# Patient Record
Sex: FEMALE | Race: WHITE | NOT HISPANIC OR LATINO | Employment: OTHER | URBAN - NONMETROPOLITAN AREA
[De-identification: names, ages, dates, MRNs, and addresses within clinical notes are randomized per-mention and may not be internally consistent; named-entity substitution may affect disease eponyms.]

---

## 2018-01-04 LAB
CREAT ?TM UR-SCNC: 222 UMOL/L
EXT MICROALBUMIN URINE RANDOM: 19.5
HBA1C MFR BLD HPLC: 5 %
MICROALBUMIN/CREAT UR: 88 MG/G{CREAT}

## 2019-01-31 ENCOUNTER — TRANSITIONAL CARE MANAGEMENT (OUTPATIENT)
Dept: FAMILY MEDICINE CLINIC | Facility: CLINIC | Age: 59
End: 2019-01-31

## 2019-02-01 RX ORDER — SENNA AND DOCUSATE SODIUM 50; 8.6 MG/1; MG/1
1 TABLET, FILM COATED ORAL
COMMUNITY
Start: 2019-01-30 | End: 2019-04-05 | Stop reason: SDUPTHER

## 2019-02-01 RX ORDER — OXYCODONE HYDROCHLORIDE 5 MG/1
1 TABLET ORAL EVERY 6 HOURS PRN
Refills: 0 | COMMUNITY
Start: 2019-01-30 | End: 2019-02-06 | Stop reason: SDUPTHER

## 2019-02-01 RX ORDER — POLYETHYLENE GLYCOL 3350 17 G/17G
17 POWDER, FOR SOLUTION ORAL DAILY
Refills: 0 | COMMUNITY
Start: 2019-01-30 | End: 2021-04-27

## 2019-02-06 ENCOUNTER — OFFICE VISIT (OUTPATIENT)
Dept: FAMILY MEDICINE CLINIC | Facility: CLINIC | Age: 59
End: 2019-02-06
Payer: COMMERCIAL

## 2019-02-06 VITALS
OXYGEN SATURATION: 95 % | TEMPERATURE: 99.1 F | BODY MASS INDEX: 17.26 KG/M2 | WEIGHT: 107.4 LBS | HEART RATE: 94 BPM | DIASTOLIC BLOOD PRESSURE: 62 MMHG | SYSTOLIC BLOOD PRESSURE: 98 MMHG | HEIGHT: 66 IN

## 2019-02-06 DIAGNOSIS — C34.91 NON-SMALL CELL CANCER OF RIGHT LUNG (HCC): ICD-10-CM

## 2019-02-06 DIAGNOSIS — H60.501 ACUTE OTITIS EXTERNA OF RIGHT EAR, UNSPECIFIED TYPE: ICD-10-CM

## 2019-02-06 DIAGNOSIS — Z00.00 ENCOUNTER FOR MEDICAL EXAMINATION TO ESTABLISH CARE: Primary | ICD-10-CM

## 2019-02-06 DIAGNOSIS — R05.9 COUGH: ICD-10-CM

## 2019-02-06 DIAGNOSIS — Z09 HOSPITAL DISCHARGE FOLLOW-UP: ICD-10-CM

## 2019-02-06 DIAGNOSIS — E78.2 MIXED HYPERLIPIDEMIA: ICD-10-CM

## 2019-02-06 DIAGNOSIS — R06.02 SHORTNESS OF BREATH: ICD-10-CM

## 2019-02-06 DIAGNOSIS — C34.91 ADENOSQUAMOUS CARCINOMA OF RIGHT LUNG (HCC): ICD-10-CM

## 2019-02-06 PROBLEM — R91.8 ENDOBRONCHIAL MASS: Status: ACTIVE | Noted: 2019-01-15

## 2019-02-06 PROBLEM — E78.5 DYSLIPIDEMIA: Status: ACTIVE | Noted: 2019-01-15

## 2019-02-06 PROBLEM — E11.9 DM (DIABETES MELLITUS) (HCC): Status: ACTIVE | Noted: 2019-01-15

## 2019-02-06 PROCEDURE — 99205 OFFICE O/P NEW HI 60 MIN: CPT | Performed by: NURSE PRACTITIONER

## 2019-02-06 RX ORDER — GUAIFENESIN AND CODEINE PHOSPHATE 100; 10 MG/5ML; MG/5ML
5 SOLUTION ORAL 3 TIMES DAILY PRN
Qty: 180 ML | Refills: 0 | Status: SHIPPED | OUTPATIENT
Start: 2019-02-06 | End: 2019-11-26 | Stop reason: SDUPTHER

## 2019-02-06 RX ORDER — ATORVASTATIN CALCIUM 40 MG/1
40 TABLET, FILM COATED ORAL DAILY
Qty: 30 TABLET | Refills: 5 | Status: SHIPPED | OUTPATIENT
Start: 2019-02-06 | End: 2019-03-06 | Stop reason: SDUPTHER

## 2019-02-06 RX ORDER — OXYCODONE HYDROCHLORIDE 5 MG/1
5 TABLET ORAL EVERY 6 HOURS PRN
Qty: 120 TABLET | Refills: 0 | Status: SHIPPED | OUTPATIENT
Start: 2019-02-06 | End: 2019-03-06 | Stop reason: SDUPTHER

## 2019-02-06 NOTE — PATIENT INSTRUCTIONS
Wellness Visit for Adults   WHAT YOU NEED TO KNOW:   What is a wellness visit? A wellness visit is when you see your healthcare provider to get screened for health problems  You can also get advice on how to stay healthy  Write down your questions so you remember to ask them  Ask your healthcare provider how often you should have a wellness visit  What happens at a wellness visit? Your healthcare provider will ask about your health, and your family history of health problems  This includes high blood pressure, heart disease, and cancer  He or she will ask if you have symptoms that concern you, if you smoke, and about your mood  You may also be asked about your intake of medicines, supplements, food, and alcohol  Any of the following may be done:  · Your weight  will be checked  Your height may also be checked so your body mass index (BMI) can be calculated  Your BMI shows if you are at a healthy weight  · Your blood pressure  and heart rate will be checked  Your temperature may also be checked  · Blood and urine tests  may be done  Blood tests may be done to check your cholesterol levels  Abnormal cholesterol levels increase your risk for heart disease and stroke  You may also need a blood or urine test to check for diabetes if you are at increased risk  Urine tests may be done to look for signs of an infection or kidney disease  · A physical exam  includes checking your heartbeat and lungs with a stethoscope  Your healthcare provider may also check your skin to look for sun damage  · Screening tests  may be recommended  A screening test is done to check for diseases that may not cause symptoms  The screening tests you may need depend on your age, gender, family history, and lifestyle habits  For example, colorectal screening may be recommended if you are 48years old or older  What screening tests do I need if I am a woman? · A Pap smear  is used to screen for cervical cancer   Pap smears are usually done every 3 to 5 years depending on your age  You may need them more often if you have had abnormal Pap smear test results in the past  Ask your healthcare provider how often you should have a Pap smear  · A mammogram  is an x-ray of your breasts to screen for breast cancer  Experts recommend mammograms every 2 years starting at age 48 years  You may need a mammogram at age 52 years or younger if you have an increased risk for breast cancer  Talk to your healthcare provider about when you should start having mammograms and how often you need them  What vaccines might I need? · Get an influenza vaccine  every year  The influenza vaccine protects you from the flu  Several types of viruses cause the flu  The viruses change over time, so new vaccines are made each year  · Get a tetanus-diphtheria (Td) booster vaccine  every 10 years  This vaccine protects you against tetanus and diphtheria  Tetanus is a severe infection that may cause painful muscle spasms and lockjaw  Diphtheria is a severe bacterial infection that causes a thick covering in the back of your mouth and throat  · Get a human papillomavirus (HPV) vaccine  if you are female and aged 23 to 32 or male 23 to 24 and never received it  This vaccine protects you from HPV infection  HPV is the most common infection spread by sexual contact  HPV may also cause vaginal, penile, and anal cancers  · Get a pneumococcal vaccine  if you are aged 72 years or older  The pneumococcal vaccine is an injection given to protect you from pneumococcal disease  Pneumococcal disease is an infection caused by pneumococcal bacteria  The infection may cause pneumonia, meningitis, or an ear infection  · Get a shingles vaccine  if you are aged 61 or older, even if you have had shingles before  The shingles vaccine is an injection to protect you from the varicella-zoster virus  This is the same virus that causes chickenpox   Shingles is a painful rash that develops in people who had chickenpox or have been exposed to the virus  How can I eat healthy? My Plate is a model for planning healthy meals  It shows the types and amounts of foods that should go on your plate  Fruits and vegetables make up about half of your plate, and grains and protein make up the other half  A serving of dairy is included on the side of your plate  The amount of calories and serving sizes you need depends on your age, gender, weight, and height  Examples of healthy foods are listed below:  · Eat a variety of vegetables  such as dark green, red, and orange vegetables  You can also include canned vegetables low in sodium (salt) and frozen vegetables without added butter or sauces  · Eat a variety of fresh fruits , canned fruit in 100% juice, frozen fruit, and dried fruit  · Include whole grains  At least half of the grains you eat should be whole grains  Examples include whole-wheat bread, wheat pasta, brown rice, and whole-grain cereals such as oatmeal     · Eat a variety of protein foods such as seafood (fish and shellfish), lean meat, and poultry without skin (turkey and chicken)  Examples of lean meats include pork leg, shoulder, or tenderloin, and beef round, sirloin, tenderloin, and extra lean ground beef  Other protein foods include eggs and egg substitutes, beans, peas, soy products, nuts, and seeds  · Choose low-fat dairy products such as skim or 1% milk or low-fat yogurt, cheese, and cottage cheese  · Limit unhealthy fats  such as butter, hard margarine, and shortening  How much exercise do I need? Exercise at least 30 minutes per day on most days of the week  Some examples of exercise include walking, biking, dancing, and swimming  You can also fit in more physical activity by taking the stairs instead of the elevator or parking farther away from stores  Include muscle strengthening activities 2 days each week  Regular exercise provides many health benefits  It helps you manage your weight, and decreases your risk for type 2 diabetes, heart disease, stroke, and high blood pressure  Exercise can also help improve your mood  Ask your healthcare provider about the best exercise plan for you  What are some general health and safety guidelines I should follow? · Do not smoke  Nicotine and other chemicals in cigarettes and cigars can cause lung damage  Ask your healthcare provider for information if you currently smoke and need help to quit  E-cigarettes or smokeless tobacco still contain nicotine  Talk to your healthcare provider before you use these products  · Limit alcohol  A drink of alcohol is 12 ounces of beer, 5 ounces of wine, or 1½ ounces of liquor  · Lose weight, if needed  Being overweight increases your risk of certain health conditions  These include heart disease, high blood pressure, type 2 diabetes, and certain types of cancer  · Protect your skin  Do not sunbathe or use tanning beds  Use sunscreen with a SPF 15 or higher  Apply sunscreen at least 15 minutes before you go outside  Reapply sunscreen every 2 hours  Wear protective clothing, hats, and sunglasses when you are outside  · Drive safely  Always wear your seatbelt  Make sure everyone in your car wears a seatbelt  A seatbelt can save your life if you are in an accident  Do not use your cell phone when you are driving  This could distract you and cause an accident  Pull over if you need to make a call or send a text message  · Practice safe sex  Use latex condoms if are sexually active and have more than one partner  Your healthcare provider may recommend screening tests for sexually transmitted infections (STIs)  · Wear helmets, lifejackets, and protective gear  Always wear a helmet when you ride a bike or motorcycle, go skiing, or play sports that could cause a head injury  Wear protective equipment when you play sports   Wear a lifejacket when you are on a boat or doing water sports  CARE AGREEMENT:   You have the right to help plan your care  Learn about your health condition and how it may be treated  Discuss treatment options with your caregivers to decide what care you want to receive  You always have the right to refuse treatment  The above information is an  only  It is not intended as medical advice for individual conditions or treatments  Talk to your doctor, nurse or pharmacist before following any medical regimen to see if it is safe and effective for you  © 2017 [address] Information is for End User's use only and may not be sold, redistributed or otherwise used for commercial purposes  All illustrations and images included in CareNotes® are the copyrighted property of [employer]  or Arturo Osorio  Lung Cancer   WHAT YOU NEED TO KNOW:   What is lung cancer? Lung cancer usually starts in the cells that line the inside of the lungs  The 2 basic types of lung cancer are non-small cell lung cancer and small cell lung cancer  What increases my risk for lung cancer? · Cigarette smoking, or breathing secondhand smoke    · Air pollution, such as diesel exhaust fumes    · Exposure to radon gas    · A family history of lung cancer    · Past lung diseases that caused scarring in the lungs, such as tuberculosis (TB)    · Working with chemicals, such as asbestos, uranium, arsenic, chromium, nickel, iron, or radioactive material  What are the signs and symptoms of lung cancer?    · Chest pain that is not just in one area of your chest    · A cough that will not go away, and gets worse over time    · Coughing up lots of sputum, which may be bloody    · Frequent colds or other respiratory infections, such as pneumonia or bronchitis    · Wheezing or trouble breathing    · Hoarseness or difficulty swallowing    · Feeling more tired and weak than usual    · Loss of appetite or weight loss without trying    · Face or neck swelling  How is lung cancer diagnosed? · Blood tests  may show an infection  · An x-ray, CT, or MRI  may show the size and location of the cancer, or signs of infection  You may be given contrast liquid to help your lungs show up better  Tell the healthcare provider if you have ever had an allergic reaction to contrast liquid  Do not enter the MRI room with anything metal  Metal can cause serious injury  Tell the healthcare provider if you have any metal in or on your body  · A bronchoscopy  is a test to look inside your airway and lungs  Healthcare providers insert a bronchoscope (tube with a light and magnifying glass on the end) into your mouth and down into your lungs  · A biopsy  is a sample of lung tissue usually collected during a bronchoscopy  The sample will be sent to a lab and checked for abnormal cells  How is lung cancer treated? · Surgery  may be done on tumors that are small and have not spread to other parts of the body  If the tumor cannot be completely removed, surgery may be used to treat complications or to decrease your symptoms  · Radiation therapy  uses x-rays or gamma rays to treat cancer  Radiation kills cancer cells and may stop the cancer from spreading  · Medicine  is used to kill cancer cells  It may also be used to shrink lymph nodes that have cancer in them  What can I do to manage my lung cancer? · Do not smoke  Nicotine and other chemicals in cigarettes and cigars can cause lung damage  Ask your healthcare provider for information if you currently smoke and need help to quit  E-cigarettes or smokeless tobacco still contain nicotine  Talk to your healthcare provider before you use these products  · Drink liquids as directed  Ask how much liquid to drink each day and which liquids are best for you  Drink extra liquids to prevent dehydration  You will need to drink extra liquids if you are vomiting or have diarrhea from cancer treatments      · Return to activities slowly  Do more as you feel stronger  You may have trouble breathing when you are lying down  Use foam wedges or elevate the head of your bed  This may help you breathe easier while you are resting or sleeping  Use a device that will tilt your whole body, or bend your body at the waist  The device should not bend your body at the upper back or neck  · Exercise as directed  Exercise can help increase your energy level  · Eat healthy foods  Healthy foods include fruits, vegetables, whole-grain breads, low-fat dairy products, beans, lean meats, and fish  It may be easier for you to eat several small meals a day rather than a few large meals  · Limit or do not drink alcohol as directed  Alcohol can make breathing problems worse  Ask your healthcare provider if alcohol is safe for you to drink  You will need to limit the amount you drink if it is safe for you  Men should limit alcohol to 2 drinks per day  Women should limit alcohol to 1 drink per day  A drink of alcohol is 12 ounces of beer, 5 ounces of wine, or 1½ ounces of liquor  Call 911 for any of the following:   · Your arm or leg feels warm, tender, and painful  It may look swollen and red  · You have chest pain when you take a deep breath or cough  · You suddenly feel lightheaded or are short of breath  · You cough up blood  When should I seek immediate care? · You cannot think clearly  · Your lips or nails look blue or pale  When should I contact my healthcare provider? · You have a fever  · You have blood in your mucus or spit  · You are vomiting and cannot keep food or liquids down  · You have questions or concerns about your condition or care  CARE AGREEMENT:   You have the right to help plan your care  Learn about your health condition and how it may be treated  Discuss treatment options with your caregivers to decide what care you want to receive  You always have the right to refuse treatment   The above information is an  only  It is not intended as medical advice for individual conditions or treatments  Talk to your doctor, nurse or pharmacist before following any medical regimen to see if it is safe and effective for you  © 2017 [address] Information is for End User's use only and may not be sold, redistributed or otherwise used for commercial purposes  All illustrations and images included in CareNotes® are the copyrighted property of [employer]  or Art[employer] Osorio

## 2019-02-06 NOTE — PROGRESS NOTES
Assessment/Plan:     Diagnoses and all orders for this visit:    Encounter for medical examination to establish care    Hospital discharge follow-up    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HCC)  -     guaifenesin-codeine (GUAIFENESIN AC) 100-10 MG/5ML liquid; Take 5 mL by mouth 3 (three) times a day as needed for cough  -     oxyCODONE (ROXICODONE) 5 mg immediate release tablet; Take 1 tablet (5 mg total) by mouth every 6 (six) hours as needed for moderate pain or severe pain Max Daily Amount: 20 mg  -     Shower chair  -     Walker  -     Misc  Devices (COMMODE BEDSIDE) MISC; by Does not apply route daily as needed (shortness of breath)  -     Ambulatory referral to Palliative Care; Future    Non-small cell cancer of right lung (HCC)  -     guaifenesin-codeine (GUAIFENESIN AC) 100-10 MG/5ML liquid; Take 5 mL by mouth 3 (three) times a day as needed for cough  -     oxyCODONE (ROXICODONE) 5 mg immediate release tablet; Take 1 tablet (5 mg total) by mouth every 6 (six) hours as needed for moderate pain or severe pain Max Daily Amount: 20 mg  -     Shower chair  -     Walker  -     Misc  Devices (COMMODE BEDSIDE) MISC; by Does not apply route daily as needed (shortness of breath)  -     Ambulatory referral to Palliative Care; Future    Shortness of breath  -     Shower chair  -     Walker  -     Misc  Devices (COMMODE BEDSIDE) MISC; by Does not apply route daily as needed (shortness of breath)  -     Ambulatory referral to Palliative Care; Future    Cough  -     guaifenesin-codeine (GUAIFENESIN AC) 100-10 MG/5ML liquid; Take 5 mL by mouth 3 (three) times a day as needed for cough  -     Ambulatory referral to Palliative Care; Future    Acute otitis externa of right ear, unspecified type  -     neomycin-polymyxin-hydrocortisone (CORTISPORIN) otic solution; Administer 4 drops to the right ear every 6 (six) hours for 7 days    Mixed hyperlipidemia  -     atorvastatin (LIPITOR) 40 mg tablet;  Take 1 tablet (40 mg total) by mouth daily    Other orders  -     senna-docusate sodium (SENOKOT-S) 8 6-50 mg per tablet; Take 1 tablet by mouth daily at bedtime   -     polyethylene glycol (MIRALAX) 17 g packet; Take 17 g by mouth daily   -     Discontinue: oxyCODONE (ROXICODONE) 5 mg immediate release tablet; Take 1 tablet by mouth every 6 (six) hours as needed         Subjective:      Patient ID: Joao Valdes is a 66 y o  female  Patient presents to [address] [place] [address] to establish care and follow up after hospitalization  Allergies, medical history and current medications reviewed with patient; patient reports taking all medications as prescribed without issues  Patient had been admitted to [facility] in [facility] with a discharge diagnosis of lung cancer  Patient follows with Oncology Dr Mtahew Bishop (next appointment 2/14/19) and Radiation Oncology Dr Michael Resendez (next appointment 3/19/19)  Patient is scheduled for a PET scan on 1/12/19  Patient is also receiving care through home health  Today, patient C/O a cough with associated chest pain  Patient reports she has a decreased appetite, but her  reports it has "been better than it was " Patient currently tries to eat 6 small meals daily due to Chronic Pancreatitis  Patient reports she also follows with [facility] for chronic pancreatitis; patient reports she was told that she had 5 years to live due to precancerous lesions on her pancreas  Patient reports she is currently at the 6 year dany  TCM Call (since 1/6/2019)     Date and time call was made  1/31/2019  1:29 PM    Hospital care reviewed  Records reviewed    Patient was hospitialized at  [facility]    Date of Admission  01/15/19    Date of discharge  01/30/19    Diagnosis  hemoptysis  right lung mass   pulmonary emphysema    Disposition  Home    Were the patients medications reviewed and updated  No    Current Symptoms  Chest pain    Chest pain severity  Moderate      TCM Call (since 1/6/2019)     Scheduled for follow up? Yes    I have advised the patient to call PCP with any new or worsening symptoms    [facility]       Patient Care Team:  Meaghan Cowan as PCP - General (Family Medicine)  Necia Phoenix, MD (Oncology)  Mj Suarez MD (Radiation Oncology)    Review of Systems   Constitutional: Positive for appetite change (decreased) and fatigue  Negative for activity change, chills, fever and unexpected weight change  HENT: Positive for trouble swallowing  Negative for congestion, ear pain, hearing loss, postnasal drip, rhinorrhea, sinus pressure, sore throat and voice change  Eyes: Negative for pain, itching and visual disturbance  Respiratory: Positive for cough and shortness of breath  Negative for chest tightness  Cardiovascular: Positive for chest pain (non-cardiac)  Negative for palpitations and leg swelling  Gastrointestinal: Negative for abdominal pain, blood in stool, constipation, diarrhea, nausea and vomiting  Endocrine: Negative for cold intolerance and heat intolerance  Genitourinary: Negative for difficulty urinating, dysuria, frequency, hematuria and urgency  Musculoskeletal: Negative for arthralgias, back pain, joint swelling and myalgias  Skin: Negative for color change, rash and wound  Allergic/Immunologic: Negative  Neurological: Positive for dizziness, weakness ("unstable on feet") and headaches  Negative for light-headedness and numbness  Hematological: Negative  Psychiatric/Behavioral: Negative  Objective:    BP 98/62 (BP Location: Right arm, Patient Position: Sitting, Cuff Size: Standard)   Pulse 94   Temp 99 1 °F (37 3 °C) (Temporal)   Ht 5' 6" (1 676 m)   Wt 48 7 kg (107 lb 6 4 oz)   SpO2 95% Comment: on continuous O2  BMI 17 33 kg/m²      Physical Exam   Constitutional: She is oriented to person, place, and time  She appears well-developed and well-nourished  No distress  Nasal cannula in place     HENT:   Head: Normocephalic and atraumatic  Right Ear: Ear canal normal  There is swelling (with erythema)  Tympanic membrane is injected  Left Ear: Tympanic membrane, external ear and ear canal normal    Nose: Nose normal  No mucosal edema  Mouth/Throat: Uvula is midline, oropharynx is clear and moist and mucous membranes are normal  No oropharyngeal exudate, posterior oropharyngeal edema or posterior oropharyngeal erythema  Nasal turbinates pink, moist and without exudate  Eyes: Conjunctivae and lids are normal  Right eye exhibits no discharge  Left eye exhibits no discharge  Right conjunctiva is not injected  Left conjunctiva is not injected  Neck: Normal range of motion  No tracheal deviation present  Cardiovascular: Normal rate, regular rhythm, S1 normal and S2 normal   Exam reveals distant heart sounds  No murmur heard  Pulmonary/Chest: Effort normal  No respiratory distress  She has decreased breath sounds  Abdominal: Bowel sounds are normal  She exhibits no distension  There is no guarding  Musculoskeletal: Normal range of motion  She exhibits no edema, tenderness or deformity  Neurological: She is alert and oriented to person, place, and time  Skin: Skin is warm, dry and intact  Psychiatric: Her speech is normal  She exhibits a depressed mood  Nursing note and vitals reviewed

## 2019-02-08 ENCOUNTER — TELEPHONE (OUTPATIENT)
Dept: FAMILY MEDICINE CLINIC | Facility: CLINIC | Age: 59
End: 2019-02-08

## 2019-02-08 DIAGNOSIS — J43.9 PULMONARY EMPHYSEMA, UNSPECIFIED EMPHYSEMA TYPE (HCC): Primary | ICD-10-CM

## 2019-02-08 DIAGNOSIS — C34.91 NON-SMALL CELL CANCER OF RIGHT LUNG (HCC): ICD-10-CM

## 2019-02-08 DIAGNOSIS — M79.7 FIBROMYALGIA: ICD-10-CM

## 2019-02-08 DIAGNOSIS — M51.37 DEGENERATION OF LUMBOSACRAL INTERVERTEBRAL DISC: ICD-10-CM

## 2019-02-08 DIAGNOSIS — C34.91 ADENOSQUAMOUS CARCINOMA OF RIGHT LUNG (HCC): ICD-10-CM

## 2019-02-19 ENCOUNTER — TRANSITIONAL CARE MANAGEMENT (OUTPATIENT)
Dept: FAMILY MEDICINE CLINIC | Facility: CLINIC | Age: 59
End: 2019-02-19

## 2019-02-19 RX ORDER — SENNA AND DOCUSATE SODIUM 50; 8.6 MG/1; MG/1
1 TABLET, FILM COATED ORAL
COMMUNITY
Start: 2019-01-30 | End: 2019-04-04 | Stop reason: SDUPTHER

## 2019-02-19 RX ORDER — OXYCODONE HYDROCHLORIDE 5 MG/1
5 TABLET ORAL EVERY 6 HOURS PRN
COMMUNITY
Start: 2019-01-30 | End: 2019-03-06 | Stop reason: SDUPTHER

## 2019-02-22 ENCOUNTER — TELEPHONE (OUTPATIENT)
Dept: FAMILY MEDICINE CLINIC | Facility: CLINIC | Age: 59
End: 2019-02-22

## 2019-02-22 ENCOUNTER — OFFICE VISIT (OUTPATIENT)
Dept: FAMILY MEDICINE CLINIC | Facility: CLINIC | Age: 59
End: 2019-02-22
Payer: COMMERCIAL

## 2019-02-22 VITALS
WEIGHT: 106 LBS | HEART RATE: 67 BPM | DIASTOLIC BLOOD PRESSURE: 64 MMHG | OXYGEN SATURATION: 96 % | TEMPERATURE: 99.5 F | SYSTOLIC BLOOD PRESSURE: 108 MMHG | HEIGHT: 66 IN | BODY MASS INDEX: 17.04 KG/M2

## 2019-02-22 DIAGNOSIS — J43.9 PULMONARY EMPHYSEMA, UNSPECIFIED EMPHYSEMA TYPE (HCC): ICD-10-CM

## 2019-02-22 DIAGNOSIS — C34.91 ADENOSQUAMOUS CARCINOMA OF RIGHT LUNG (HCC): ICD-10-CM

## 2019-02-22 DIAGNOSIS — Z09 HOSPITAL DISCHARGE FOLLOW-UP: Primary | ICD-10-CM

## 2019-02-22 PROCEDURE — 99495 TRANSJ CARE MGMT MOD F2F 14D: CPT | Performed by: NURSE PRACTITIONER

## 2019-02-22 RX ORDER — IBUPROFEN/DIPHENHYDRAMINE CIT 200MG-38MG
TABLET ORAL 3 TIMES DAILY PRN
Qty: 150 EACH | Refills: 5 | Status: SHIPPED | OUTPATIENT
Start: 2019-02-22 | End: 2021-04-27

## 2019-02-22 NOTE — TELEPHONE ENCOUNTER
----- Message from [facility] sent at 2/22/2019  2:14 PM EST -----  Patient states she has not heard from [facility] about an appointment  Can we look into this?

## 2019-02-22 NOTE — PATIENT INSTRUCTIONS
Wellness Visit for Adults   WHAT YOU NEED TO KNOW:   What is a wellness visit? A wellness visit is when you see your healthcare provider to get screened for health problems  You can also get advice on how to stay healthy  Write down your questions so you remember to ask them  Ask your healthcare provider how often you should have a wellness visit  What happens at a wellness visit? Your healthcare provider will ask about your health, and your family history of health problems  This includes high blood pressure, heart disease, and cancer  He or she will ask if you have symptoms that concern you, if you smoke, and about your mood  You may also be asked about your intake of medicines, supplements, food, and alcohol  Any of the following may be done:  · Your weight  will be checked  Your height may also be checked so your body mass index (BMI) can be calculated  Your BMI shows if you are at a healthy weight  · Your blood pressure  and heart rate will be checked  Your temperature may also be checked  · Blood and urine tests  may be done  Blood tests may be done to check your cholesterol levels  Abnormal cholesterol levels increase your risk for heart disease and stroke  You may also need a blood or urine test to check for diabetes if you are at increased risk  Urine tests may be done to look for signs of an infection or kidney disease  · A physical exam  includes checking your heartbeat and lungs with a stethoscope  Your healthcare provider may also check your skin to look for sun damage  · Screening tests  may be recommended  A screening test is done to check for diseases that may not cause symptoms  The screening tests you may need depend on your age, gender, family history, and lifestyle habits  For example, colorectal screening may be recommended if you are 48years old or older  What screening tests do I need if I am a woman? · A Pap smear  is used to screen for cervical cancer   Pap smears are usually done every 3 to 5 years depending on your age  You may need them more often if you have had abnormal Pap smear test results in the past  Ask your healthcare provider how often you should have a Pap smear  · A mammogram  is an x-ray of your breasts to screen for breast cancer  Experts recommend mammograms every 2 years starting at age 48 years  You may need a mammogram at age 52 years or younger if you have an increased risk for breast cancer  Talk to your healthcare provider about when you should start having mammograms and how often you need them  What vaccines might I need? · Get an influenza vaccine  every year  The influenza vaccine protects you from the flu  Several types of viruses cause the flu  The viruses change over time, so new vaccines are made each year  · Get a tetanus-diphtheria (Td) booster vaccine  every 10 years  This vaccine protects you against tetanus and diphtheria  Tetanus is a severe infection that may cause painful muscle spasms and lockjaw  Diphtheria is a severe bacterial infection that causes a thick covering in the back of your mouth and throat  · Get a human papillomavirus (HPV) vaccine  if you are female and aged 23 to 32 or male 23 to 24 and never received it  This vaccine protects you from HPV infection  HPV is the most common infection spread by sexual contact  HPV may also cause vaginal, penile, and anal cancers  · Get a pneumococcal vaccine  if you are aged 72 years or older  The pneumococcal vaccine is an injection given to protect you from pneumococcal disease  Pneumococcal disease is an infection caused by pneumococcal bacteria  The infection may cause pneumonia, meningitis, or an ear infection  · Get a shingles vaccine  if you are aged 61 or older, even if you have had shingles before  The shingles vaccine is an injection to protect you from the varicella-zoster virus  This is the same virus that causes chickenpox   Shingles is a painful rash that develops in people who had chickenpox or have been exposed to the virus  How can I eat healthy? My Plate is a model for planning healthy meals  It shows the types and amounts of foods that should go on your plate  Fruits and vegetables make up about half of your plate, and grains and protein make up the other half  A serving of dairy is included on the side of your plate  The amount of calories and serving sizes you need depends on your age, gender, weight, and height  Examples of healthy foods are listed below:  · Eat a variety of vegetables  such as dark green, red, and orange vegetables  You can also include canned vegetables low in sodium (salt) and frozen vegetables without added butter or sauces  · Eat a variety of fresh fruits , canned fruit in 100% juice, frozen fruit, and dried fruit  · Include whole grains  At least half of the grains you eat should be whole grains  Examples include whole-wheat bread, wheat pasta, brown rice, and whole-grain cereals such as oatmeal     · Eat a variety of protein foods such as seafood (fish and shellfish), lean meat, and poultry without skin (turkey and chicken)  Examples of lean meats include pork leg, shoulder, or tenderloin, and beef round, sirloin, tenderloin, and extra lean ground beef  Other protein foods include eggs and egg substitutes, beans, peas, soy products, nuts, and seeds  · Choose low-fat dairy products such as skim or 1% milk or low-fat yogurt, cheese, and cottage cheese  · Limit unhealthy fats  such as butter, hard margarine, and shortening  How much exercise do I need? Exercise at least 30 minutes per day on most days of the week  Some examples of exercise include walking, biking, dancing, and swimming  You can also fit in more physical activity by taking the stairs instead of the elevator or parking farther away from stores  Include muscle strengthening activities 2 days each week  Regular exercise provides many health benefits  It helps you manage your weight, and decreases your risk for type 2 diabetes, heart disease, stroke, and high blood pressure  Exercise can also help improve your mood  Ask your healthcare provider about the best exercise plan for you  What are some general health and safety guidelines I should follow? · Do not smoke  Nicotine and other chemicals in cigarettes and cigars can cause lung damage  Ask your healthcare provider for information if you currently smoke and need help to quit  E-cigarettes or smokeless tobacco still contain nicotine  Talk to your healthcare provider before you use these products  · Limit alcohol  A drink of alcohol is 12 ounces of beer, 5 ounces of wine, or 1½ ounces of liquor  · Lose weight, if needed  Being overweight increases your risk of certain health conditions  These include heart disease, high blood pressure, type 2 diabetes, and certain types of cancer  · Protect your skin  Do not sunbathe or use tanning beds  Use sunscreen with a SPF 15 or higher  Apply sunscreen at least 15 minutes before you go outside  Reapply sunscreen every 2 hours  Wear protective clothing, hats, and sunglasses when you are outside  · Drive safely  Always wear your seatbelt  Make sure everyone in your car wears a seatbelt  A seatbelt can save your life if you are in an accident  Do not use your cell phone when you are driving  This could distract you and cause an accident  Pull over if you need to make a call or send a text message  · Practice safe sex  Use latex condoms if are sexually active and have more than one partner  Your healthcare provider may recommend screening tests for sexually transmitted infections (STIs)  · Wear helmets, lifejackets, and protective gear  Always wear a helmet when you ride a bike or motorcycle, go skiing, or play sports that could cause a head injury  Wear protective equipment when you play sports   Wear a lifejacket when you are on a boat or doing water sports  CARE AGREEMENT:   You have the right to help plan your care  Learn about your health condition and how it may be treated  Discuss treatment options with your caregivers to decide what care you want to receive  You always have the right to refuse treatment  The above information is an  only  It is not intended as medical advice for individual conditions or treatments  Talk to your doctor, nurse or pharmacist before following any medical regimen to see if it is safe and effective for you  © 2017 [address] [employer] Information is for End User's use only and may not be sold, redistributed or otherwise used for commercial purposes  All illustrations and images included in CareNotes® are the copyrighted property of A D A M [employer] Inc  or Arturo Osorio

## 2019-02-22 NOTE — PROGRESS NOTES
Assessment/Plan:     Diagnoses and all orders for this visit:    Hospital discharge follow-up    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[facility] )  -     [address] ([address]) [address] [facility]; by Does not apply route 3 (three) times a day as needed (Infection prevention)    Pulmonary emphysema, unspecified emphysema type ([facility] )  -     Walker  -     Masks ([address]) MIS; by Does not apply route 3 (three) times a day as needed (Infection prevention)    Other orders  -     senna-docusate sodium (SENOKOT-S) 8 6-50 mg per tablet; Take 1 tablet by mouth  -     oxyCODONE (ROXICODONE) 5 mg immediate release tablet; Take 5 mg by mouth every 6 (six) hours as needed        Subjective:      Patient ID: Moisés Dee is a 66 y o  female  Patient presents to [address] [place] [address] as follow up after hospitalization  Allergies, medical history and current medications reviewed with patient; patient reports taking all medications as prescribed without issues  Patient was admitted to [place] [address] where she was treated for acute pneumothorax secondary to carcinoma of the lung  Patient has follow up appointments with Oncology Dr Patrica Osei on 2/26/19, and Radiation Oncology Dr Leah Rubin on 3/19/19  Patient has VNA and Home PT  Patient was previously referred to Palliative Care for treatment guidance, but reports she has not heard from them  Patient states she continues to be short of breath with exertion, but that she is improving  Patient denies any other problems or concerns at this time  Patient is also requesting script for walker with a seat, and face masks       TCM Call (since 1/22/2019)     Date and time call was made  2/19/2019 11:09 AM    Hospital care reviewed  Records reviewed    Patient was hospitialized at  [facility]    Date of Admission  02/15/19    Date of discharge  02/18/19    Diagnosis  pneumothorax, lung ca    Disposition  Home    Were the patients medications reviewed and updated  Yes    Current Symptoms  None    Chest pain severity  Moderate      TCM Call (since 1/22/2019)     Post hospital issues  None    Should patient be enrolled in anticoag monitoring? No    Scheduled for follow up? Yes    Did you obtain your prescribed medications  Yes    Do you need help managing your prescriptions or medications  No    Is transportation to your appointment needed  No    I have advised the patient to call PCP with any new or worsening symptoms    nsands    Living Arrangements  Spouse or Significiant other        Patient Care Team:  Brock Layton as PCP - General (Family Medicine)  Debbie Draper MD (Oncology)  Rebecca Ivan MD (Radiation Oncology)    Review of Systems   Respiratory: Positive for shortness of breath  Cardiovascular: Positive for chest pain (chest tube insertion site)  Neurological: Positive for weakness  All other systems reviewed and are negative  Objective:    /64 (BP Location: Right arm, Patient Position: Sitting, Cuff Size: Standard)   Pulse 67   Temp 99 5 °F (37 5 °C) (Temporal)   Ht 5' 6" (1 676 m)   Wt 48 1 kg (106 lb)   SpO2 96%   BMI 17 11 kg/m²      Physical Exam   Constitutional: She is oriented to person, place, and time  She appears well-developed and well-nourished  No distress  Nasal cannula and face mask in place  HENT:   Head: Normocephalic and atraumatic  Right Ear: Tympanic membrane, external ear and ear canal normal    Left Ear: Tympanic membrane, external ear and ear canal normal    Eyes: Conjunctivae and lids are normal  Right eye exhibits no discharge  Left eye exhibits no discharge  Right conjunctiva is not injected  Left conjunctiva is not injected  Neck: Normal range of motion  No tracheal deviation present  Cardiovascular: Normal rate, regular rhythm, S1 normal, S2 normal and normal heart sounds  No murmur heard  Pulmonary/Chest: Effort normal  No respiratory distress   She has decreased breath sounds in the right upper field and the left upper field  Abdominal: Bowel sounds are normal  She exhibits no distension  There is no guarding  Musculoskeletal: Normal range of motion  She exhibits no edema, tenderness or deformity  Neurological: She is alert and oriented to person, place, and time  Skin: Skin is warm, dry and intact  Psychiatric: She has a normal mood and affect  Her speech is normal    Nursing note and vitals reviewed

## 2019-03-06 ENCOUNTER — OFFICE VISIT (OUTPATIENT)
Dept: FAMILY MEDICINE CLINIC | Facility: CLINIC | Age: 59
End: 2019-03-06
Payer: COMMERCIAL

## 2019-03-06 VITALS
WEIGHT: 107.6 LBS | TEMPERATURE: 99.6 F | HEIGHT: 66 IN | SYSTOLIC BLOOD PRESSURE: 148 MMHG | HEART RATE: 102 BPM | OXYGEN SATURATION: 98 % | BODY MASS INDEX: 17.29 KG/M2 | DIASTOLIC BLOOD PRESSURE: 84 MMHG

## 2019-03-06 DIAGNOSIS — E78.2 MIXED HYPERLIPIDEMIA: ICD-10-CM

## 2019-03-06 DIAGNOSIS — F41.9 ANXIETY: ICD-10-CM

## 2019-03-06 DIAGNOSIS — Z00.00 ENCOUNTER FOR MEDICARE ANNUAL WELLNESS EXAM: Primary | ICD-10-CM

## 2019-03-06 DIAGNOSIS — C34.91 NON-SMALL CELL CANCER OF RIGHT LUNG (HCC): ICD-10-CM

## 2019-03-06 DIAGNOSIS — C34.91 ADENOSQUAMOUS CARCINOMA OF RIGHT LUNG (HCC): ICD-10-CM

## 2019-03-06 PROBLEM — E46 PROTEIN CALORIE MALNUTRITION (HCC): Status: ACTIVE | Noted: 2019-02-15

## 2019-03-06 PROCEDURE — G0439 PPPS, SUBSEQ VISIT: HCPCS | Performed by: NURSE PRACTITIONER

## 2019-03-06 RX ORDER — ALPRAZOLAM 2 MG/1
2 TABLET ORAL 3 TIMES DAILY PRN
Qty: 90 TABLET | Refills: 0 | Status: SHIPPED | OUTPATIENT
Start: 2019-03-06 | End: 2019-04-04 | Stop reason: SDUPTHER

## 2019-03-06 RX ORDER — OXYCODONE HYDROCHLORIDE 5 MG/1
5 TABLET ORAL EVERY 4 HOURS PRN
Qty: 180 TABLET | Refills: 0 | Status: SHIPPED | OUTPATIENT
Start: 2019-03-06 | End: 2019-04-04 | Stop reason: SDUPTHER

## 2019-03-06 RX ORDER — ATORVASTATIN CALCIUM 40 MG/1
40 TABLET, FILM COATED ORAL DAILY
Qty: 30 TABLET | Refills: 5 | Status: SHIPPED | OUTPATIENT
Start: 2019-03-06 | End: 2019-05-03 | Stop reason: SDUPTHER

## 2019-03-06 NOTE — PROGRESS NOTES
Assessment and Plan:  Problem List Items Addressed This Visit     None        Health Maintenance Due   Topic Date Due    Depression Screening PHQ  1960    Medicare Annual Wellness Visit (AWV)  1960    MAMMOGRAM  1962    CRC Screening: Colonoscopy  1962    DM Eye Exam  1972    BMI: Followup Plan  1980    HEPATITIS B VACCINES (1 of 3 - Risk 3-dose series) 1981    Pneumococcal PPSV23 Highest Risk Adult (1 of 3 - PCV13) 1981    PAP SMEAR  1983    DTaP,Tdap,and Td Vaccines (2 - Td) 2017    URINE MICROALBUMIN  2019         HPI:  Patient Active Problem List   Diagnosis    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[address] )    Anemia    Asthma    Chronic pancreatitis ([address] )    COPD with emphysema ([address] )    Degeneration of lumbosacral intervertebral disc    DM [facility]diabetes mellitus) [facility]    Dyslipidemia    Endobronchial mass    Fibromyalgia    Gastroesophageal reflux disease    Non-small cell cancer of right lung (HCC)    Restless legs syndrome     Past Medical History:   Diagnosis Date    Cancer ([address] )     Diabetes mellitus ([address] )     borderline    Irregular heartbeat     Pancreatitis     Pre cancerous lesions per [facility]     Past Surgical History:   Procedure Laterality Date    BRONCHOSCOPY      CHOLECYSTECTOMY       Family History   Problem Relation Age of Onset    Cancer Mother     COPD Mother     Diabetes Maternal Grandfather      Social History     Tobacco Use   Smoking Status Former Smoker    Packs/day: 2 00    Years: 40 00    Pack years: 80 00    Types: Cigarettes    Last attempt to quit: 2019    Years since quittin 1   Smokeless Tobacco Never Used     Social History     Substance and Sexual Activity   Alcohol Use No      Social History     Substance and Sexual Activity   Drug Use No         Current Outpatient Medications   Medication Sig Dispense Refill    ALPRAZolam (XANAX) 2 MG tablet Take 1 tablet by mouth 3 (three) times a day as needed   2    atorvastatin (LIPITOR) 40 mg tablet Take 1 tablet (40 mg total) by mouth daily 30 tablet 5    guaifenesin-codeine (GUAIFENESIN AC) 100-10 MG/5ML liquid Take 5 mL by mouth 3 (three) times a day as needed for cough 180 mL 0    oxyCODONE (ROXICODONE) 5 mg immediate release tablet Take 1 tablet (5 mg total) by mouth every 6 (six) hours as needed for moderate pain or severe pain Max Daily Amount: 20 mg (Patient taking differently: Take 5 mg by mouth every 4 (four) hours as needed for moderate pain or severe pain ) 120 tablet 0    polyethylene glycol (MIRALAX) 17 g packet Take 17 g by mouth daily   0    senna-docusate sodium (SENOKOT-S) 8 6-50 mg per tablet Take 1 tablet by mouth daily at bedtime       sertraline (ZOLOFT) 100 mg tablet Take 1 tablet by mouth daily at bedtime   0    temazepam (RESTORIL) 30 mg capsule Take 30 mg by mouth daily at bedtime  0    Masks (FACE MASK EARLOOP-STYLE) MISC by Does not apply route 3 (three) times a day as needed (Infection prevention) 150 each 5    Misc  Devices (COMMODE BEDSIDE) MISC by Does not apply route daily as needed (shortness of breath) 1 each 0    neomycin-polymyxin-hydrocortisone (CORTISPORIN) otic solution Administer 4 drops to the right ear every 6 (six) hours for 7 days 10 mL 0    oxyCODONE (ROXICODONE) 5 mg immediate release tablet Take 5 mg by mouth every 6 (six) hours as needed      senna-docusate sodium (SENOKOT-S) 8 6-50 mg per tablet Take 1 tablet by mouth       No current facility-administered medications for this visit        Allergies   Allergen Reactions    Sulfa Antibiotics Rash and Palpitations    [place] Flavor Hives    Codeine     Nsaids      daypro, orudis    can take motrin    Other      Steroid shots:  Heart race, shakes    Naproxen Rash    Prednisone Palpitations     Immunization History   Administered Date(s) Administered    Tdap 08/02/2007       Patient Care Team:  Glendy Chatterjee as PCP - General (Family Medicine)  Jeny Steiner MD (Oncology)  Ana Dumont MD (Radiation Oncology)    Medicare Screening Tests and Risk Assessments:      Health Risk Assessment:  Patient rates overall health as poor  Patient feels that their physical health rating is Slightly worse  Eyesight was rated as Slightly worse  Hearing was rated as Slightly worse  Patient feels that their emotional and mental health rating is Slightly worse  Pain experienced by patient in the last 7 days has been A lot  Patient's pain rating has been 9/10  Patient states that she has experienced weight loss or gain in last 6 months  Emotional/Mental Health:  Patient has been feeling nervous/anxious  PHQ-9 Depression Screening:    Frequency of the following problems over the past two weeks:      1  Little interest or pleasure in doing things: 2 - more than half the days      2  Feeling down, depressed, or hopeless: 2 - more than half the days      3  Trouble falling or staying asleep, or sleeping too much: 1 - several days      4  Feeling tired or having little energy: 3 - nearly every day      5  Poor appetite or overeating: 3 - nearly every day      6  Feeling bad about yourself - or that you are a failure or have let yourself or your family down: 0 - not at all      7  Trouble concentrating on things, such as reading the newspaper or watching television: 0 - not at all      8  Moving or speaking so slowly that other people could have noticed  Or the opposite - being so fidgety or restless that you have been moving around a lot more than usual: 0 - not at all      9  Thoughts that you would be better off dead, or of hurting yourself in some way: 0 - not at all  PHQ-2 Score: 4  PHQ-9 Score: 11    Broken Bones/Falls: Fall Risk Assessment:    In the past year, patient has experienced: No history of falling in past year          Bladder/Bowel:  Patient has not leaked urine accidently in the last six months    Patient reports no loss of bowel control  Immunizations:  Patient has not had a flu vaccination within the last year  Patient has not received a pneumonia shot  Patient has not received a shingles shot  Patient has not received tetanus/diphtheria shot  Home Safety:  Patient has trouble with stairs inside or outside of their home  Patient currently reports that there are no safety hazards present in home, working smoke alarms, working carbon monoxide detectors  Preventative Screenings:   No breast cancer screening performed, no colon cancer screen completed, cholesterol screen completed, 2/15/2019  no glaucoma eye exam completed    Nutrition:  Current diet: Other (please comment) with servings of the following:  (Additional Comments: High protein)    Medications:  Patient is currently taking over-the-counter supplements  Patient is able to manage medications  Lifestyle Choices:  Patient reports no tobacco use  Patient has smoked or used tobacco in the past   Patient has stopped her tobacco use  Patient reports no alcohol use  Patient does not drive a vehicle  Patient wears seat belt  Current level of exercise of physical activity described by patient as: low  Activities of Daily Living:  Can get out of bed by his or her self, able to dress self, unable to make own meals, unable to do own shopping, unable to bathe self, unable to do laundry/housekeeping, unable to manage own money and other related tasks    Previous Hospitalizations:  Hospitalization or ED visit in past 12 months  Number of hospitalizations within the last year: 1-2  Additional Comments: Recent diagnosis of lung cancerSocial Support: [facility], , family      No exam data present    Physical Exam:  Review of Systems   Gastrointestinal: Negative for bowel incontinence  Psychiatric/Behavioral: The patient is nervous/anxious          Vitals:    03/06/19 0849   BP: 148/84   BP Location: Left arm   Patient Position: Sitting Cuff Size: Standard   Pulse: 102   Temp: 99 6 °F (37 6 °C)   TempSrc: Temporal   SpO2: 98%   Weight: 48 8 kg (107 lb 9 6 oz)   Height: 5' 6" (1 676 m)   Body mass index is 17 37 kg/m²      Physical Exam

## 2019-03-06 NOTE — PROGRESS NOTES
Assessment/Plan:     Diagnoses and all orders for this visit:    Encounter for Medicare annual wellness exam    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[address] )  -     oxyCODONE (ROXICODONE) 5 mg immediate release tablet; Take 1 tablet (5 mg total) by mouth every 4 (four) hours as needed for moderate pain or severe painMax Daily Amount: 30 mg    Non-small cell cancer of right lung (HCC)  -     oxyCODONE (ROXICODONE) 5 mg immediate release tablet; Take 1 tablet (5 mg total) by mouth every 4 (four) hours as needed for moderate pain or severe painMax Daily Amount: 30 mg    Mixed hyperlipidemia  -     atorvastatin (LIPITOR) 40 mg tablet; Take 1 tablet (40 mg total) by mouth daily    Anxiety  -     ALPRAZolam (XANAX) 2 MG tablet; Take 1 tablet (2 mg total) by mouth 3 (three) times a day as needed for anxiety or sleep        Subjective:      Patient ID: Ayden Grijalva is a 66 y o  female  Patient presents to [address] [place] [address] for medicare wellness visit  Allergies, medical history and current medications reviewed with patient; patient reports taking all medications as prescribed without issues  Patient states visiting nurse had told the patient her left upper lobe had diminished lung sounds and that her BP was 180/90  Patient does report symptoms of headaches once weekly  Patient states she is in constant pain and has been stressed due to the complexity of her care  Patient does not currently take any medications for BP and is not currently interested in any medications  Instructed patient to notify office if BP is continuously elevated above 150/90  Patient Care Team:  Judith Wolff as PCP - General (Family Medicine)  Kaela Johnson MD (Oncology)  Khari Coulter MD (Radiation Oncology)    Review of Systems   Respiratory: Positive for shortness of breath (secondary to carcinoma)  Cardiovascular: Positive for chest pain (secondary to carcinoma)     All other systems reviewed and are negative  Objective:  Assessment and Plan:    Problem List Items Addressed This Visit        Respiratory    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[facility] [address] )    Relevant Medications    oxyCODONE (ROXICODONE) 5 mg immediate release tablet    Non-small cell cancer of right lung (HCC)    Relevant Medications    oxyCODONE (ROXICODONE) 5 mg immediate release tablet       Other    Anxiety    Relevant Medications    ALPRAZolam (XANAX) 2 MG tablet      Other Visit Diagnoses     Encounter for Medicare annual wellness exam    -  Primary    Mixed hyperlipidemia        Relevant Medications    atorvastatin (LIPITOR) 40 mg tablet        Health Maintenance Due   Topic Date Due    Medicare Annual Wellness Visit (AWV)  1960    MAMMOGRAM  1960    CRC Screening: Colonoscopy  1960    DM Eye Exam  04/27/1972    BMI: Followup Plan  04/27/1980    HEPATITIS B VACCINES (1 of 3 - Risk 3-dose series) 04/27/1981    Pneumococcal PPSV23 Highest Risk Adult (1 of 3 - PCV13) 04/27/1981    PAP SMEAR  04/27/1983    DTaP,Tdap,and Td Vaccines (2 - Td) 08/02/2017    URINE MICROALBUMIN  01/04/2019     HPI:  Brandon Em is a 66 y o  female here for her Subsequent Wellness Visit      Patient Active Problem List   Diagnosis    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HCC)    Anemia    Asthma    Chronic pancreatitis ([facility] )    COPD with emphysema (HCC)    Degeneration of lumbosacral intervertebral disc    DM (diabetes mellitus) ([address] )    Dyslipidemia    Endobronchial mass    Fibromyalgia    Gastroesophageal reflux disease    Non-small cell cancer of right lung (HCC)    Restless legs syndrome    Anxiety    Protein calorie malnutrition (HCC)     Past Medical History:   Diagnosis Date    Cancer ([address] )     Diabetes mellitus ([address] )     borderline    Irregular heartbeat     Pancreatitis     Pre cancerous lesions per Elvera Laurie     Past Surgical History:   Procedure Laterality Date    BRONCHOSCOPY      CHOLECYSTECTOMY Family History   Problem Relation Age of Onset   Jimbo Spink Cancer Mother     COPD Mother     Diabetes Maternal Grandfather      Social History     Tobacco Use   Smoking Status Former Smoker    Packs/day: 2 00    Years: 40 00    Pack years: 80 00    Types: Cigarettes    Last attempt to quit: 2019    Years since quittin 1   Smokeless Tobacco Never Used     Social History     Substance and Sexual Activity   Alcohol Use No      Social History     Substance and Sexual Activity   Drug Use No       Current Outpatient Medications   Medication Sig Dispense Refill    ALPRAZolam (XANAX) 2 MG tablet Take 1 tablet (2 mg total) by mouth 3 (three) times a day as needed for anxiety or sleep 90 tablet 0    atorvastatin (LIPITOR) 40 mg tablet Take 1 tablet (40 mg total) by mouth daily 30 tablet 5    guaifenesin-codeine (GUAIFENESIN AC) 100-10 MG/5ML liquid Take 5 mL by mouth 3 (three) times a day as needed for cough 180 mL 0    oxyCODONE (ROXICODONE) 5 mg immediate release tablet Take 1 tablet (5 mg total) by mouth every 4 (four) hours as needed for moderate pain or severe painMax Daily Amount: 30 mg 180 tablet 0    polyethylene glycol (MIRALAX) 17 g packet Take 17 g by mouth daily   0    senna-docusate sodium (SENOKOT-S) 8 6-50 mg per tablet Take 1 tablet by mouth daily at bedtime       sertraline (ZOLOFT) 100 mg tablet Take 1 tablet by mouth daily at bedtime   0    temazepam (RESTORIL) 30 mg capsule Take 30 mg by mouth daily at bedtime  0    Masks (FACE MASK EARLOOP-STYLE) MISC by Does not apply route 3 (three) times a day as needed (Infection prevention) 150 each 5    Misc   Devices (COMMODE BEDSIDE) MISC by Does not apply route daily as needed (shortness of breath) 1 each 0    neomycin-polymyxin-hydrocortisone (CORTISPORIN) otic solution Administer 4 drops to the right ear every 6 (six) hours for 7 days 10 mL 0    senna-docusate sodium (SENOKOT-S) 8 6-50 mg per tablet Take 1 tablet by mouth       No current facility-administered medications for this visit  Allergies   Allergen Reactions    Sulfa Antibiotics Rash and Palpitations    Ephraim Flavor Hives    Codeine     Lisinopril Cough    Nsaids      daypro, orudis    can take motrin    Other      Steroid shots:  Heart race, shakes    Naproxen Rash    Prednisone Palpitations     Immunization History   Administered Date(s) Administered    Tdap 08/02/2007       Patient Care Team:  Ayaan Bell as PCP - General (Family Medicine)  Thelma Aceves MD (Oncology)  Margi Pham MD (Radiation Oncology)    Medicare Screening Tests and Risk Assessments:      Preventative Screening/Counseling:      Cardiovascular:      General: Patient Declines          Diabetes:      General: Patient Declines          Colorectal Cancer:      General: Screening Current          Breast Cancer:      General: Screening Current          Cervical Cancer:      General: Screening Current          Osteoporosis:      General: Patient Declines          AAA:      General: Screening Current          Glaucoma:      General: Risks and Benefits Discussed      Comments: Patient plans to schedule with Dr Ana Hamilton        HIV:      General: Screening Not Indicated          Hepatitis C:      General: Screening Not Indicated        Advanced Directives:   Patient has no living will for healthcare, does not have durable POA for healthcare, patient does not have an advanced directive  Additional Comments: In progress      /84 (BP Location: Left arm, Patient Position: Sitting, Cuff Size: Standard)   Pulse 102   Temp 99 6 °F (37 6 °C) (Temporal)   Ht 5' 6" (1 676 m)   Wt 48 8 kg (107 lb 9 6 oz)   SpO2 98%   BMI 17 37 kg/m²      Physical Exam   Constitutional: She is oriented to person, place, and time  She appears well-developed and well-nourished  No distress  Face mask in place  HENT:   Head: Normocephalic and atraumatic     Eyes: Conjunctivae and lids are normal    Neck: Normal range of motion  No tracheal deviation present  Pulmonary/Chest: Effort normal  No respiratory distress  Abdominal: Normal appearance  She exhibits no distension  There is no guarding  Musculoskeletal: Normal range of motion  Neurological: She is alert and oriented to person, place, and time  Skin: Skin is warm, dry and intact  Psychiatric: She has a normal mood and affect   Her speech is normal

## 2019-04-04 ENCOUNTER — OFFICE VISIT (OUTPATIENT)
Dept: FAMILY MEDICINE CLINIC | Facility: CLINIC | Age: 59
End: 2019-04-04
Payer: COMMERCIAL

## 2019-04-04 VITALS
WEIGHT: 118.2 LBS | BODY MASS INDEX: 18.99 KG/M2 | SYSTOLIC BLOOD PRESSURE: 120 MMHG | HEIGHT: 66 IN | TEMPERATURE: 99.2 F | HEART RATE: 93 BPM | DIASTOLIC BLOOD PRESSURE: 76 MMHG | OXYGEN SATURATION: 99 %

## 2019-04-04 DIAGNOSIS — Z91.89 AT HIGH RISK FOR INFECTION: ICD-10-CM

## 2019-04-04 DIAGNOSIS — F41.9 ANXIETY: ICD-10-CM

## 2019-04-04 DIAGNOSIS — C34.91 NON-SMALL CELL CANCER OF RIGHT LUNG (HCC): ICD-10-CM

## 2019-04-04 DIAGNOSIS — C34.91 ADENOSQUAMOUS CARCINOMA OF RIGHT LUNG (HCC): Primary | ICD-10-CM

## 2019-04-04 DIAGNOSIS — F41.8 DEPRESSION WITH ANXIETY: ICD-10-CM

## 2019-04-04 PROCEDURE — 99213 OFFICE O/P EST LOW 20 MIN: CPT | Performed by: NURSE PRACTITIONER

## 2019-04-04 RX ORDER — ADHESIVE BANDAGE 3/4"
BANDAGE TOPICAL DAILY
Qty: 1 EACH | Refills: 0 | Status: SHIPPED | OUTPATIENT
Start: 2019-04-04 | End: 2021-04-27

## 2019-04-04 RX ORDER — ALPRAZOLAM 2 MG/1
2 TABLET ORAL 3 TIMES DAILY PRN
Qty: 90 TABLET | Refills: 0 | Status: SHIPPED | OUTPATIENT
Start: 2019-04-04 | End: 2019-05-03 | Stop reason: SDUPTHER

## 2019-04-04 RX ORDER — SERTRALINE HYDROCHLORIDE 100 MG/1
100 TABLET, FILM COATED ORAL
Qty: 30 TABLET | Refills: 5 | Status: SHIPPED | OUTPATIENT
Start: 2019-04-04 | End: 2019-07-17 | Stop reason: SDUPTHER

## 2019-04-04 RX ORDER — ADHESIVE BANDAGE 3/4"
BANDAGE TOPICAL DAILY
Qty: 1 EACH | Refills: 0 | Status: SHIPPED | OUTPATIENT
Start: 2019-04-04 | End: 2019-04-04 | Stop reason: SDUPTHER

## 2019-04-04 RX ORDER — PROCHLORPERAZINE MALEATE 10 MG
1 TABLET ORAL EVERY 6 HOURS PRN
Refills: 3 | COMMUNITY
Start: 2019-03-22 | End: 2021-04-27

## 2019-04-04 RX ORDER — OXYCODONE HYDROCHLORIDE 5 MG/1
5 TABLET ORAL EVERY 4 HOURS PRN
Qty: 180 TABLET | Refills: 0 | Status: SHIPPED | OUTPATIENT
Start: 2019-04-04 | End: 2019-05-03 | Stop reason: SDUPTHER

## 2019-04-05 ENCOUNTER — TELEPHONE (OUTPATIENT)
Dept: FAMILY MEDICINE CLINIC | Facility: CLINIC | Age: 59
End: 2019-04-05

## 2019-04-05 DIAGNOSIS — K59.09 CHRONIC CONSTIPATION: Primary | ICD-10-CM

## 2019-04-05 RX ORDER — SENNA AND DOCUSATE SODIUM 50; 8.6 MG/1; MG/1
1 TABLET, FILM COATED ORAL
Qty: 30 TABLET | Refills: 5 | Status: SHIPPED | OUTPATIENT
Start: 2019-04-05 | End: 2021-04-27

## 2019-05-03 ENCOUNTER — OFFICE VISIT (OUTPATIENT)
Dept: FAMILY MEDICINE CLINIC | Facility: CLINIC | Age: 59
End: 2019-05-03
Payer: COMMERCIAL

## 2019-05-03 VITALS
WEIGHT: 120.6 LBS | SYSTOLIC BLOOD PRESSURE: 110 MMHG | BODY MASS INDEX: 19.38 KG/M2 | TEMPERATURE: 98.5 F | HEIGHT: 66 IN | HEART RATE: 87 BPM | DIASTOLIC BLOOD PRESSURE: 72 MMHG | OXYGEN SATURATION: 98 %

## 2019-05-03 DIAGNOSIS — C34.91 ADENOSQUAMOUS CARCINOMA OF RIGHT LUNG (HCC): Primary | ICD-10-CM

## 2019-05-03 DIAGNOSIS — R05.9 COUGH: ICD-10-CM

## 2019-05-03 DIAGNOSIS — C34.91 NON-SMALL CELL CANCER OF RIGHT LUNG (HCC): ICD-10-CM

## 2019-05-03 DIAGNOSIS — F41.9 ANXIETY: ICD-10-CM

## 2019-05-03 DIAGNOSIS — E78.2 MIXED HYPERLIPIDEMIA: ICD-10-CM

## 2019-05-03 PROCEDURE — 99213 OFFICE O/P EST LOW 20 MIN: CPT | Performed by: NURSE PRACTITIONER

## 2019-05-03 RX ORDER — OXYCODONE HYDROCHLORIDE 5 MG/1
5 TABLET ORAL EVERY 4 HOURS PRN
Qty: 180 TABLET | Refills: 0 | Status: SHIPPED | OUTPATIENT
Start: 2019-05-03 | End: 2019-06-04 | Stop reason: SDUPTHER

## 2019-05-03 RX ORDER — ALPRAZOLAM 2 MG/1
2 TABLET ORAL 3 TIMES DAILY PRN
Qty: 90 TABLET | Refills: 0 | Status: SHIPPED | OUTPATIENT
Start: 2019-05-03 | End: 2019-06-04 | Stop reason: SDUPTHER

## 2019-05-03 RX ORDER — ATORVASTATIN CALCIUM 40 MG/1
40 TABLET, FILM COATED ORAL DAILY
Qty: 30 TABLET | Refills: 5 | Status: SHIPPED | OUTPATIENT
Start: 2019-05-03 | End: 2019-07-17 | Stop reason: SDUPTHER

## 2019-05-03 RX ORDER — HYDROCODONE POLISTIREX AND CHLORPHENIRAMINE POLISTIREX 10; 8 MG/5ML; MG/5ML
5 SUSPENSION, EXTENDED RELEASE ORAL EVERY 12 HOURS PRN
Qty: 120 ML | Refills: 0 | Status: SHIPPED | OUTPATIENT
Start: 2019-05-03 | End: 2019-11-26 | Stop reason: SDUPTHER

## 2019-06-03 RX ORDER — PREDNISONE 1 MG/1
15 TABLET ORAL DAILY
COMMUNITY
Start: 2019-05-17 | End: 2021-04-27

## 2019-06-04 ENCOUNTER — OFFICE VISIT (OUTPATIENT)
Dept: FAMILY MEDICINE CLINIC | Facility: CLINIC | Age: 59
End: 2019-06-04
Payer: COMMERCIAL

## 2019-06-04 VITALS
HEIGHT: 66 IN | TEMPERATURE: 98.7 F | WEIGHT: 122 LBS | OXYGEN SATURATION: 99 % | RESPIRATION RATE: 14 BRPM | DIASTOLIC BLOOD PRESSURE: 76 MMHG | HEART RATE: 60 BPM | BODY MASS INDEX: 19.61 KG/M2 | SYSTOLIC BLOOD PRESSURE: 118 MMHG

## 2019-06-04 DIAGNOSIS — G47.20 DISTURBED SLEEP RHYTHM: Primary | ICD-10-CM

## 2019-06-04 DIAGNOSIS — C34.91 ADENOSQUAMOUS CARCINOMA OF RIGHT LUNG (HCC): ICD-10-CM

## 2019-06-04 DIAGNOSIS — C34.91 NON-SMALL CELL CANCER OF RIGHT LUNG (HCC): ICD-10-CM

## 2019-06-04 DIAGNOSIS — J43.9 PULMONARY EMPHYSEMA, UNSPECIFIED EMPHYSEMA TYPE (HCC): ICD-10-CM

## 2019-06-04 DIAGNOSIS — F41.9 ANXIETY: ICD-10-CM

## 2019-06-04 PROCEDURE — 99213 OFFICE O/P EST LOW 20 MIN: CPT | Performed by: NURSE PRACTITIONER

## 2019-06-04 RX ORDER — OXYCODONE HYDROCHLORIDE 5 MG/1
5 TABLET ORAL EVERY 4 HOURS PRN
Qty: 180 TABLET | Refills: 0 | Status: SHIPPED | OUTPATIENT
Start: 2019-06-04 | End: 2019-07-02 | Stop reason: SDUPTHER

## 2019-06-04 RX ORDER — ALPRAZOLAM 2 MG/1
2 TABLET ORAL 3 TIMES DAILY PRN
Qty: 90 TABLET | Refills: 0 | Status: SHIPPED | OUTPATIENT
Start: 2019-06-04 | End: 2019-07-02 | Stop reason: SDUPTHER

## 2019-06-27 ENCOUNTER — TELEPHONE (OUTPATIENT)
Dept: FAMILY MEDICINE CLINIC | Facility: CLINIC | Age: 59
End: 2019-06-27

## 2019-07-02 ENCOUNTER — OFFICE VISIT (OUTPATIENT)
Dept: FAMILY MEDICINE CLINIC | Facility: CLINIC | Age: 59
End: 2019-07-02
Payer: COMMERCIAL

## 2019-07-02 ENCOUNTER — TELEPHONE (OUTPATIENT)
Dept: FAMILY MEDICINE CLINIC | Facility: CLINIC | Age: 59
End: 2019-07-02

## 2019-07-02 VITALS
SYSTOLIC BLOOD PRESSURE: 130 MMHG | WEIGHT: 123.4 LBS | BODY MASS INDEX: 19.83 KG/M2 | TEMPERATURE: 97.5 F | OXYGEN SATURATION: 97 % | HEART RATE: 86 BPM | HEIGHT: 66 IN | DIASTOLIC BLOOD PRESSURE: 90 MMHG | RESPIRATION RATE: 18 BRPM

## 2019-07-02 DIAGNOSIS — E78.2 MIXED HYPERLIPIDEMIA: ICD-10-CM

## 2019-07-02 DIAGNOSIS — G47.00 INSOMNIA, UNSPECIFIED TYPE: ICD-10-CM

## 2019-07-02 DIAGNOSIS — C34.91 ADENOSQUAMOUS CARCINOMA OF RIGHT LUNG (HCC): Primary | ICD-10-CM

## 2019-07-02 DIAGNOSIS — G43.811 OTHER MIGRAINE WITH STATUS MIGRAINOSUS, INTRACTABLE: ICD-10-CM

## 2019-07-02 DIAGNOSIS — C34.91 NON-SMALL CELL CANCER OF RIGHT LUNG (HCC): ICD-10-CM

## 2019-07-02 DIAGNOSIS — E11.9 TYPE 2 DIABETES MELLITUS WITHOUT COMPLICATION, WITHOUT LONG-TERM CURRENT USE OF INSULIN (HCC): ICD-10-CM

## 2019-07-02 DIAGNOSIS — F41.9 ANXIETY: ICD-10-CM

## 2019-07-02 PROCEDURE — 3008F BODY MASS INDEX DOCD: CPT | Performed by: NURSE PRACTITIONER

## 2019-07-02 PROCEDURE — 1036F TOBACCO NON-USER: CPT | Performed by: NURSE PRACTITIONER

## 2019-07-02 PROCEDURE — 99214 OFFICE O/P EST MOD 30 MIN: CPT | Performed by: NURSE PRACTITIONER

## 2019-07-02 RX ORDER — TEMAZEPAM 30 MG/1
30 CAPSULE ORAL
Qty: 30 CAPSULE | Refills: 0 | Status: SHIPPED | OUTPATIENT
Start: 2019-07-02 | End: 2019-08-02 | Stop reason: SDUPTHER

## 2019-07-02 RX ORDER — ALPRAZOLAM 2 MG/1
2 TABLET ORAL 3 TIMES DAILY PRN
Qty: 90 TABLET | Refills: 0 | Status: SHIPPED | OUTPATIENT
Start: 2019-07-02 | End: 2019-08-02 | Stop reason: SDUPTHER

## 2019-07-02 RX ORDER — OXYCODONE HYDROCHLORIDE 5 MG/1
5 TABLET ORAL EVERY 4 HOURS PRN
Qty: 180 TABLET | Refills: 0 | Status: SHIPPED | OUTPATIENT
Start: 2019-07-02 | End: 2019-08-02 | Stop reason: SDUPTHER

## 2019-07-02 RX ORDER — SUMATRIPTAN 50 MG/1
50 TABLET, FILM COATED ORAL DAILY PRN
Qty: 30 TABLET | Refills: 0 | Status: SHIPPED | OUTPATIENT
Start: 2019-07-02 | End: 2021-04-27

## 2019-07-02 NOTE — PATIENT INSTRUCTIONS
Insomnia   WHAT YOU NEED TO KNOW:   What is insomnia? Insomnia is a condition that makes it hard to fall or stay asleep  Lack of sleep can lead to attention or memory problems during the day  You may also be sahu, depressed, clumsy, or have headaches  What increases my risk for insomnia? · Older age    · Stress or worry    · A medical condition, such as sleep apnea, GERD, COPD, or asthma    · A mental health condition, such as depression or anxiety    · Blood pressure medicines or antidepressants    · Odd work schedules or frequent travel  How is insomnia diagnosed? Your healthcare provider will ask when your symptoms began and how often you cannot sleep  He will ask if you take any medicines that can cause insomnia, such as blood pressure medicine  He will ask if you have a medical condition, such as GERD, or a mental health condition, such as depression  He may also have you take a survey about your sleep  How is insomnia treated? · Cognitive behavioral therapy (CBT)  helps you find ways to relax, decrease stress, and improve sleep  · Medicines  may help you sleep more regularly or help you feel less anxious  Take them as directed  What can I do to improve my sleep? · Create a sleep schedule  This will help you form a sleep routine  Keep a record of your sleep patterns, and any sleeping problems you have  Bring the record to follow-up visits with healthcare providers  · Do not take naps  Naps could make it hard for you to fall asleep at bedtime  · Keep your bedroom cool, quiet, and dark  Turn on white noise, such as a fan, to help you relax  Do not use your bed for any activity that will keep you awake  Do not read, exercise, eat, or watch TV in your bedroom  · Get up if you do not fall asleep within 20 minutes  Move to another room and do something relaxing until you become sleepy  · Limit caffeine, alcohol, and food to earlier in the day  Only drink caffeine in the morning   Do not drink alcohol within 6 hours of bedtime  Do not eat a heavy meal right before you go to bed  · Exercise regularly  Daily exercise may help you sleep better  Do not exercise within 4 hours of bedtime  When should I contact my healthcare provider? · Your symptoms do not get better, or they get worse  · You begin to use drugs or alcohol to fall asleep  · You have questions or concerns about your condition or care  CARE AGREEMENT:   You have the right to help plan your care  Learn about your health condition and how it may be treated  Discuss treatment options with your caregivers to decide what care you want to receive  You always have the right to refuse treatment  The above information is an  only  It is not intended as medical advice for individual conditions or treatments  Talk to your doctor, nurse or pharmacist before following any medical regimen to see if it is safe and effective for you  © 2017 [address] [address]  Information is for End User's use only and may not be sold, redistributed or otherwise used for commercial purposes  All illustrations and images included in CareNotes® are the copyrighted property of A D A M [employer] Inc  or Arturo Osorio

## 2019-07-02 NOTE — PROGRESS NOTES
Assessment/Plan:     Diagnoses and all orders for this visit:    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HCC)  -     oxyCODONE (ROXICODONE) 5 mg immediate release tablet; Take 1 tablet (5 mg total) by mouth every 4 (four) hours as needed for moderate pain or severe painMax Daily Amount: 30 mg  -     Oxygen Supplies  -     Oxygen  -     Portable Concentrators    Non-small cell cancer of right lung (HCC)  -     oxyCODONE (ROXICODONE) 5 mg immediate release tablet; Take 1 tablet (5 mg total) by mouth every 4 (four) hours as needed for moderate pain or severe painMax Daily Amount: 30 mg  -     Oxygen Supplies  -     Oxygen  -     Portable Concentrators    Anxiety  Comments:  Stable; continue current regimen  Orders:  -     ALPRAZolam (XANAX) 2 MG tablet; Take 1 tablet (2 mg total) by mouth 3 (three) times a day as needed for anxiety or sleep    Insomnia, unspecified type  Comments:  Restart Restoril PRN  Orders:  -     temazepam (RESTORIL) 30 mg capsule; Take 1 capsule (30 mg total) by mouth daily at bedtime    Other migraine with status migrainosus, intractable  Comments:  Trial PRN Imitrex   Orders:  -     SUMAtriptan (IMITREX) 50 mg tablet; Take 1 tablet (50 mg total) by mouth daily as needed for migraine for up to 1 dose    Mixed hyperlipidemia  -     Lipid panel; Future    Type 2 diabetes mellitus without complication, without long-term current use of insulin ([facility])  Comments:  Patient to follow up for annual eye exam at her convenience   Orders:  -      Diabetes Eye Exam        Subjective:      Patient ID: Ivette Guadalupe is a 64 y o  female  Patient presents to [address] [place] [address] as follow up  Allergies, medical history and current medications reviewed with patient; patient reports taking all medications as prescribed without issues  Patient C/O symptoms of feeling "aggressive" and "on edge" since starting immunotherapy   Patient states she "snapped" at people several times, and is also upset as the anniversary of her mother's passing is next week  Patient also states that she experiences significant migraine headaches with associated symptoms of light and sound sensitivity, for about 1-2 weeks after receiving immunotherapy  Patient does not that she has been responding well to treatment and recent CT scan confirms therapeutic response  Patient is requesting oxygen and supplies to be sent to [address] [place] [address] in [place]  Patient states she was using the humidified oxygen, but that it malfunctioned and filled her oxygen tubing with water  Patient states she has not been needing oxygen continuously; patient is not using supplemental oxygen during today's visit  Patient states she does experience significant migraine headaches after receiving doses of immunotherapy  Patient also reports she was seen by a free optical clinic in [facility], and will be receiving new glasses  Addendum:   After the patient's visit, [facility] returned phone call regarding restarting Restoril for sleep, which was approved  Additionally, I reported the patient's migraine symptoms associated with immunotherapy treatment; per Dr Ant Bell, we are cleared to treat PRN migraines with medication, as appropriate  Patient Care Team:  Carrillo Mendez as PCP - General (Family Medicine)  Isra Odlel MD (Oncology)  Halina Conteh MD (Radiation Oncology)    Review of Systems   Neurological: Positive for headaches  Psychiatric/Behavioral: Positive for agitation  All other systems reviewed and are negative  Objective:    /90 (BP Location: Left arm, Patient Position: Sitting, Cuff Size: Large)   Pulse 86   Temp 97 5 °F (36 4 °C) (Temporal)   Resp 18   Ht 5' 6" (1 676 m)   Wt 56 kg (123 lb 6 4 oz)   SpO2 97%   BMI 19 92 kg/m²      Physical Exam   Constitutional: She is oriented to person, place, and time  She appears well-developed and well-nourished  No distress     HENT:   Head: Normocephalic and atraumatic  Eyes: Conjunctivae and lids are normal  Right eye exhibits no discharge  Left eye exhibits no discharge  Right conjunctiva is not injected  Left conjunctiva is not injected  Neck: Normal range of motion  No tracheal deviation present  Cardiovascular: Normal rate, regular rhythm, S1 normal, S2 normal and normal heart sounds  No murmur heard  Pulmonary/Chest: Effort normal and breath sounds normal  No respiratory distress  Abdominal: Normal appearance  She exhibits no distension  There is no guarding  Musculoskeletal: Normal range of motion  She exhibits no edema, tenderness or deformity  Neurological: She is alert and oriented to person, place, and time  Skin: Skin is warm, dry and intact  Psychiatric: She has a normal mood and affect  Her speech is normal    Nursing note and vitals reviewed

## 2019-07-17 DIAGNOSIS — F41.8 DEPRESSION WITH ANXIETY: ICD-10-CM

## 2019-07-17 DIAGNOSIS — E78.2 MIXED HYPERLIPIDEMIA: ICD-10-CM

## 2019-07-17 RX ORDER — SERTRALINE HYDROCHLORIDE 100 MG/1
100 TABLET, FILM COATED ORAL
Qty: 30 TABLET | Refills: 5 | Status: SHIPPED | OUTPATIENT
Start: 2019-07-17 | End: 2019-08-30

## 2019-07-17 RX ORDER — ATORVASTATIN CALCIUM 40 MG/1
40 TABLET, FILM COATED ORAL DAILY
Qty: 30 TABLET | Refills: 5 | Status: SHIPPED | OUTPATIENT
Start: 2019-07-17 | End: 2019-10-25 | Stop reason: SDUPTHER

## 2019-07-23 ENCOUNTER — TELEPHONE (OUTPATIENT)
Dept: FAMILY MEDICINE CLINIC | Facility: CLINIC | Age: 59
End: 2019-07-23

## 2019-07-23 NOTE — TELEPHONE ENCOUNTER
Pt called and said her oxygen is dropping into the 80s, her bp (the lower number) is in the 80s and it hurts to take a deep breath, getting the chilld and temp is 98 7 she wasn't sure if she needed to come in or if she should call oncology  I told her it would probably be better to call her specialist  Let me know what you want to do

## 2019-08-02 ENCOUNTER — OFFICE VISIT (OUTPATIENT)
Dept: FAMILY MEDICINE CLINIC | Facility: CLINIC | Age: 59
End: 2019-08-02
Payer: COMMERCIAL

## 2019-08-02 VITALS
BODY MASS INDEX: 20.09 KG/M2 | OXYGEN SATURATION: 96 % | DIASTOLIC BLOOD PRESSURE: 78 MMHG | HEIGHT: 66 IN | WEIGHT: 125 LBS | SYSTOLIC BLOOD PRESSURE: 124 MMHG | HEART RATE: 72 BPM

## 2019-08-02 DIAGNOSIS — R07.81 CHEST PAIN, PLEURITIC: Primary | ICD-10-CM

## 2019-08-02 DIAGNOSIS — C34.91 ADENOSQUAMOUS CARCINOMA OF RIGHT LUNG (HCC): ICD-10-CM

## 2019-08-02 DIAGNOSIS — G47.00 INSOMNIA, UNSPECIFIED TYPE: ICD-10-CM

## 2019-08-02 DIAGNOSIS — C34.91 NON-SMALL CELL CANCER OF RIGHT LUNG (HCC): ICD-10-CM

## 2019-08-02 DIAGNOSIS — F41.9 ANXIETY: ICD-10-CM

## 2019-08-02 PROCEDURE — 1036F TOBACCO NON-USER: CPT | Performed by: NURSE PRACTITIONER

## 2019-08-02 PROCEDURE — 3008F BODY MASS INDEX DOCD: CPT | Performed by: NURSE PRACTITIONER

## 2019-08-02 PROCEDURE — 99213 OFFICE O/P EST LOW 20 MIN: CPT | Performed by: NURSE PRACTITIONER

## 2019-08-02 RX ORDER — TEMAZEPAM 30 MG/1
30 CAPSULE ORAL
Qty: 30 CAPSULE | Refills: 0 | Status: SHIPPED | OUTPATIENT
Start: 2019-08-02 | End: 2019-08-30 | Stop reason: SDUPTHER

## 2019-08-02 RX ORDER — OXYCODONE HYDROCHLORIDE 5 MG/1
5 TABLET ORAL EVERY 4 HOURS PRN
Qty: 180 TABLET | Refills: 0 | Status: SHIPPED | OUTPATIENT
Start: 2019-08-02 | End: 2019-08-30 | Stop reason: SDUPTHER

## 2019-08-02 RX ORDER — ALPRAZOLAM 2 MG/1
2 TABLET ORAL 3 TIMES DAILY PRN
Qty: 90 TABLET | Refills: 0 | Status: SHIPPED | OUTPATIENT
Start: 2019-08-02 | End: 2019-08-30 | Stop reason: SDUPTHER

## 2019-08-02 NOTE — PROGRESS NOTES
Assessment/Plan:     Diagnoses and all orders for this visit:    Chest pain, pleuritic  Comments:  Consult Dr Jesica Go regarding potential for Singular to improve symptoms    Insomnia, unspecified type  Comments:  Continue current  Orders:  -     temazepam (RESTORIL) 30 mg capsule; Take 1 capsule (30 mg total) by mouth daily at bedtime    Anxiety  Comments:  Continue current  Orders:  -     ALPRAZolam (XANAX) 2 MG tablet; Take 1 tablet (2 mg total) by mouth 3 (three) times a day as needed for anxiety or sleep    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[facility] [address] )  Comments:  Continue current  Orders:  -     oxyCODONE (ROXICODONE) 5 mg immediate release tablet; Take 1 tablet (5 mg total) by mouth every 4 (four) hours as needed for moderate pain or severe painMax Daily Amount: 30 mg    Non-small cell cancer of right lung (HCC)  -     oxyCODONE (ROXICODONE) 5 mg immediate release tablet; Take 1 tablet (5 mg total) by mouth every 4 (four) hours as needed for moderate pain or severe painMax Daily Amount: 30 mg        Subjective:      Patient ID: Gaynelle Lesches is a 64 y o  female  Patient presents to [address] [place] [address] as follow up  Allergies, medical history and current medications reviewed with patient; patient reports taking all medications as prescribed without issues  Patient continues to follow with Hematology Oncology Dr Jesica Go for treatment of lung cancer  Patient states she notices pain to the bilateral chest with breathing or coughing, but does not feel pain with regular breathing  Patient states it seems to be worst with changes in weather or exposure to dust  Patient states she has historically been unable to tolerate inhalers  Patient Care Team:  Trudi Leyva as PCP - General (Family Medicine)  Jhon Palmer MD (Oncology)  Larisa Maher MD (Radiation Oncology)    Review of Systems   Cardiovascular: Positive for chest pain (non-cardiac)     All other systems reviewed and are negative  Objective:    /78   Pulse 72   Ht 5' 6" (1 676 m)   Wt 56 7 kg (125 lb)   SpO2 96%   BMI 20 18 kg/m²      Physical Exam   Constitutional: She is oriented to person, place, and time  She appears well-developed and well-nourished  No distress  HENT:   Head: Normocephalic and atraumatic  Eyes: Conjunctivae and lids are normal    Neck: Normal range of motion  No tracheal deviation present  Cardiovascular: Normal rate, regular rhythm, S1 normal, S2 normal and normal heart sounds  No murmur heard  Pulmonary/Chest: Effort normal and breath sounds normal  No respiratory distress  Abdominal: Normal appearance  She exhibits no distension  There is no guarding  Musculoskeletal: Normal range of motion  She exhibits no edema, tenderness or deformity  Neurological: She is alert and oriented to person, place, and time  Skin: Skin is warm, dry and intact  Psychiatric: She has a normal mood and affect  Her speech is normal    Nursing note and vitals reviewed

## 2019-08-19 ENCOUNTER — TELEPHONE (OUTPATIENT)
Dept: FAMILY MEDICINE CLINIC | Facility: CLINIC | Age: 59
End: 2019-08-19

## 2019-08-30 ENCOUNTER — OFFICE VISIT (OUTPATIENT)
Dept: FAMILY MEDICINE CLINIC | Facility: CLINIC | Age: 59
End: 2019-08-30
Payer: COMMERCIAL

## 2019-08-30 VITALS
BODY MASS INDEX: 20.06 KG/M2 | OXYGEN SATURATION: 96 % | HEART RATE: 67 BPM | HEIGHT: 66 IN | DIASTOLIC BLOOD PRESSURE: 78 MMHG | WEIGHT: 124.8 LBS | SYSTOLIC BLOOD PRESSURE: 126 MMHG

## 2019-08-30 DIAGNOSIS — F41.9 ANXIETY: ICD-10-CM

## 2019-08-30 DIAGNOSIS — G47.00 INSOMNIA, UNSPECIFIED TYPE: ICD-10-CM

## 2019-08-30 DIAGNOSIS — F41.8 DEPRESSION WITH ANXIETY: ICD-10-CM

## 2019-08-30 DIAGNOSIS — Z09 FOLLOW-UP EXAM, LESS THAN 3 MONTHS SINCE PREVIOUS EXAM: Primary | ICD-10-CM

## 2019-08-30 DIAGNOSIS — C34.91 ADENOSQUAMOUS CARCINOMA OF RIGHT LUNG (HCC): ICD-10-CM

## 2019-08-30 DIAGNOSIS — C34.91 NON-SMALL CELL CANCER OF RIGHT LUNG (HCC): ICD-10-CM

## 2019-08-30 PROBLEM — E11.9 DM (DIABETES MELLITUS) (HCC): Status: RESOLVED | Noted: 2019-01-15 | Resolved: 2019-08-30

## 2019-08-30 PROCEDURE — 99213 OFFICE O/P EST LOW 20 MIN: CPT | Performed by: NURSE PRACTITIONER

## 2019-08-30 RX ORDER — OXYCODONE HYDROCHLORIDE 5 MG/1
5 TABLET ORAL EVERY 4 HOURS PRN
Qty: 180 TABLET | Refills: 0 | Status: SHIPPED | OUTPATIENT
Start: 2019-08-30 | End: 2019-09-27 | Stop reason: SDUPTHER

## 2019-08-30 RX ORDER — ALPRAZOLAM 2 MG/1
2 TABLET ORAL 3 TIMES DAILY PRN
Qty: 90 TABLET | Refills: 0 | Status: SHIPPED | OUTPATIENT
Start: 2019-08-30 | End: 2019-09-27 | Stop reason: SDUPTHER

## 2019-08-30 RX ORDER — TEMAZEPAM 30 MG/1
30 CAPSULE ORAL
Qty: 30 CAPSULE | Refills: 0 | Status: SHIPPED | OUTPATIENT
Start: 2019-08-30 | End: 2019-09-27 | Stop reason: SDUPTHER

## 2019-08-30 RX ORDER — SERTRALINE HYDROCHLORIDE 100 MG/1
150 TABLET, FILM COATED ORAL
Qty: 30 TABLET | Refills: 5
Start: 2019-08-30 | End: 2019-10-25 | Stop reason: SDUPTHER

## 2019-08-30 NOTE — PROGRESS NOTES
Assessment/Plan:     Diagnoses and all orders for this visit:    Follow-up exam, less than 3 months since previous exam    Depression with anxiety  -     sertraline (ZOLOFT) 100 mg tablet; Take 1 5 tablets (150 mg total) by mouth daily at bedtime    Anxiety  -     ALPRAZolam (XANAX) 2 MG tablet; Take 1 tablet (2 mg total) by mouth 3 (three) times a day as needed for anxiety or sleep    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HCC)  -     oxyCODONE (ROXICODONE) 5 mg immediate release tablet; Take 1 tablet (5 mg total) by mouth every 4 (four) hours as needed for moderate pain or severe painMax Daily Amount: 30 mg    Non-small cell cancer of right lung (HCC)  -     oxyCODONE (ROXICODONE) 5 mg immediate release tablet; Take 1 tablet (5 mg total) by mouth every 4 (four) hours as needed for moderate pain or severe painMax Daily Amount: 30 mg    Insomnia, unspecified type  -     temazepam (RESTORIL) 30 mg capsule; Take 1 capsule (30 mg total) by mouth daily at bedtime        Subjective:      Patient ID: Ezequiel Arevalo is a 64 y o  female  Patient presents to [address] [place] [address] as follow up  Allergies, medical history and current medications reviewed with patient; patient reports taking all medications as prescribed without issues  Patient continues to follow with Oncology Dr Tsering Galindo; patient reports Dr Tsering Galindo increased her Zoloft back to 150 mg daily  Patient also reports she is scheduled for 6 more treatments with Dr Tsering Galindo  Patient states she feels well overall and denies any current problems or complaints  Patient Care Team:  Kan Farias as PCP - General (Family Medicine)  Wei Thomas MD (Oncology)  Kiki Tsang MD (Radiation Oncology)    Review of Systems   Cardiovascular: Negative for chest pain (improved)  All other systems reviewed and are negative      Objective:    /78   Pulse 67   Ht 5' 6" (1 676 m)   Wt 56 6 kg (124 lb 12 8 oz)   SpO2 96%   BMI 20 14 kg/m² Physical Exam   Constitutional: She is oriented to person, place, and time  She appears well-developed and well-nourished  No distress  HENT:   Head: Normocephalic and atraumatic  Eyes: Conjunctivae and lids are normal    Neck: Normal range of motion  No tracheal deviation present  Cardiovascular: Normal rate, regular rhythm, S1 normal, S2 normal and normal heart sounds  No murmur heard  Pulmonary/Chest: Effort normal and breath sounds normal  No respiratory distress  Abdominal: Normal appearance  She exhibits no distension  There is no guarding  Musculoskeletal: Normal range of motion  She exhibits no edema, tenderness or deformity  Neurological: She is alert and oriented to person, place, and time  Skin: Skin is warm, dry and intact  Psychiatric: She has a normal mood and affect  Her speech is normal    Nursing note and vitals reviewed

## 2019-09-05 PROBLEM — E11.9 DIABETES MELLITUS (HCC): Status: RESOLVED | Noted: 2019-01-15 | Resolved: 2019-09-05

## 2019-09-27 ENCOUNTER — OFFICE VISIT (OUTPATIENT)
Dept: FAMILY MEDICINE CLINIC | Facility: CLINIC | Age: 59
End: 2019-09-27
Payer: COMMERCIAL

## 2019-09-27 VITALS
DIASTOLIC BLOOD PRESSURE: 68 MMHG | SYSTOLIC BLOOD PRESSURE: 112 MMHG | HEART RATE: 93 BPM | TEMPERATURE: 99.4 F | BODY MASS INDEX: 20.09 KG/M2 | OXYGEN SATURATION: 96 % | WEIGHT: 125 LBS | HEIGHT: 66 IN

## 2019-09-27 DIAGNOSIS — Z09 FOLLOW UP: Primary | ICD-10-CM

## 2019-09-27 DIAGNOSIS — G47.00 INSOMNIA, UNSPECIFIED TYPE: ICD-10-CM

## 2019-09-27 DIAGNOSIS — J34.89 DRY NARES: ICD-10-CM

## 2019-09-27 DIAGNOSIS — C34.91 NON-SMALL CELL CANCER OF RIGHT LUNG (HCC): ICD-10-CM

## 2019-09-27 DIAGNOSIS — Z78.9 ON SUPPLEMENTAL OXYGEN BY NASAL CANNULA: ICD-10-CM

## 2019-09-27 DIAGNOSIS — C34.91 ADENOSQUAMOUS CARCINOMA OF RIGHT LUNG (HCC): ICD-10-CM

## 2019-09-27 DIAGNOSIS — F41.9 ANXIETY: ICD-10-CM

## 2019-09-27 PROCEDURE — 99213 OFFICE O/P EST LOW 20 MIN: CPT | Performed by: NURSE PRACTITIONER

## 2019-09-27 RX ORDER — OXYCODONE HYDROCHLORIDE 5 MG/1
5 TABLET ORAL EVERY 4 HOURS PRN
Qty: 180 TABLET | Refills: 0 | Status: SHIPPED | OUTPATIENT
Start: 2019-09-27 | End: 2019-10-25 | Stop reason: SDUPTHER

## 2019-09-27 RX ORDER — OXYCODONE HYDROCHLORIDE 5 MG/1
5 TABLET ORAL EVERY 4 HOURS PRN
Qty: 180 TABLET | Refills: 0 | Status: CANCELLED | OUTPATIENT
Start: 2019-09-27

## 2019-09-27 RX ORDER — ALPRAZOLAM 2 MG/1
2 TABLET ORAL 3 TIMES DAILY PRN
Qty: 90 TABLET | Refills: 0 | Status: SHIPPED | OUTPATIENT
Start: 2019-09-27 | End: 2019-10-25 | Stop reason: SDUPTHER

## 2019-09-27 RX ORDER — TEMAZEPAM 30 MG/1
30 CAPSULE ORAL
Qty: 30 CAPSULE | Refills: 0 | Status: SHIPPED | OUTPATIENT
Start: 2019-09-27 | End: 2019-10-25 | Stop reason: SDUPTHER

## 2019-09-27 NOTE — PROGRESS NOTES
Assessment/Plan:     Diagnoses and all orders for this visit:    Follow up    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[facility] [address] )  Comments:  Previous  unavailable; patient plans to discuss with Oncologist  Orders:  -     oxyCODONE (ROXICODONE) 5 mg immediate release tablet; Take 1 tablet (5 mg total) by mouth every 4 (four) hours as needed for moderate pain or severe painMax Daily Amount: 30 mg    Non-small cell cancer of right lung (HCC)  -     oxyCODONE (ROXICODONE) 5 mg immediate release tablet; Take 1 tablet (5 mg total) by mouth every 4 (four) hours as needed for moderate pain or severe painMax Daily Amount: 30 mg    Anxiety  -     ALPRAZolam (XANAX) 2 MG tablet; Take 1 tablet (2 mg total) by mouth 3 (three) times a day as needed for anxiety or sleep    Insomnia, unspecified type  -     temazepam (RESTORIL) 30 mg capsule; Take 1 capsule (30 mg total) by mouth daily at bedtime    Dry nares  Comments:  Start PRN Bactroban nasal  Orders:  -     mupirocin (BACTROBAN) 2 % nasal ointment; into each nostril 2 (two) times a day    On supplemental oxygen by nasal cannula  -     mupirocin (BACTROBAN) 2 % nasal ointment; into each nostril 2 (two) times a day    Other orders  -     Cancel: oxyCODONE (ROXICODONE) 5 mg immediate release tablet; Take 1 tablet (5 mg total) by mouth every 4 (four) hours as needed for moderate pain or severe painMax Daily Amount: 30 mg        Subjective:      Patient ID: Nikki Mccann is a 64 y o  female  Patient presents to [address] [place] [address] for routine follow up  Allergies, medical history and current medications reviewed with patient; patient reports taking all medications as prescribed without issues  Patient C/O upset stomach, increased gas and decreased appetite; patient reports that last month she received Oxycodone from a different  and symptoms started afterwards  Patient also reports nasal irritation from her oxygen tubing   Patient states she spoke with [id number], who will be giving her different tubing  Patient Care Team:  [address] [place] [address] as PCP - General (Family Medicine)  Brian Peña MD (Oncology)  Adriel Berry MD (Radiation Oncology)    Review of Systems   Gastrointestinal: Positive for nausea  Skin: Positive for rash  All other systems reviewed and are negative  Objective:    /68 (BP Location: Right arm, Patient Position: Sitting, Cuff Size: Adult)   Pulse 93   Temp 99 4 °F (37 4 °C) (Temporal)   Ht 5' 6" (1 676 m)   Wt 56 7 kg (125 lb)   SpO2 96%   BMI 20 18 kg/m²      Physical Exam   Constitutional: She is oriented to person, place, and time  She appears well-developed and well-nourished  No distress  HENT:   Head: Normocephalic and atraumatic  Nose: Mucosal edema present  Eyes: Conjunctivae and lids are normal    Neck: Normal range of motion  No tracheal deviation present  Cardiovascular: Normal rate, regular rhythm, S1 normal, S2 normal and normal heart sounds  No murmur heard  Pulmonary/Chest: Effort normal  No respiratory distress  She has decreased breath sounds  Abdominal: Normal appearance  She exhibits no distension  There is no guarding  Musculoskeletal: Normal range of motion  She exhibits no edema, tenderness or deformity  Neurological: She is alert and oriented to person, place, and time  Skin: Skin is warm, dry and intact  Psychiatric: She has a normal mood and affect  Her speech is normal    Nursing note and vitals reviewed

## 2019-10-25 ENCOUNTER — OFFICE VISIT (OUTPATIENT)
Dept: FAMILY MEDICINE CLINIC | Facility: CLINIC | Age: 59
End: 2019-10-25
Payer: COMMERCIAL

## 2019-10-25 VITALS
OXYGEN SATURATION: 98 % | SYSTOLIC BLOOD PRESSURE: 108 MMHG | HEIGHT: 66 IN | WEIGHT: 125 LBS | BODY MASS INDEX: 20.09 KG/M2 | HEART RATE: 91 BPM | DIASTOLIC BLOOD PRESSURE: 68 MMHG

## 2019-10-25 DIAGNOSIS — F41.8 DEPRESSION WITH ANXIETY: ICD-10-CM

## 2019-10-25 DIAGNOSIS — C34.91 NON-SMALL CELL CANCER OF RIGHT LUNG (HCC): ICD-10-CM

## 2019-10-25 DIAGNOSIS — Z09 FOLLOW UP: Primary | ICD-10-CM

## 2019-10-25 DIAGNOSIS — G47.00 INSOMNIA, UNSPECIFIED TYPE: ICD-10-CM

## 2019-10-25 DIAGNOSIS — F41.9 ANXIETY: ICD-10-CM

## 2019-10-25 DIAGNOSIS — C34.91 ADENOSQUAMOUS CARCINOMA OF RIGHT LUNG (HCC): ICD-10-CM

## 2019-10-25 DIAGNOSIS — E78.2 MIXED HYPERLIPIDEMIA: ICD-10-CM

## 2019-10-25 PROCEDURE — 3008F BODY MASS INDEX DOCD: CPT | Performed by: NURSE PRACTITIONER

## 2019-10-25 PROCEDURE — 99213 OFFICE O/P EST LOW 20 MIN: CPT | Performed by: NURSE PRACTITIONER

## 2019-10-25 RX ORDER — OXYCODONE HYDROCHLORIDE 5 MG/1
5 TABLET ORAL EVERY 4 HOURS PRN
Qty: 180 TABLET | Refills: 0 | Status: SHIPPED | OUTPATIENT
Start: 2019-10-25 | End: 2019-11-26 | Stop reason: SDUPTHER

## 2019-10-25 RX ORDER — SERTRALINE HYDROCHLORIDE 100 MG/1
150 TABLET, FILM COATED ORAL
Qty: 135 TABLET | Refills: 3
Start: 2019-10-25 | End: 2020-01-21 | Stop reason: SDUPTHER

## 2019-10-25 RX ORDER — ALPRAZOLAM 2 MG/1
2 TABLET ORAL 3 TIMES DAILY PRN
Qty: 90 TABLET | Refills: 0 | Status: SHIPPED | OUTPATIENT
Start: 2019-10-25 | End: 2019-11-25 | Stop reason: SDUPTHER

## 2019-10-25 RX ORDER — TEMAZEPAM 30 MG/1
30 CAPSULE ORAL
Qty: 30 CAPSULE | Refills: 0 | Status: SHIPPED | OUTPATIENT
Start: 2019-10-25 | End: 2019-11-26 | Stop reason: SDUPTHER

## 2019-10-25 RX ORDER — DIPHENOXYLATE HYDROCHLORIDE AND ATROPINE SULFATE 2.5; .025 MG/1; MG/1
1 TABLET ORAL 4 TIMES DAILY PRN
COMMUNITY
Start: 2019-10-04 | End: 2020-04-01

## 2019-10-25 RX ORDER — ATORVASTATIN CALCIUM 40 MG/1
40 TABLET, FILM COATED ORAL DAILY
Qty: 90 TABLET | Refills: 3 | Status: SHIPPED | OUTPATIENT
Start: 2019-10-25 | End: 2020-01-21 | Stop reason: SDUPTHER

## 2019-10-25 NOTE — PROGRESS NOTES
Assessment/Plan:     Diagnoses and all orders for this visit:    Follow up    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HCC)  -     oxyCODONE (ROXICODONE) 5 mg immediate release tablet; Take 1 tablet (5 mg total) by mouth every 4 (four) hours as needed for moderate pain or severe painMax Daily Amount: 30 mg    Non-small cell cancer of right lung (HCC)  -     oxyCODONE (ROXICODONE) 5 mg immediate release tablet; Take 1 tablet (5 mg total) by mouth every 4 (four) hours as needed for moderate pain or severe painMax Daily Amount: 30 mg    Depression with anxiety  -     sertraline (ZOLOFT) 100 mg tablet; Take 1 5 tablets (150 mg total) by mouth daily at bedtime    Anxiety  -     ALPRAZolam (XANAX) 2 MG tablet; Take 1 tablet (2 mg total) by mouth 3 (three) times a day as needed for anxiety or sleep    Insomnia, unspecified type  -     temazepam (RESTORIL) 30 mg capsule; Take 1 capsule (30 mg total) by mouth daily at bedtime    Mixed hyperlipidemia  -     atorvastatin (LIPITOR) 40 mg tablet; Take 1 tablet (40 mg total) by mouth daily    Other orders  -     diphenoxylate-atropine (LOMOTIL) 2 5-0 025 mg per tablet; Take 1 tablet by mouth 4 (four) times a day as needed        Subjective:      Patient ID: Surya Thao is a 64 y o  female  Patient presents to [address] [place] [address] as follow up  Allergies, medical history and current medications reviewed with patient; patient reports taking all medications as prescribed without issues  Patient reports she had an acute episode of Pancreatitis, and is now scheduled for CT of abdomen and chest at her next appointment with Oncology Dr Britney Rod 11/1/19  Patient states she feels good today, but does have good days and bad days  Patient states she has noticed improved of nasal irrigation  Patient denies any additional concerns      Patient Care Team:  [facility] as PCP - General (Family Medicine)  Karthikeyan Chu MD (Oncology)  Kit Cortes MD (Radiation Oncology)    Review of Systems   Respiratory: Negative for shortness of breath  Cardiovascular: Negative for chest pain  All other systems reviewed and are negative  Objective:    /68   Pulse 91   Ht 5' 6" (1 676 m)   Wt 56 7 kg (125 lb)   SpO2 98%   BMI 20 18 kg/m²      Physical Exam   Constitutional: She is oriented to person, place, and time  She appears well-developed and well-nourished  No distress  HENT:   Head: Normocephalic and atraumatic  Eyes: Conjunctivae and lids are normal    Neck: Normal range of motion  No tracheal deviation present  Cardiovascular: Normal rate, regular rhythm, S1 normal, S2 normal and normal heart sounds  No murmur heard  Pulmonary/Chest: Effort normal and breath sounds normal  No respiratory distress  Abdominal: Normal appearance  She exhibits no distension  There is no guarding  Musculoskeletal: Normal range of motion  She exhibits no edema, tenderness or deformity  Neurological: She is alert and oriented to person, place, and time  Skin: Skin is warm, dry and intact  Psychiatric: She has a normal mood and affect  Her speech is normal    Nursing note and vitals reviewed

## 2019-11-25 DIAGNOSIS — F41.9 ANXIETY: ICD-10-CM

## 2019-11-25 RX ORDER — ALPRAZOLAM 2 MG/1
2 TABLET ORAL 3 TIMES DAILY PRN
Qty: 90 TABLET | Refills: 0 | Status: SHIPPED | OUTPATIENT
Start: 2019-11-25 | End: 2019-12-26 | Stop reason: SDUPTHER

## 2019-11-26 ENCOUNTER — OFFICE VISIT (OUTPATIENT)
Dept: FAMILY MEDICINE CLINIC | Facility: CLINIC | Age: 59
End: 2019-11-26
Payer: COMMERCIAL

## 2019-11-26 VITALS
TEMPERATURE: 97.8 F | OXYGEN SATURATION: 97 % | BODY MASS INDEX: 20.73 KG/M2 | SYSTOLIC BLOOD PRESSURE: 108 MMHG | WEIGHT: 129 LBS | DIASTOLIC BLOOD PRESSURE: 72 MMHG | HEIGHT: 66 IN | RESPIRATION RATE: 18 BRPM | HEART RATE: 77 BPM

## 2019-11-26 DIAGNOSIS — K86.1 OTHER CHRONIC PANCREATITIS (HCC): ICD-10-CM

## 2019-11-26 DIAGNOSIS — C34.91 ADENOSQUAMOUS CARCINOMA OF RIGHT LUNG (HCC): ICD-10-CM

## 2019-11-26 DIAGNOSIS — C34.91 NON-SMALL CELL CANCER OF RIGHT LUNG (HCC): ICD-10-CM

## 2019-11-26 DIAGNOSIS — G47.00 INSOMNIA, UNSPECIFIED TYPE: ICD-10-CM

## 2019-11-26 DIAGNOSIS — R05.9 COUGH: ICD-10-CM

## 2019-11-26 DIAGNOSIS — J00 ACUTE NASOPHARYNGITIS: Primary | ICD-10-CM

## 2019-11-26 PROCEDURE — 1036F TOBACCO NON-USER: CPT | Performed by: NURSE PRACTITIONER

## 2019-11-26 PROCEDURE — 99213 OFFICE O/P EST LOW 20 MIN: CPT | Performed by: NURSE PRACTITIONER

## 2019-11-26 RX ORDER — HYDROCODONE POLISTIREX AND CHLORPHENIRAMINE POLISTIREX 10; 8 MG/5ML; MG/5ML
5 SUSPENSION, EXTENDED RELEASE ORAL EVERY 12 HOURS PRN
Qty: 120 ML | Refills: 0 | Status: SHIPPED | OUTPATIENT
Start: 2019-11-26 | End: 2020-06-09 | Stop reason: SDUPTHER

## 2019-11-26 RX ORDER — TEMAZEPAM 30 MG/1
30 CAPSULE ORAL
Qty: 30 CAPSULE | Refills: 0 | Status: SHIPPED | OUTPATIENT
Start: 2019-11-26 | End: 2019-12-26 | Stop reason: SDUPTHER

## 2019-11-26 RX ORDER — OXYCODONE HYDROCHLORIDE 5 MG/1
5 TABLET ORAL EVERY 4 HOURS PRN
Qty: 180 TABLET | Refills: 0 | Status: SHIPPED | OUTPATIENT
Start: 2019-11-26 | End: 2019-12-26 | Stop reason: SDUPTHER

## 2019-11-26 NOTE — PROGRESS NOTES
Assessment/Plan:     Diagnoses and all orders for this visit:    Acute nasopharyngitis  Comments:  Continue OTC Coricidin and Vicks, and humidify air at bedtime    Cough  -     hydrocodone-chlorpheniramine polistirex (TUSSIONEX) 10-8 mg/5 mL ER suspension; Take 5 mL by mouth every 12 (twelve) hours as needed for coughMax Daily Amount: 10 mL    [facility]  Comments: Follow up with specialists, as needed    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[facility] [address] )  -     oxyCODONE (ROXICODONE) 5 mg immediate release tablet; Take 1 tablet (5 mg total) by mouth every 4 (four) hours as needed for moderate pain or severe painMax Daily Amount: 30 mg  -     hydrocodone-chlorpheniramine polistirex (TUSSIONEX) 10-8 mg/5 mL ER suspension; Take 5 mL by mouth every 12 (twelve) hours as needed for coughMax Daily Amount: 10 mL    Non-small cell cancer of right lung (HCC)  -     oxyCODONE (ROXICODONE) 5 mg immediate release tablet; Take 1 tablet (5 mg total) by mouth every 4 (four) hours as needed for moderate pain or severe painMax Daily Amount: 30 mg  -     hydrocodone-chlorpheniramine polistirex (TUSSIONEX) 10-8 mg/5 mL ER suspension; Take 5 mL by mouth every 12 (twelve) hours as needed for coughMax Daily Amount: 10 mL    Insomnia, unspecified type  -     temazepam (RESTORIL) 30 mg capsule; Take 1 capsule (30 mg total) by mouth daily at bedtime        Subjective:      Patient ID: Harry Child is a 64 y o  female  Patient presents to [address] [place] [address] as follow up  Allergies, medical history and current medications reviewed with patient; patient reports taking all medications as prescribed without issues  Patient C/O a sore throat, PND, generalized body aches, and a cough that is moist but non-productive, ongoing for about 1 5 weeks  Patient states she used OTC Coricidin, which is somewhat effective   Patient reports she had discussed her symptoms with Oncology Dr Alida Marie, who had instructed her to manage symptoms with Coricidin and OTC Vicks; patient states she is supposed to call them if she develops a temperature above 100 5  Patient also reports she has been experiencing pancreatic pain; patient previously followed with Pancreatic specialist, but has not seen them since starting Chemotherapy treatment  Patient states she will call for follow up, when needed  Patient Care Team:  Roxi Ahuja as PCP - General (Family Medicine)  Dheeraj Balderrama MD (Oncology)  Loren Storey MD (Radiation Oncology)    Review of Systems   HENT: Positive for postnasal drip and sore throat  Respiratory: Positive for cough and shortness of breath  Musculoskeletal: Positive for arthralgias  All other systems reviewed and are negative  Objective:    /72 (BP Location: Left arm, Patient Position: Sitting, Cuff Size: Large)   Pulse 77   Temp 97 8 °F (36 6 °C) (Temporal)   Resp 18   Ht 5' 6" (1 676 m)   Wt 58 5 kg (129 lb)   SpO2 97%   BMI 20 82 kg/m²      Physical Exam   Constitutional: She is oriented to person, place, and time  She appears well-developed and well-nourished  No distress  HENT:   Head: Normocephalic and atraumatic  Eyes: Conjunctivae and lids are normal    Neck: Normal range of motion  No tracheal deviation present  Cardiovascular: Normal rate, regular rhythm, S1 normal, S2 normal and normal heart sounds  No murmur heard  Pulmonary/Chest: Effort normal  No respiratory distress  She has decreased breath sounds  She has no wheezes  She has no rhonchi  She has no rales  Abdominal: Normal appearance  She exhibits no distension  There is no guarding  Musculoskeletal: Normal range of motion  She exhibits no edema, tenderness or deformity  Neurological: She is alert and oriented to person, place, and time  Skin: Skin is warm, dry and intact  Psychiatric: She has a normal mood and affect  Her speech is normal    Nursing note and vitals reviewed

## 2019-11-26 NOTE — PATIENT INSTRUCTIONS
Cold Symptoms, Ambulatory Care   GENERAL INFORMATION:   Cold symptoms  include sneezing, dry throat, a stuffy nose, headache, watery eyes, and a cough  Your cough may be dry, or you may cough up mucus  You may also have muscle aches, joint pain, and tiredness  Rarely, you may have a fever  Cold symptoms occur from inflammation in your upper respiratory system caused by a virus  Most colds go away without treatment  Seek immediate care for the following symptoms:   · A heartbeat that is much faster than usual for you     · A swollen neck that is sore to the touch     · Increased tiredness and weakness    · Pinpoint or larger reddish-purple dots on your skin     · Poor or no appetite  Treatment for cold symptoms  may include NSAIDS to decrease muscle aches and fever  Do not give NSAID medicines to children under 10months of age without direction from your child's doctor  Cold medicines may also be given to decrease coughing, nasal stuffiness, sneezing, and a runny nose  Do not give cold medicines to children under 11years of age without direction from your child's doctor  Manage your cold symptoms with the following:   · Drink liquids  to help thin and loosen thick mucus so you can cough it up  Liquids will also keep you hydrated  Ask your healthcare provider which liquids are best for you and how much to drink each day  · Do not smoke  because it may worsen your symptoms and increase the length of time you feel sick  Talk with your healthcare provider if you need help to stop smoking  Prevent the spread of germs  by washing your hands often  You can spread your cold germs to others for at least 3 days after your symptoms start  Do not share items, such as eating utensils  Cover your nose and mouth when you cough or sneeze using the crook of your elbow instead of your hands  Throw used tissues in the garbage    Follow up with your healthcare provider as directed:  Write down your questions so you remember to ask them during your visits  CARE AGREEMENT:   You have the right to help plan your care  Learn about your health condition and how it may be treated  Discuss treatment options with your caregivers to decide what care you want to receive  You always have the right to refuse treatment  The above information is an  only  It is not intended as medical advice for individual conditions or treatments  Talk to your doctor, nurse or pharmacist before following any medical regimen to see if it is safe and effective for you  © 2014 [address] is for End User's use only and may not be sold, redistributed or otherwise used for commercial purposes  All illustrations and images included in CareNotes® are the copyrighted property of A D [employer] M [employer]  or [employer]sor[employer]

## 2019-12-24 ENCOUNTER — TELEPHONE (OUTPATIENT)
Dept: FAMILY MEDICINE CLINIC | Facility: CLINIC | Age: 59
End: 2019-12-24

## 2019-12-24 NOTE — TELEPHONE ENCOUNTER
Patient called said she had called her oncology dr who gave her an antibiotic she had a sore throat and white spots   She called yesterday and said she still has the white spots, she has an appointment with you on the 26th, I told her we could call her something in if needed to get through the holiday but she wanted to see you first

## 2019-12-26 ENCOUNTER — OFFICE VISIT (OUTPATIENT)
Dept: FAMILY MEDICINE CLINIC | Facility: CLINIC | Age: 59
End: 2019-12-26
Payer: COMMERCIAL

## 2019-12-26 VITALS
WEIGHT: 130 LBS | SYSTOLIC BLOOD PRESSURE: 140 MMHG | HEIGHT: 66 IN | BODY MASS INDEX: 20.89 KG/M2 | OXYGEN SATURATION: 98 % | HEART RATE: 88 BPM | TEMPERATURE: 98.3 F | DIASTOLIC BLOOD PRESSURE: 82 MMHG

## 2019-12-26 DIAGNOSIS — B37.0 ORAL CANDIDIASIS: Primary | ICD-10-CM

## 2019-12-26 DIAGNOSIS — C34.91 ADENOSQUAMOUS CARCINOMA OF RIGHT LUNG (HCC): ICD-10-CM

## 2019-12-26 DIAGNOSIS — F41.9 ANXIETY: ICD-10-CM

## 2019-12-26 DIAGNOSIS — H60.393 OTHER INFECTIVE ACUTE OTITIS EXTERNA OF BOTH EARS: ICD-10-CM

## 2019-12-26 DIAGNOSIS — C34.91 NON-SMALL CELL CANCER OF RIGHT LUNG (HCC): ICD-10-CM

## 2019-12-26 DIAGNOSIS — G47.00 INSOMNIA, UNSPECIFIED TYPE: ICD-10-CM

## 2019-12-26 PROCEDURE — 99213 OFFICE O/P EST LOW 20 MIN: CPT | Performed by: NURSE PRACTITIONER

## 2019-12-26 PROCEDURE — 3008F BODY MASS INDEX DOCD: CPT | Performed by: NURSE PRACTITIONER

## 2019-12-26 PROCEDURE — 1036F TOBACCO NON-USER: CPT | Performed by: NURSE PRACTITIONER

## 2019-12-26 RX ORDER — TEMAZEPAM 30 MG/1
30 CAPSULE ORAL
Qty: 30 CAPSULE | Refills: 0 | Status: SHIPPED | OUTPATIENT
Start: 2019-12-26 | End: 2020-01-21 | Stop reason: SDUPTHER

## 2019-12-26 RX ORDER — ALPRAZOLAM 2 MG/1
2 TABLET ORAL 3 TIMES DAILY PRN
Qty: 90 TABLET | Refills: 0 | Status: SHIPPED | OUTPATIENT
Start: 2019-12-26 | End: 2020-01-21 | Stop reason: SDUPTHER

## 2019-12-26 RX ORDER — OXYCODONE HYDROCHLORIDE 5 MG/1
5 TABLET ORAL EVERY 4 HOURS PRN
Qty: 180 TABLET | Refills: 0 | Status: SHIPPED | OUTPATIENT
Start: 2019-12-26 | End: 2020-01-21 | Stop reason: SDUPTHER

## 2019-12-26 NOTE — PATIENT INSTRUCTIONS
Oral Candidiasis   WHAT YOU NEED TO KNOW:   What is oral candidiasis? Oral candidiasis, or thrush, is a fungal infection that affects the inside of your mouth  What causes oral candidiasis? Oral candidiasis is caused by a type of fungus called Candida  Fungi are normally found in your mouth  When there are too many fungi, it can cause an infection  Babies and the elderly are at higher risk because their immune systems are not as strong   babies may get thrush if the mother had vaginal candidiasis during delivery  The following may increase your risk of oral candidiasis:  · Medical conditions that suppress your immune system, such as diabetes, cancer, or HIV and AIDS    · Medicines, such as antibiotics, steroids, or chemotherapy    · Radiation therapy to the head and neck    · Dry mouth    · Smoking    · Dentures  What are the signs and symptoms of oral candidiasis? · White or whitish-yellow patches in the mouth that look like milk curds    · Redness or bleeding under the patches    · Sore and painful mouth, with cracking or tearing on the corners    · Bright red tongue that may feel like it is burning    · Trouble swallowing and tasting    · Swelling under dentures  How is oral candidiasis diagnosed? Your healthcare provider will ask about your medical history  He will ask when your signs and symptoms started  He will examine the inside of your mouth and the area around your mouth  Your healthcare provider will also rub a cotton swab on one of the patches and check it under a microscope  How is oral candidiasis treated? Antifungal medicine helps kill the fungus that caused your oral candidiasis  This medicine may be a pill or a solution that you gargle  Remove dentures before you gargle  How can I help to prevent oral candidiasis? Brush your teeth, gums, and tongue after you eat and before you go to sleep  Use a toothbrush with soft bristles  See your dentist for regular exams   Remove your dentures when you sleep, or at least 6 hours each day  Clean your dentures and soak them in denture   Let them air dry after soaking  When should I seek immediate care? · You have trouble swallowing and your jaw and neck are stiff  · You are dizzy, thirsty, or have a dry mouth  · You are urinating little or not at all  · You cannot eat or drink because of the pain  When should I contact my healthcare provider? · You have a fever  · You have nausea, vomiting, or diarrhea  · Your signs and symptoms get worse, even after treatment  · You have questions or concerns about your condition or care  CARE AGREEMENT:   You have the right to help plan your care  Learn about your health condition and how it may be treated  Discuss treatment options with your caregivers to decide what care you want to receive  You always have the right to refuse treatment  The above information is an  only  It is not intended as medical advice for individual conditions or treatments  Talk to your doctor, nurse or pharmacist before following any medical regimen to see if it is safe and effective for you  © 2017 [address]  Information is for End User's use only and may not be sold, redistributed or otherwise used for commercial purposes  All illustrations and images included in CareNotes® are the copyrighted property of [employer]  or [employer]sorio

## 2019-12-26 NOTE — PROGRESS NOTES
Assessment/Plan:     Diagnoses and all orders for this visit:    Oral candidiasis  -     nystatin (MYCOSTATIN) 500,000 units/5 mL suspension; Swish 5 mL around mouth and swallow 4 times daily for 14 days  Other infective acute otitis externa of both ears  -     neomycin-polymyxin-hydrocortisone (CORTISPORIN) otic solution; Administer 4 drops into both ears every 6 (six) hours for 7 days    Anxiety  -     ALPRAZolam (XANAX) 2 MG tablet; Take 1 tablet (2 mg total) by mouth 3 (three) times a day as needed for anxiety or sleep    Insomnia, unspecified type  -     temazepam (RESTORIL) 30 mg capsule; Take 1 capsule (30 mg total) by mouth daily at bedtime    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HCC)  -     oxyCODONE (ROXICODONE) 5 mg immediate release tablet; Take 1 tablet (5 mg total) by mouth every 4 (four) hours as needed for moderate pain or severe painMax Daily Amount: 30 mg    Non-small cell cancer of right lung (HCC)  -     oxyCODONE (ROXICODONE) 5 mg immediate release tablet; Take 1 tablet (5 mg total) by mouth every 4 (four) hours as needed for moderate pain or severe painMax Daily Amount: 30 mg        Subjective:      Patient ID: Kemar Alexandra is a 64 y o  female  Patient presents to [address] [place] [address] as follow up  Allergies, medical history and current medications reviewed with patient; patient reports taking all medications as prescribed without issues  Patient C/O a sore throat and a non-productive cough, ongoing for a few weeks  Patient was prescribed 10-day course of Augmentin by Oncology Dr Randall Novoa, which she reports she did complete; however, patient states she did not notice any improvement of symptoms  Patient reports she has also been experiencing dry nares and throat, and has had difficulty using her Oxygen as a result       Patient Care Team:  Edwin Holly as PCP - General (Family Medicine)  Lisha Vazquez MD (Oncology)  Gary Jules MD (Radiation Oncology)    Review of Systems   HENT: Positive for sore throat  Respiratory: Positive for cough and shortness of breath  All other systems reviewed and are negative  Objective:    /82 (BP Location: Left arm, Patient Position: Sitting, Cuff Size: Adult)   Pulse 88   Temp 98 3 °F (36 8 °C) (Temporal)   Ht 5' 6" (1 676 m)   Wt 59 kg (130 lb)   SpO2 98%   BMI 20 98 kg/m²      Physical Exam   Constitutional: She is oriented to person, place, and time  She appears well-developed and well-nourished  No distress  HENT:   Head: Normocephalic and atraumatic  Right Ear: External ear normal  There is swelling (with erythema)  Tympanic membrane is injected  Left Ear: External ear normal  There is swelling (with erythema)  Tympanic membrane is injected  Nose: Nose normal  No mucosal edema  Mouth/Throat: Uvula is midline, oropharynx is clear and moist and mucous membranes are normal  Oral lesions (thrush noted to posterior tongue) present  Nasal turbinates pale, moist and without exudate  Eyes: Conjunctivae and lids are normal    Neck: Normal range of motion  No tracheal deviation present  Cardiovascular: Normal rate, regular rhythm, S1 normal, S2 normal and normal heart sounds  No murmur heard  Pulmonary/Chest: Effort normal  No respiratory distress  She has decreased breath sounds in the right upper field and the left upper field  She has no wheezes  She has no rhonchi  She has no rales  Abdominal: Normal appearance  She exhibits no distension  There is no guarding  Musculoskeletal: Normal range of motion  Neurological: She is alert and oriented to person, place, and time  Skin: Skin is warm, dry and intact  Psychiatric: She has a normal mood and affect  Her speech is normal    Nursing note and vitals reviewed

## 2020-01-21 ENCOUNTER — OFFICE VISIT (OUTPATIENT)
Dept: FAMILY MEDICINE CLINIC | Facility: CLINIC | Age: 60
End: 2020-01-21
Payer: COMMERCIAL

## 2020-01-21 VITALS
DIASTOLIC BLOOD PRESSURE: 80 MMHG | WEIGHT: 130.4 LBS | BODY MASS INDEX: 20.96 KG/M2 | OXYGEN SATURATION: 96 % | RESPIRATION RATE: 18 BRPM | HEIGHT: 66 IN | SYSTOLIC BLOOD PRESSURE: 120 MMHG | HEART RATE: 88 BPM | TEMPERATURE: 97.7 F

## 2020-01-21 DIAGNOSIS — C34.91 NON-SMALL CELL CANCER OF RIGHT LUNG (HCC): ICD-10-CM

## 2020-01-21 DIAGNOSIS — G47.00 INSOMNIA, UNSPECIFIED TYPE: ICD-10-CM

## 2020-01-21 DIAGNOSIS — Z72.0 SMOKING TRYING TO QUIT: ICD-10-CM

## 2020-01-21 DIAGNOSIS — F41.8 DEPRESSION WITH ANXIETY: ICD-10-CM

## 2020-01-21 DIAGNOSIS — Z51.81 THERAPEUTIC DRUG MONITORING: ICD-10-CM

## 2020-01-21 DIAGNOSIS — C34.91 ADENOSQUAMOUS CARCINOMA OF RIGHT LUNG (HCC): ICD-10-CM

## 2020-01-21 DIAGNOSIS — Z79.899 OTHER LONG TERM (CURRENT) DRUG THERAPY: ICD-10-CM

## 2020-01-21 DIAGNOSIS — J02.9 SORE THROAT: Primary | ICD-10-CM

## 2020-01-21 DIAGNOSIS — F41.9 ANXIETY: ICD-10-CM

## 2020-01-21 DIAGNOSIS — E78.2 MIXED HYPERLIPIDEMIA: ICD-10-CM

## 2020-01-21 PROCEDURE — 3008F BODY MASS INDEX DOCD: CPT | Performed by: NURSE PRACTITIONER

## 2020-01-21 PROCEDURE — 1036F TOBACCO NON-USER: CPT | Performed by: NURSE PRACTITIONER

## 2020-01-21 PROCEDURE — 80365 DRUG SCREENING OXYCODONE: CPT | Performed by: NURSE PRACTITIONER

## 2020-01-21 PROCEDURE — 99213 OFFICE O/P EST LOW 20 MIN: CPT | Performed by: NURSE PRACTITIONER

## 2020-01-21 PROCEDURE — 3725F SCREEN DEPRESSION PERFORMED: CPT | Performed by: NURSE PRACTITIONER

## 2020-01-21 PROCEDURE — 80307 DRUG TEST PRSMV CHEM ANLYZR: CPT | Performed by: NURSE PRACTITIONER

## 2020-01-21 RX ORDER — ATORVASTATIN CALCIUM 40 MG/1
40 TABLET, FILM COATED ORAL DAILY
Qty: 100 TABLET | Refills: 3 | Status: SHIPPED | OUTPATIENT
Start: 2020-01-21 | End: 2020-07-02 | Stop reason: SDUPTHER

## 2020-01-21 RX ORDER — TEMAZEPAM 30 MG/1
30 CAPSULE ORAL
Qty: 30 CAPSULE | Refills: 0 | Status: SHIPPED | OUTPATIENT
Start: 2020-01-21 | End: 2020-02-19 | Stop reason: SDUPTHER

## 2020-01-21 RX ORDER — ALPRAZOLAM 2 MG/1
2 TABLET ORAL 3 TIMES DAILY PRN
Qty: 90 TABLET | Refills: 0 | Status: SHIPPED | OUTPATIENT
Start: 2020-01-21 | End: 2020-02-19 | Stop reason: SDUPTHER

## 2020-01-21 RX ORDER — OXYCODONE HYDROCHLORIDE 5 MG/1
5 TABLET ORAL EVERY 4 HOURS PRN
Qty: 180 TABLET | Refills: 0 | Status: SHIPPED | OUTPATIENT
Start: 2020-01-21 | End: 2020-01-28 | Stop reason: SDUPTHER

## 2020-01-21 RX ORDER — SERTRALINE HYDROCHLORIDE 100 MG/1
150 TABLET, FILM COATED ORAL
Qty: 150 TABLET | Refills: 3 | Status: SHIPPED | OUTPATIENT
Start: 2020-01-21 | End: 2020-07-02 | Stop reason: SDUPTHER

## 2020-01-21 NOTE — PROGRESS NOTES
Assessment/Plan:     Diagnoses and all orders for this visit:    Sore throat  -     al mag oxide-diphenhydramine-lidocaine viscous (MAGIC MOUTHWASH) 1:1:1 suspension; Swish and swallow 10 mL every 6 (six) hours as needed for mouth pain or discomfort    Smoking trying to quit  -     nicotine (NICODERM CQ) 7 mg/24hr TD 24 hr patch; Place 1 patch on the skin every 24 hours    Mixed hyperlipidemia  -     atorvastatin (LIPITOR) 40 mg tablet; Take 1 tablet (40 mg total) by mouth daily    Depression with anxiety  -     sertraline (ZOLOFT) 100 mg tablet; Take 1 5 tablets (150 mg total) by mouth daily at bedtime    Anxiety  -     ALPRAZolam (XANAX) 2 MG tablet; Take 1 tablet (2 mg total) by mouth 3 (three) times a day as needed for anxiety or sleep    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HCC)  -     oxyCODONE (ROXICODONE) 5 mg immediate release tablet; Take 1 tablet (5 mg total) by mouth every 4 (four) hours as needed for moderate pain or severe painMax Daily Amount: 30 mg    Non-small cell cancer of right lung (HCC)  -     oxyCODONE (ROXICODONE) 5 mg immediate release tablet; Take 1 tablet (5 mg total) by mouth every 4 (four) hours as needed for moderate pain or severe painMax Daily Amount: 30 mg    Insomnia, unspecified type  -     temazepam (RESTORIL) 30 mg capsule; Take 1 capsule (30 mg total) by mouth daily at bedtime    Therapeutic drug monitoring  -     Toxicology screen, urine  -     Oxycodone/Oxymorphone urine    Other long term (current) drug therapy  -     Toxicology screen, urine  -     Oxycodone/Oxymorphone urine        Subjective:      Patient ID: Kemal Capone is a 64 y o  female  Patient presents to [address] [place] [address] as follow up  Allergies, medical history and current medications reviewed with patient; patient reports taking all medications as prescribed without issues  Patient continues to C/O a sore throat, ongoing for about 3 weeks   Patient was previously prescribed Nystatin, and was later prescribed course of Diflucan by Oncology Dr Kim Bogres; patient states symptoms were initially resolved, but that symptoms returned a few days after she completed 2 week course of Diflucan  Patient also reports feeling fatigued associated with Immunotherapy  Patient also reports she has been using OTC Nicoderm 7 mg patches at home daily to try to quit smoking  Patient is requesting prescription for patches  Patient Care Team:  Maryuri Duong as PCP - General (Family Medicine)  Meredith Victoria DO as PCP - [address] [place], [place] [id number] [facility] ([facility])  Carly Morgan MD (Oncology)  Jenna Ruggiero MD (Radiation Oncology)    Review of Systems   Constitutional: Positive for fatigue  HENT: Positive for sore throat  All other systems reviewed and are negative  Objective:    /80 (BP Location: Left arm, Patient Position: Sitting, Cuff Size: Large)   Pulse 88   Temp 97 7 °F (36 5 °C) (Temporal)   Resp 18   Ht 5' 6" (1 676 m)   Wt 59 1 kg (130 lb 6 4 oz)   SpO2 96%   BMI 21 05 kg/m²      Physical Exam   Constitutional: She is oriented to person, place, and time  She appears well-developed and well-nourished  No distress  HENT:   Head: Normocephalic and atraumatic  Mouth/Throat: Uvula is midline and mucous membranes are normal  No oral lesions (no thrush noted)  Posterior oropharyngeal erythema present  Tonsils are 1+ on the right  Tonsils are 1+ on the left  Eyes: Conjunctivae and lids are normal    Neck: Normal range of motion  No tracheal deviation present  Cardiovascular: Normal rate, regular rhythm, S1 normal, S2 normal and normal heart sounds  No murmur heard  Pulmonary/Chest: Effort normal and breath sounds normal  No respiratory distress  Abdominal: Normal appearance  She exhibits no distension  There is no guarding  Musculoskeletal: Normal range of motion  Neurological: She is alert and oriented to person, place, and time  Skin: Skin is warm, dry and intact  Psychiatric: She has a normal mood and affect  Her speech is normal    Nursing note and vitals reviewed

## 2020-01-21 NOTE — PATIENT INSTRUCTIONS
Sore Throat, Ambulatory Care   GENERAL INFORMATION:   A sore throat  is often caused by a cold or flu virus  A sore throat may also be caused by bacteria such as strep  Other causes include smoking, a runny nose, allergies, or acid reflux  Seek immediate care for the following symptoms:   · Trouble breathing or swallowing because your throat is swollen or sore    · Drooling because it hurts too much to swallow    · A painful lump in your throat that does not go away after 5 days    · A fever higher than 102? F (39?C) or lasts longer than 3 days    · Confusion    · Blood in your throat or ear  Treatment for a sore throat  will depend on the cause how severe it is  A sore throat cause by a virus will go away on its own without treatment  You will need antibiotics if your sore throat is caused by bacteria  Your sore throat should start to feel better within 3 to 5 days for both viral and bacterial infections  Care for your sore throat:   · Gargle with salt water  Mix ¼ teaspoon salt in a glass of warm water and gargle  This may help reduce swelling in your throat  · Take ibuprofen or acetaminophen:  These medicines decrease pain and fever  They are available without a doctor's order  Ask your healthcare provider which medicine is best for you  Ask how much to take and how often to take it  · Drink more liquids  Cold or warm drinks may help soothe your sore throat  Drinking liquids can also help prevent dehydration  · Use a cool-steam humidifier  to help moisten the air in your room and reduce your throat pain  · Use lozenges, ice, soft foods, or popsicles  to soothe your throat  · Rest your throat as much as possible  Try not to use your voice  This may irritate your throat and worsen your symptoms  Follow up with your healthcare provider as directed:  Write down your questions so you remember to ask them during your visits  CARE AGREEMENT:   You have the right to help plan your care   Learn about your health condition and how it may be treated  Discuss treatment options with your caregivers to decide what care you want to receive  You always have the right to refuse treatment  The above information is an  only  It is not intended as medical advice for individual conditions or treatments  Talk to your doctor, nurse or pharmacist before following any medical regimen to see if it is safe and effective for you  © 2014 [address] is for End User's use only and may not be sold, redistributed or otherwise used for commercial purposes  All illustrations and images included in CareNotes® are the copyrighted property of [employer] D [employer] M [employer]  or [employer]  Wellness Visit for Adults   WHAT YOU NEED TO KNOW:   What is a wellness visit? A wellness visit is when you see your healthcare provider to get screened for health problems  You can also get advice on how to stay healthy  Write down your questions so you remember to ask them  Ask your healthcare provider how often you should have a wellness visit  What happens at a wellness visit? Your healthcare provider will ask about your health, and your family history of health problems  This includes high blood pressure, heart disease, and cancer  He or she will ask if you have symptoms that concern you, if you smoke, and about your mood  You may also be asked about your intake of medicines, supplements, food, and alcohol  Any of the following may be done:  · Your weight  will be checked  Your height may also be checked so your body mass index (BMI) can be calculated  Your BMI shows if you are at a healthy weight  · Your blood pressure  and heart rate will be checked  Your temperature may also be checked  · Blood and urine tests  may be done  Blood tests may be done to check your cholesterol levels  Abnormal cholesterol levels increase your risk for heart disease and stroke   You may also need a blood or urine test to check for diabetes if you are at increased risk  Urine tests may be done to look for signs of an infection or kidney disease  · A physical exam  includes checking your heartbeat and lungs with a stethoscope  Your healthcare provider may also check your skin to look for sun damage  · Screening tests  may be recommended  A screening test is done to check for diseases that may not cause symptoms  The screening tests you may need depend on your age, gender, family history, and lifestyle habits  For example, colorectal screening may be recommended if you are 48years old or older  What screening tests do I need if I am a woman? · A Pap smear  is used to screen for cervical cancer  Pap smears are usually done every 3 to 5 years depending on your age  You may need them more often if you have had abnormal Pap smear test results in the past  Ask your healthcare provider how often you should have a Pap smear  · A mammogram  is an x-ray of your breasts to screen for breast cancer  Experts recommend mammograms every 2 years starting at age 48 years  You may need a mammogram at age 52 years or younger if you have an increased risk for breast cancer  Talk to your healthcare provider about when you should start having mammograms and how often you need them  What vaccines might I need? · Get an influenza vaccine  every year  The influenza vaccine protects you from the flu  Several types of viruses cause the flu  The viruses change over time, so new vaccines are made each year  · Get a tetanus-diphtheria (Td) booster vaccine  every 10 years  This vaccine protects you against tetanus and diphtheria  Tetanus is a severe infection that may cause painful muscle spasms and lockjaw  Diphtheria is a severe bacterial infection that causes a thick covering in the back of your mouth and throat       · Get a human papillomavirus (HPV) vaccine  if you are female and aged 23 to 32 or male 23 to 24 and never received it  This vaccine protects you from HPV infection  HPV is the most common infection spread by sexual contact  HPV may also cause vaginal, penile, and anal cancers  · Get a pneumococcal vaccine  if you are aged 72 years or older  The pneumococcal vaccine is an injection given to protect you from pneumococcal disease  Pneumococcal disease is an infection caused by pneumococcal bacteria  The infection may cause pneumonia, meningitis, or an ear infection  · Get a shingles vaccine  if you are aged 61 or older, even if you have had shingles before  The shingles vaccine is an injection to protect you from the varicella-zoster virus  This is the same virus that causes chickenpox  Shingles is a painful rash that develops in people who had chickenpox or have been exposed to the virus  How can I eat healthy? My Plate is a model for planning healthy meals  It shows the types and amounts of foods that should go on your plate  Fruits and vegetables make up about half of your plate, and grains and protein make up the other half  A serving of dairy is included on the side of your plate  The amount of calories and serving sizes you need depends on your age, gender, weight, and height  Examples of healthy foods are listed below:  · Eat a variety of vegetables  such as dark green, red, and orange vegetables  You can also include canned vegetables low in sodium (salt) and frozen vegetables without added butter or sauces  · Eat a variety of fresh fruits , canned fruit in 100% juice, frozen fruit, and dried fruit  · Include whole grains  At least half of the grains you eat should be whole grains  Examples include whole-wheat bread, wheat pasta, brown rice, and whole-grain cereals such as oatmeal     · Eat a variety of protein foods such as seafood (fish and shellfish), lean meat, and poultry without skin (turkey and chicken)   Examples of lean meats include pork leg, shoulder, or tenderloin, and beef round, sirloin, tenderloin, and extra lean ground beef  Other protein foods include eggs and egg substitutes, beans, peas, soy products, nuts, and seeds  · Choose low-fat dairy products such as skim or 1% milk or low-fat yogurt, cheese, and cottage cheese  · Limit unhealthy fats  such as butter, hard margarine, and shortening  How much exercise do I need? Exercise at least 30 minutes per day on most days of the week  Some examples of exercise include walking, biking, dancing, and swimming  You can also fit in more physical activity by taking the stairs instead of the elevator or parking farther away from stores  Include muscle strengthening activities 2 days each week  Regular exercise provides many health benefits  It helps you manage your weight, and decreases your risk for type 2 diabetes, heart disease, stroke, and high blood pressure  Exercise can also help improve your mood  Ask your healthcare provider about the best exercise plan for you  What are some general health and safety guidelines I should follow? · Do not smoke  Nicotine and other chemicals in cigarettes and cigars can cause lung damage  Ask your healthcare provider for information if you currently smoke and need help to quit  E-cigarettes or smokeless tobacco still contain nicotine  Talk to your healthcare provider before you use these products  · Limit alcohol  A drink of alcohol is 12 ounces of beer, 5 ounces of wine, or 1½ ounces of liquor  · Lose weight, if needed  Being overweight increases your risk of certain health conditions  These include heart disease, high blood pressure, type 2 diabetes, and certain types of cancer  · Protect your skin  Do not sunbathe or use tanning beds  Use sunscreen with a SPF 15 or higher  Apply sunscreen at least 15 minutes before you go outside  Reapply sunscreen every 2 hours  Wear protective clothing, hats, and sunglasses when you are outside  · Drive safely  Always wear your seatbelt  Make sure everyone in your car wears a seatbelt  A seatbelt can save your life if you are in an accident  Do not use your cell phone when you are driving  This could distract you and cause an accident  Pull over if you need to make a call or send a text message  · Practice safe sex  Use latex condoms if are sexually active and have more than one partner  Your healthcare provider may recommend screening tests for sexually transmitted infections (STIs)  · Wear helmets, lifejackets, and protective gear  Always wear a helmet when you ride a bike or motorcycle, go skiing, or play sports that could cause a head injury  Wear protective equipment when you play sports  Wear a lifejacket when you are on a boat or doing water sports  CARE AGREEMENT:   You have the right to help plan your care  Learn about your health condition and how it may be treated  Discuss treatment options with your caregivers to decide what care you want to receive  You always have the right to refuse treatment  The above information is an  only  It is not intended as medical advice for individual conditions or treatments  Talk to your doctor, nurse or pharmacist before following any medical regimen to see if it is safe and effective for you  © 2017 [address]  Information is for End User's use only and may not be sold, redistributed or otherwise used for commercial purposes  All illustrations and images included in CareNotes® are the copyrighted property of A D A M , Inc  or Arturo Osorio

## 2020-01-25 LAB
AMPHETAMINES UR QL SCN: NEGATIVE NG/ML
BARBITURATES UR QL SCN: NEGATIVE NG/ML
BENZODIAZ UR QL: POSITIVE
BZE UR QL: NEGATIVE NG/ML
CANNABINOIDS UR QL SCN: NEGATIVE NG/ML
METHADONE UR QL SCN: NEGATIVE NG/ML
OPIATES UR QL: NEGATIVE
PCP UR QL: NEGATIVE NG/ML
PROPOXYPH UR QL SCN: NEGATIVE NG/ML

## 2020-01-28 DIAGNOSIS — C34.91 ADENOSQUAMOUS CARCINOMA OF RIGHT LUNG (HCC): ICD-10-CM

## 2020-01-28 DIAGNOSIS — C34.91 NON-SMALL CELL CANCER OF RIGHT LUNG (HCC): ICD-10-CM

## 2020-01-28 RX ORDER — OXYCODONE HYDROCHLORIDE 5 MG/1
5 TABLET ORAL EVERY 4 HOURS PRN
Qty: 180 TABLET | Refills: 0 | Status: SHIPPED | OUTPATIENT
Start: 2020-01-28 | End: 2020-02-19 | Stop reason: SDUPTHER

## 2020-01-31 LAB
OXYCODONE UR QL CFM: 1305 NG/ML
OXYCODONE+OXYMORPHONE SERPLBLD QL SCN: POSITIVE
OXYCODONE+OXYMORPHONE UR QL SCN: NORMAL NG/ML
OXYCODONE+OXYMORPHONE UR QL SCN: POSITIVE
OXYMORPHONE UR CFM-MCNC: 847 NG/ML
OXYMORPHONE UR QL CFM: POSITIVE

## 2020-02-19 ENCOUNTER — OFFICE VISIT (OUTPATIENT)
Dept: FAMILY MEDICINE CLINIC | Facility: CLINIC | Age: 60
End: 2020-02-19
Payer: COMMERCIAL

## 2020-02-19 VITALS
OXYGEN SATURATION: 96 % | RESPIRATION RATE: 18 BRPM | WEIGHT: 127.4 LBS | BODY MASS INDEX: 20.48 KG/M2 | DIASTOLIC BLOOD PRESSURE: 80 MMHG | SYSTOLIC BLOOD PRESSURE: 124 MMHG | HEART RATE: 82 BPM | HEIGHT: 66 IN | TEMPERATURE: 98.6 F

## 2020-02-19 DIAGNOSIS — F41.9 ANXIETY: ICD-10-CM

## 2020-02-19 DIAGNOSIS — C34.91 ADENOSQUAMOUS CARCINOMA OF RIGHT LUNG (HCC): ICD-10-CM

## 2020-02-19 DIAGNOSIS — C34.91 NON-SMALL CELL CANCER OF RIGHT LUNG (HCC): ICD-10-CM

## 2020-02-19 DIAGNOSIS — G47.00 INSOMNIA, UNSPECIFIED TYPE: ICD-10-CM

## 2020-02-19 PROCEDURE — 1036F TOBACCO NON-USER: CPT | Performed by: NURSE PRACTITIONER

## 2020-02-19 PROCEDURE — 3008F BODY MASS INDEX DOCD: CPT | Performed by: NURSE PRACTITIONER

## 2020-02-19 PROCEDURE — 99213 OFFICE O/P EST LOW 20 MIN: CPT | Performed by: NURSE PRACTITIONER

## 2020-02-19 RX ORDER — ALPRAZOLAM 2 MG/1
2 TABLET ORAL 3 TIMES DAILY PRN
Qty: 90 TABLET | Refills: 0 | Status: SHIPPED | OUTPATIENT
Start: 2020-02-19 | End: 2020-03-18 | Stop reason: SDUPTHER

## 2020-02-19 RX ORDER — TEMAZEPAM 30 MG/1
30 CAPSULE ORAL
Qty: 30 CAPSULE | Refills: 0 | Status: SHIPPED | OUTPATIENT
Start: 2020-02-19 | End: 2020-03-18 | Stop reason: SDUPTHER

## 2020-02-19 RX ORDER — OXYCODONE HYDROCHLORIDE 5 MG/1
5 TABLET ORAL EVERY 4 HOURS PRN
Qty: 180 TABLET | Refills: 0 | Status: SHIPPED | OUTPATIENT
Start: 2020-02-19 | End: 2020-03-18 | Stop reason: SDUPTHER

## 2020-02-19 NOTE — PATIENT INSTRUCTIONS
Wellness Visit for Adults   WHAT YOU NEED TO KNOW:   What is a wellness visit? A wellness visit is when you see your healthcare provider to get screened for health problems  You can also get advice on how to stay healthy  Write down your questions so you remember to ask them  Ask your healthcare provider how often you should have a wellness visit  What happens at a wellness visit? Your healthcare provider will ask about your health, and your family history of health problems  This includes high blood pressure, heart disease, and cancer  He or she will ask if you have symptoms that concern you, if you smoke, and about your mood  You may also be asked about your intake of medicines, supplements, food, and alcohol  Any of the following may be done:  · Your weight  will be checked  Your height may also be checked so your body mass index (BMI) can be calculated  Your BMI shows if you are at a healthy weight  · Your blood pressure  and heart rate will be checked  Your temperature may also be checked  · Blood and urine tests  may be done  Blood tests may be done to check your cholesterol levels  Abnormal cholesterol levels increase your risk for heart disease and stroke  You may also need a blood or urine test to check for diabetes if you are at increased risk  Urine tests may be done to look for signs of an infection or kidney disease  · A physical exam  includes checking your heartbeat and lungs with a stethoscope  Your healthcare provider may also check your skin to look for sun damage  · Screening tests  may be recommended  A screening test is done to check for diseases that may not cause symptoms  The screening tests you may need depend on your age, gender, family history, and lifestyle habits  For example, colorectal screening may be recommended if you are 48years old or older  What screening tests do I need if I am a woman? · A Pap smear  is used to screen for cervical cancer   Pap smears are usually done every 3 to 5 years depending on your age  You may need them more often if you have had abnormal Pap smear test results in the past  Ask your healthcare provider how often you should have a Pap smear  · A mammogram  is an x-ray of your breasts to screen for breast cancer  Experts recommend mammograms every 2 years starting at age 48 years  You may need a mammogram at age 52 years or younger if you have an increased risk for breast cancer  Talk to your healthcare provider about when you should start having mammograms and how often you need them  What vaccines might I need? · Get an influenza vaccine  every year  The influenza vaccine protects you from the flu  Several types of viruses cause the flu  The viruses change over time, so new vaccines are made each year  · Get a tetanus-diphtheria (Td) booster vaccine  every 10 years  This vaccine protects you against tetanus and diphtheria  Tetanus is a severe infection that may cause painful muscle spasms and lockjaw  Diphtheria is a severe bacterial infection that causes a thick covering in the back of your mouth and throat  · Get a human papillomavirus (HPV) vaccine  if you are female and aged 23 to 32 or male 23 to 24 and never received it  This vaccine protects you from HPV infection  HPV is the most common infection spread by sexual contact  HPV may also cause vaginal, penile, and anal cancers  · Get a pneumococcal vaccine  if you are aged 72 years or older  The pneumococcal vaccine is an injection given to protect you from pneumococcal disease  Pneumococcal disease is an infection caused by pneumococcal bacteria  The infection may cause pneumonia, meningitis, or an ear infection  · Get a shingles vaccine  if you are aged 61 or older, even if you have had shingles before  The shingles vaccine is an injection to protect you from the varicella-zoster virus  This is the same virus that causes chickenpox   Shingles is a painful rash that develops in people who had chickenpox or have been exposed to the virus  How can I eat healthy? My Plate is a model for planning healthy meals  It shows the types and amounts of foods that should go on your plate  Fruits and vegetables make up about half of your plate, and grains and protein make up the other half  A serving of dairy is included on the side of your plate  The amount of calories and serving sizes you need depends on your age, gender, weight, and height  Examples of healthy foods are listed below:  · Eat a variety of vegetables  such as dark green, red, and orange vegetables  You can also include canned vegetables low in sodium (salt) and frozen vegetables without added butter or sauces  · Eat a variety of fresh fruits , canned fruit in 100% juice, frozen fruit, and dried fruit  · Include whole grains  At least half of the grains you eat should be whole grains  Examples include whole-wheat bread, wheat pasta, brown rice, and whole-grain cereals such as oatmeal     · Eat a variety of protein foods such as seafood (fish and shellfish), lean meat, and poultry without skin (turkey and chicken)  Examples of lean meats include pork leg, shoulder, or tenderloin, and beef round, sirloin, tenderloin, and extra lean ground beef  Other protein foods include eggs and egg substitutes, beans, peas, soy products, nuts, and seeds  · Choose low-fat dairy products such as skim or 1% milk or low-fat yogurt, cheese, and cottage cheese  · Limit unhealthy fats  such as butter, hard margarine, and shortening  How much exercise do I need? Exercise at least 30 minutes per day on most days of the week  Some examples of exercise include walking, biking, dancing, and swimming  You can also fit in more physical activity by taking the stairs instead of the elevator or parking farther away from stores  Include muscle strengthening activities 2 days each week  Regular exercise provides many health benefits  It helps you manage your weight, and decreases your risk for type 2 diabetes, heart disease, stroke, and high blood pressure  Exercise can also help improve your mood  Ask your healthcare provider about the best exercise plan for you  What are some general health and safety guidelines I should follow? · Do not smoke  Nicotine and other chemicals in cigarettes and cigars can cause lung damage  Ask your healthcare provider for information if you currently smoke and need help to quit  E-cigarettes or smokeless tobacco still contain nicotine  Talk to your healthcare provider before you use these products  · Limit alcohol  A drink of alcohol is 12 ounces of beer, 5 ounces of wine, or 1½ ounces of liquor  · Lose weight, if needed  Being overweight increases your risk of certain health conditions  These include heart disease, high blood pressure, type 2 diabetes, and certain types of cancer  · Protect your skin  Do not sunbathe or use tanning beds  Use sunscreen with a SPF 15 or higher  Apply sunscreen at least 15 minutes before you go outside  Reapply sunscreen every 2 hours  Wear protective clothing, hats, and sunglasses when you are outside  · Drive safely  Always wear your seatbelt  Make sure everyone in your car wears a seatbelt  A seatbelt can save your life if you are in an accident  Do not use your cell phone when you are driving  This could distract you and cause an accident  Pull over if you need to make a call or send a text message  · Practice safe sex  Use latex condoms if are sexually active and have more than one partner  Your healthcare provider may recommend screening tests for sexually transmitted infections (STIs)  · Wear helmets, lifejackets, and protective gear  Always wear a helmet when you ride a bike or motorcycle, go skiing, or play sports that could cause a head injury  Wear protective equipment when you play sports   Wear a lifejacket when you are on a boat or doing water sports  CARE AGREEMENT:   You have the right to help plan your care  Learn about your health condition and how it may be treated  Discuss treatment options with your caregivers to decide what care you want to receive  You always have the right to refuse treatment  The above information is an  only  It is not intended as medical advice for individual conditions or treatments  Talk to your doctor, nurse or pharmacist before following any medical regimen to see if it is safe and effective for you  © 2017 [address] Information is for End User's use only and may not be sold, redistributed or otherwise used for commercial purposes  All illustrations and images included in CareNotes® are the copyrighted property of A [employer] M , Inc  or Arturo Oosrio

## 2020-02-19 NOTE — PROGRESS NOTES
Assessment/Plan:     Diagnoses and all orders for this visit:    Non-small cell cancer of right lung (HCC)  -     oxyCODONE (ROXICODONE) 5 mg immediate release tablet; Take 1 tablet (5 mg total) by mouth every 4 (four) hours as needed for moderate pain or severe painMax Daily Amount: 30 mg    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HCC)  -     oxyCODONE (ROXICODONE) 5 mg immediate release tablet; Take 1 tablet (5 mg total) by mouth every 4 (four) hours as needed for moderate pain or severe painMax Daily Amount: 30 mg    Anxiety  -     ALPRAZolam (XANAX) 2 MG tablet; Take 1 tablet (2 mg total) by mouth 3 (three) times a day as needed for anxiety or sleep    Insomnia, unspecified type  -     temazepam (RESTORIL) 30 mg capsule; Take 1 capsule (30 mg total) by mouth daily at bedtime        Subjective:      Patient ID: Colette León is a 64 y o  female  Patient presents to [address] [place] [address] for routine follow up  Allergies, medical history and current medications reviewed with patient; patient reports taking all medications as prescribed without issues  Patient reports she had repeat PET scan completed, and has her follow up with Oncology Dr Lia Mock on Friday to discuss results  Patient also reports she plans to request discontinuing the Refinair due to cost  [place], and states she struggles with tolerating the noise and dust; patient c/o sinus drainage when at home, that seems to improve when she travels to other areas  Patient reports her throat feels better, and denies any current problems or concerns at this time  Patient Care Team:  Earl Jon as PCP - General (Family Medicine)  Denys Petersen MD as PCP - [address] [place] [address] (RTE)  Angel Mantilla MD (Oncology)  Concepción Lion MD (Radiation Oncology)    Review of Systems   HENT: Positive for rhinorrhea  All other systems reviewed and are negative      Objective:    /80 (BP Location: Left arm, Patient Position: Sitting, Cuff Size: Large)   Pulse 82   Temp 98 6 °F (37 °C) (Temporal)   Resp 18   Ht 5' 6" (1 676 m)   Wt 57 8 kg (127 lb 6 4 oz)   SpO2 96%   BMI 20 56 kg/m²      Physical Exam   Constitutional: She is oriented to person, place, and time  She appears well-developed and well-nourished  No distress  HENT:   Head: Normocephalic and atraumatic  Eyes: Conjunctivae and lids are normal    Neck: Normal range of motion  No tracheal deviation present  Cardiovascular: Normal rate, regular rhythm, S1 normal, S2 normal and normal heart sounds  No murmur heard  Pulmonary/Chest: Effort normal and breath sounds normal  No respiratory distress  Abdominal: Normal appearance  She exhibits no distension  There is no guarding  Musculoskeletal: Normal range of motion  Neurological: She is alert and oriented to person, place, and time  Skin: Skin is warm, dry and intact  Psychiatric: She has a normal mood and affect  Her speech is normal    Nursing note and vitals reviewed

## 2020-03-04 ENCOUNTER — OFFICE VISIT (OUTPATIENT)
Dept: FAMILY MEDICINE CLINIC | Facility: CLINIC | Age: 60
End: 2020-03-04
Payer: COMMERCIAL

## 2020-03-04 VITALS
OXYGEN SATURATION: 96 % | HEART RATE: 67 BPM | RESPIRATION RATE: 18 BRPM | WEIGHT: 128.8 LBS | BODY MASS INDEX: 20.7 KG/M2 | DIASTOLIC BLOOD PRESSURE: 80 MMHG | HEIGHT: 66 IN | SYSTOLIC BLOOD PRESSURE: 120 MMHG | TEMPERATURE: 97.3 F

## 2020-03-04 DIAGNOSIS — C34.91 NON-SMALL CELL CANCER OF RIGHT LUNG (HCC): ICD-10-CM

## 2020-03-04 DIAGNOSIS — J43.8 OTHER EMPHYSEMA (HCC): ICD-10-CM

## 2020-03-04 DIAGNOSIS — M79.89 SWELLING OF RIGHT FOOT: ICD-10-CM

## 2020-03-04 DIAGNOSIS — K20.90 ESOPHAGITIS: ICD-10-CM

## 2020-03-04 DIAGNOSIS — M79.671 ACUTE FOOT PAIN, RIGHT: ICD-10-CM

## 2020-03-04 DIAGNOSIS — Z91.81 HISTORY OF RECENT FALL: ICD-10-CM

## 2020-03-04 DIAGNOSIS — C34.91 ADENOSQUAMOUS CARCINOMA OF RIGHT LUNG (HCC): ICD-10-CM

## 2020-03-04 DIAGNOSIS — R29.6 FREQUENT FALLS: ICD-10-CM

## 2020-03-04 DIAGNOSIS — R26.89 BALANCE PROBLEMS: ICD-10-CM

## 2020-03-04 DIAGNOSIS — J02.9 SORE THROAT: Primary | ICD-10-CM

## 2020-03-04 PROCEDURE — 99214 OFFICE O/P EST MOD 30 MIN: CPT | Performed by: NURSE PRACTITIONER

## 2020-03-04 PROCEDURE — 1036F TOBACCO NON-USER: CPT | Performed by: NURSE PRACTITIONER

## 2020-03-04 NOTE — PROGRESS NOTES
Assessment/Plan:     Diagnoses and all orders for this visit:    Sore throat  Comments:  Trial Albuterol; consult ENT if no improvement  Orders:  -     albuterol 2 mg/5 mL syrup; Take 5 mL (2 mg total) by mouth 3 (three) times a day    Esophagitis  -     albuterol 2 mg/5 mL syrup; Take 5 mL (2 mg total) by mouth 3 (three) times a day    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[facility] )  -     Ambulatory Referral to [address] [place] [address]; Future    Non-small cell cancer of right lung [facility])  -     Ambulatory Referral to Home Health; Future    Other emphysema ([facility] )  -     Ambulatory Referral to Home Health; Future    Balance problems  -     Ambulatory Referral to Home Health; Future    Frequent falls  -     Ambulatory Referral to Home Health; Future    History of recent fall  -     XR foot 3+ vw right; Future    Acute foot pain, right  -     XR foot 3+ vw right; Future    Swelling of right foot  -     XR foot 3+ vw right; Future        Subjective:      Patient ID: Karli Cuellar is a 64 y o  female  Patient presents to [address] [place] [address] with complaints of cold-like symptoms  Allergies, medical history and current medications reviewed with patient; patient reports taking all medications as prescribed without issues  Patient c/o a sore throat, ongoing for several days  Patient denies using any OTC medications  Patient had PET scan completed on 2/19/20 indicated diffuse moderate FDG activity in cervical and thoracic esophagus, possibly secondary to mild esophagitis  Patient also reports she fell last week after losing her balance, and sustained abrasions to the left shin, right knee and right foot  Patient states she broke the right foot about 8 years ago, which require casting  Patient reports she has had difficulty keeping her balance since she started Immunotherapy, and is requesting to restart Physical Therapy with [facility]       Patient is also requesting to stop receiving the Purified Air from [employer], who needs a written communication from PCP  Patient reports she is unable to tolerate the treatment, and feels it worsens her symptoms as well as frequent airway irritation  Patient Care Team:  Luz Garcia as PCP - General (Family Medicine)  Yenny Vieira MD as PCP - [address] [place], [place] [id number] [facility] (RTE)  Erica Mcfarlane MD (Oncology)  Zoran Cook MD (Radiation Oncology)    Review of Systems   HENT: Positive for sore throat  Musculoskeletal: Positive for arthralgias  Neurological: Positive for weakness  All other systems reviewed and are negative  Objective:    /80 (BP Location: Right arm, Patient Position: Sitting, Cuff Size: Large)   Pulse 67   Temp (!) 97 3 °F (36 3 °C) (Temporal)   Resp 18   Ht 5' 6" (1 676 m)   Wt 58 4 kg (128 lb 12 8 oz)   SpO2 96%   BMI 20 79 kg/m²      Physical Exam   Constitutional: She is oriented to person, place, and time  She appears well-developed and well-nourished  No distress  HENT:   Head: Normocephalic and atraumatic  Mouth/Throat: Uvula is midline, oropharynx is clear and moist and mucous membranes are normal    Eyes: Conjunctivae and lids are normal    Neck: Normal range of motion  No tracheal deviation present  Cardiovascular: Normal rate  Pulmonary/Chest: Effort normal  No respiratory distress  Abdominal: Normal appearance  She exhibits no distension  There is no guarding  Musculoskeletal: Normal range of motion  Right foot: There is tenderness and swelling  Neurological: She is alert and oriented to person, place, and time  Skin: Skin is warm and dry  Abrasion (left shin and right knee) and bruising (left shin and right knee) noted  Psychiatric: She has a normal mood and affect  Her speech is normal    Nursing note and vitals reviewed

## 2020-03-09 ENCOUNTER — TELEPHONE (OUTPATIENT)
Dept: FAMILY MEDICINE CLINIC | Facility: CLINIC | Age: 60
End: 2020-03-09

## 2020-03-17 ENCOUNTER — TELEPHONE (OUTPATIENT)
Dept: FAMILY MEDICINE CLINIC | Facility: CLINIC | Age: 60
End: 2020-03-17

## 2020-03-17 NOTE — TELEPHONE ENCOUNTER
Kiah Monk from [employer] called   Just wanted to make us aware that patient is refusing nursing services she only wants therapy

## 2020-03-18 DIAGNOSIS — G47.00 INSOMNIA, UNSPECIFIED TYPE: ICD-10-CM

## 2020-03-18 DIAGNOSIS — F41.9 ANXIETY: ICD-10-CM

## 2020-03-18 DIAGNOSIS — C34.91 ADENOSQUAMOUS CARCINOMA OF RIGHT LUNG (HCC): ICD-10-CM

## 2020-03-18 DIAGNOSIS — C34.91 NON-SMALL CELL CANCER OF RIGHT LUNG (HCC): ICD-10-CM

## 2020-03-18 RX ORDER — ALPRAZOLAM 2 MG/1
2 TABLET ORAL 3 TIMES DAILY PRN
Qty: 90 TABLET | Refills: 0 | Status: SHIPPED | OUTPATIENT
Start: 2020-03-18 | End: 2020-04-15 | Stop reason: SDUPTHER

## 2020-03-18 RX ORDER — TEMAZEPAM 30 MG/1
30 CAPSULE ORAL
Qty: 30 CAPSULE | Refills: 0 | Status: SHIPPED | OUTPATIENT
Start: 2020-03-18 | End: 2020-04-15 | Stop reason: SDUPTHER

## 2020-03-18 RX ORDER — OXYCODONE HYDROCHLORIDE 5 MG/1
5 TABLET ORAL EVERY 4 HOURS PRN
Qty: 180 TABLET | Refills: 0 | Status: SHIPPED | OUTPATIENT
Start: 2020-03-18 | End: 2020-04-15 | Stop reason: SDUPTHER

## 2020-04-15 DIAGNOSIS — F41.9 ANXIETY: ICD-10-CM

## 2020-04-15 DIAGNOSIS — C34.91 NON-SMALL CELL CANCER OF RIGHT LUNG (HCC): ICD-10-CM

## 2020-04-15 DIAGNOSIS — C34.91 ADENOSQUAMOUS CARCINOMA OF RIGHT LUNG (HCC): ICD-10-CM

## 2020-04-15 DIAGNOSIS — G47.00 INSOMNIA, UNSPECIFIED TYPE: ICD-10-CM

## 2020-04-15 RX ORDER — ALPRAZOLAM 2 MG/1
2 TABLET ORAL 3 TIMES DAILY PRN
Qty: 90 TABLET | Refills: 0 | Status: SHIPPED | OUTPATIENT
Start: 2020-04-15 | End: 2020-05-12 | Stop reason: SDUPTHER

## 2020-04-15 RX ORDER — TEMAZEPAM 30 MG/1
30 CAPSULE ORAL
Qty: 30 CAPSULE | Refills: 0 | Status: SHIPPED | OUTPATIENT
Start: 2020-04-15 | End: 2020-05-12 | Stop reason: SDUPTHER

## 2020-04-15 RX ORDER — OXYCODONE HYDROCHLORIDE 5 MG/1
5 TABLET ORAL EVERY 4 HOURS PRN
Qty: 180 TABLET | Refills: 0 | Status: SHIPPED | OUTPATIENT
Start: 2020-04-15 | End: 2020-05-12 | Stop reason: SDUPTHER

## 2020-04-23 ENCOUNTER — TELEPHONE (OUTPATIENT)
Dept: FAMILY MEDICINE CLINIC | Facility: CLINIC | Age: 60
End: 2020-04-23

## 2020-05-12 DIAGNOSIS — F41.9 ANXIETY: ICD-10-CM

## 2020-05-12 DIAGNOSIS — G47.00 INSOMNIA, UNSPECIFIED TYPE: ICD-10-CM

## 2020-05-12 DIAGNOSIS — C34.91 ADENOSQUAMOUS CARCINOMA OF RIGHT LUNG (HCC): ICD-10-CM

## 2020-05-12 DIAGNOSIS — C34.91 NON-SMALL CELL CANCER OF RIGHT LUNG (HCC): ICD-10-CM

## 2020-05-12 RX ORDER — ALPRAZOLAM 2 MG/1
2 TABLET ORAL 3 TIMES DAILY PRN
Qty: 90 TABLET | Refills: 0 | Status: SHIPPED | OUTPATIENT
Start: 2020-05-12 | End: 2020-06-09 | Stop reason: SDUPTHER

## 2020-05-12 RX ORDER — TEMAZEPAM 30 MG/1
30 CAPSULE ORAL
Qty: 30 CAPSULE | Refills: 0 | Status: SHIPPED | OUTPATIENT
Start: 2020-05-12 | End: 2020-06-09 | Stop reason: SDUPTHER

## 2020-05-12 RX ORDER — OXYCODONE HYDROCHLORIDE 5 MG/1
5 TABLET ORAL EVERY 4 HOURS PRN
Qty: 180 TABLET | Refills: 0 | Status: SHIPPED | OUTPATIENT
Start: 2020-05-12 | End: 2020-06-09 | Stop reason: SDUPTHER

## 2020-06-09 ENCOUNTER — OFFICE VISIT (OUTPATIENT)
Dept: FAMILY MEDICINE CLINIC | Facility: CLINIC | Age: 60
End: 2020-06-09
Payer: COMMERCIAL

## 2020-06-09 VITALS
RESPIRATION RATE: 18 BRPM | WEIGHT: 140 LBS | TEMPERATURE: 98.2 F | SYSTOLIC BLOOD PRESSURE: 106 MMHG | HEART RATE: 84 BPM | BODY MASS INDEX: 22.5 KG/M2 | OXYGEN SATURATION: 96 % | DIASTOLIC BLOOD PRESSURE: 72 MMHG | HEIGHT: 66 IN

## 2020-06-09 DIAGNOSIS — E78.5 DYSLIPIDEMIA: ICD-10-CM

## 2020-06-09 DIAGNOSIS — C34.91 ADENOSQUAMOUS CARCINOMA OF RIGHT LUNG (HCC): ICD-10-CM

## 2020-06-09 DIAGNOSIS — C34.91 NON-SMALL CELL CANCER OF RIGHT LUNG (HCC): ICD-10-CM

## 2020-06-09 DIAGNOSIS — F41.9 ANXIETY: ICD-10-CM

## 2020-06-09 DIAGNOSIS — R05.3 CHRONIC COUGH: ICD-10-CM

## 2020-06-09 DIAGNOSIS — G47.09 OTHER INSOMNIA: ICD-10-CM

## 2020-06-09 DIAGNOSIS — Z00.00 ENCOUNTER FOR MEDICARE ANNUAL WELLNESS EXAM: Primary | ICD-10-CM

## 2020-06-09 PROCEDURE — 1036F TOBACCO NON-USER: CPT | Performed by: NURSE PRACTITIONER

## 2020-06-09 PROCEDURE — 3008F BODY MASS INDEX DOCD: CPT | Performed by: NURSE PRACTITIONER

## 2020-06-09 PROCEDURE — G0439 PPPS, SUBSEQ VISIT: HCPCS | Performed by: NURSE PRACTITIONER

## 2020-06-09 RX ORDER — TEMAZEPAM 30 MG/1
30 CAPSULE ORAL
Qty: 30 CAPSULE | Refills: 0 | Status: SHIPPED | OUTPATIENT
Start: 2020-06-09 | End: 2020-07-02 | Stop reason: SDUPTHER

## 2020-06-09 RX ORDER — LEVALBUTEROL TARTRATE 45 UG/1
1-2 AEROSOL, METERED ORAL EVERY 4 HOURS PRN
COMMUNITY
End: 2020-09-01 | Stop reason: SDUPTHER

## 2020-06-09 RX ORDER — ALPRAZOLAM 2 MG/1
2 TABLET ORAL 3 TIMES DAILY PRN
Qty: 90 TABLET | Refills: 0 | Status: SHIPPED | OUTPATIENT
Start: 2020-06-09 | End: 2020-07-02 | Stop reason: SDUPTHER

## 2020-06-09 RX ORDER — HYDROCODONE POLISTIREX AND CHLORPHENIRAMINE POLISTIREX 10; 8 MG/5ML; MG/5ML
5 SUSPENSION, EXTENDED RELEASE ORAL EVERY 12 HOURS PRN
Qty: 120 ML | Refills: 0 | Status: SHIPPED | OUTPATIENT
Start: 2020-06-09 | End: 2020-12-29 | Stop reason: SDUPTHER

## 2020-06-09 RX ORDER — OXYCODONE HYDROCHLORIDE 5 MG/1
5 TABLET ORAL EVERY 4 HOURS PRN
Qty: 180 TABLET | Refills: 0 | Status: SHIPPED | OUTPATIENT
Start: 2020-06-09 | End: 2020-07-02 | Stop reason: SDUPTHER

## 2020-06-19 ENCOUNTER — TELEPHONE (OUTPATIENT)
Dept: FAMILY MEDICINE CLINIC | Facility: CLINIC | Age: 60
End: 2020-06-19

## 2020-06-19 DIAGNOSIS — K21.00 GASTROESOPHAGEAL REFLUX DISEASE WITH ESOPHAGITIS: ICD-10-CM

## 2020-06-19 DIAGNOSIS — K20.90 ESOPHAGITIS: ICD-10-CM

## 2020-06-19 DIAGNOSIS — J31.2 CHRONIC SORE THROAT: ICD-10-CM

## 2020-06-19 DIAGNOSIS — J04.0 LARYNGITIS: ICD-10-CM

## 2020-06-19 DIAGNOSIS — K21.9 GASTROESOPHAGEAL REFLUX DISEASE WITHOUT ESOPHAGITIS: Primary | ICD-10-CM

## 2020-06-19 RX ORDER — FAMOTIDINE 40 MG/1
40 TABLET, FILM COATED ORAL
Qty: 30 TABLET | Refills: 0 | Status: SHIPPED | OUTPATIENT
Start: 2020-06-19 | End: 2021-04-27

## 2020-06-24 ENCOUNTER — DOCTOR'S OFFICE (OUTPATIENT)
Dept: URBAN - NONMETROPOLITAN AREA CLINIC 1 | Facility: CLINIC | Age: 60
Setting detail: OPHTHALMOLOGY
End: 2020-06-24
Payer: COMMERCIAL

## 2020-06-24 DIAGNOSIS — S05.02XA: ICD-10-CM

## 2020-06-24 DIAGNOSIS — H40.033: ICD-10-CM

## 2020-06-24 DIAGNOSIS — T15.11XA: ICD-10-CM

## 2020-06-24 PROCEDURE — 92002 INTRM OPH EXAM NEW PATIENT: CPT | Performed by: OPHTHALMOLOGY

## 2020-06-24 PROCEDURE — 92132 CPTRZD OPH DX IMG ANT SGM: CPT | Performed by: OPHTHALMOLOGY

## 2020-06-24 ASSESSMENT — REFRACTION_AUTOREFRACTION
OS_AXIS: 011
OS_CYLINDER: -1.00
OD_CYLINDER: -0.25
OD_AXIS: 056
OS_SPHERE: +1.75
OD_SPHERE: +1.25

## 2020-06-24 ASSESSMENT — REFRACTION_CURRENTRX
OS_SPHERE: +0.75
OD_CYLINDER: -0.75
OS_CYLINDER: -1.75
OS_OVR_VA: 20/
OD_OVR_VA: 20/
OD_SPHERE: +2.00
OS_VPRISM_DIRECTION: SV
OD_VPRISM_DIRECTION: SV
OS_AXIS: 008
OD_AXIS: 025

## 2020-06-24 ASSESSMENT — SPHEQUIV_DERIVED
OD_SPHEQUIV: 1.125
OS_SPHEQUIV: 1.25

## 2020-06-24 ASSESSMENT — VISUAL ACUITY
OD_BCVA: 20/40-2
OS_BCVA: 20/25+2

## 2020-06-24 ASSESSMENT — CONFRONTATIONAL VISUAL FIELD TEST (CVF)
OD_FINDINGS: FULL
OS_FINDINGS: FULL

## 2020-06-24 ASSESSMENT — CORNEAL TRAUMA - ABRASION: OS_ABRASION: PRESENT

## 2020-06-30 ENCOUNTER — AMBUL SURGICAL CARE (OUTPATIENT)
Dept: URBAN - NONMETROPOLITAN AREA SURGERY 1 | Facility: SURGERY | Age: 60
Setting detail: OPHTHALMOLOGY
End: 2020-06-30
Payer: COMMERCIAL

## 2020-06-30 DIAGNOSIS — H40.031: ICD-10-CM

## 2020-06-30 PROCEDURE — G8918 PT W/O PREOP ORDER IV AB PRO: HCPCS | Performed by: OPHTHALMOLOGY

## 2020-06-30 PROCEDURE — 66761 REVISION OF IRIS: CPT | Performed by: OPHTHALMOLOGY

## 2020-06-30 PROCEDURE — G8907 PT DOC NO EVENTS ON DISCHARG: HCPCS | Performed by: OPHTHALMOLOGY

## 2020-07-02 ENCOUNTER — OFFICE VISIT (OUTPATIENT)
Dept: FAMILY MEDICINE CLINIC | Facility: CLINIC | Age: 60
End: 2020-07-02
Payer: COMMERCIAL

## 2020-07-02 ENCOUNTER — AMBUL SURGICAL CARE (OUTPATIENT)
Dept: URBAN - NONMETROPOLITAN AREA SURGERY 1 | Facility: SURGERY | Age: 60
Setting detail: OPHTHALMOLOGY
End: 2020-07-02
Payer: COMMERCIAL

## 2020-07-02 VITALS
DIASTOLIC BLOOD PRESSURE: 90 MMHG | WEIGHT: 140.4 LBS | HEIGHT: 66 IN | BODY MASS INDEX: 22.56 KG/M2 | SYSTOLIC BLOOD PRESSURE: 132 MMHG | RESPIRATION RATE: 18 BRPM | TEMPERATURE: 97.4 F | OXYGEN SATURATION: 95 % | HEART RATE: 93 BPM

## 2020-07-02 DIAGNOSIS — F41.8 DEPRESSION WITH ANXIETY: ICD-10-CM

## 2020-07-02 DIAGNOSIS — C34.91 ADENOSQUAMOUS CARCINOMA OF RIGHT LUNG (HCC): ICD-10-CM

## 2020-07-02 DIAGNOSIS — H40.032: ICD-10-CM

## 2020-07-02 DIAGNOSIS — C34.91 NON-SMALL CELL CANCER OF RIGHT LUNG (HCC): ICD-10-CM

## 2020-07-02 DIAGNOSIS — F41.9 ANXIETY: ICD-10-CM

## 2020-07-02 DIAGNOSIS — G47.09 OTHER INSOMNIA: ICD-10-CM

## 2020-07-02 DIAGNOSIS — H60.393 OTHER INFECTIVE ACUTE OTITIS EXTERNA OF BOTH EARS: Primary | ICD-10-CM

## 2020-07-02 DIAGNOSIS — E78.2 MIXED HYPERLIPIDEMIA: ICD-10-CM

## 2020-07-02 PROCEDURE — G8918 PT W/O PREOP ORDER IV AB PRO: HCPCS | Performed by: OPHTHALMOLOGY

## 2020-07-02 PROCEDURE — 99214 OFFICE O/P EST MOD 30 MIN: CPT | Performed by: NURSE PRACTITIONER

## 2020-07-02 PROCEDURE — G8907 PT DOC NO EVENTS ON DISCHARG: HCPCS | Performed by: OPHTHALMOLOGY

## 2020-07-02 PROCEDURE — 66761 REVISION OF IRIS: CPT | Performed by: OPHTHALMOLOGY

## 2020-07-02 PROCEDURE — 1036F TOBACCO NON-USER: CPT | Performed by: NURSE PRACTITIONER

## 2020-07-02 RX ORDER — ALPRAZOLAM 2 MG/1
2 TABLET ORAL 3 TIMES DAILY PRN
Qty: 90 TABLET | Refills: 0 | Status: SHIPPED | OUTPATIENT
Start: 2020-07-02 | End: 2020-07-31 | Stop reason: SDUPTHER

## 2020-07-02 RX ORDER — TEMAZEPAM 30 MG/1
30 CAPSULE ORAL
Qty: 30 CAPSULE | Refills: 0 | Status: SHIPPED | OUTPATIENT
Start: 2020-07-02 | End: 2020-07-31 | Stop reason: SDUPTHER

## 2020-07-02 RX ORDER — CIPROFLOXACIN AND DEXAMETHASONE 3; 1 MG/ML; MG/ML
4 SUSPENSION/ DROPS AURICULAR (OTIC) 2 TIMES DAILY
Qty: 7.5 ML | Refills: 0 | Status: SHIPPED | OUTPATIENT
Start: 2020-07-02 | End: 2021-04-27

## 2020-07-02 RX ORDER — ERYTHROMYCIN 5 MG/G
OINTMENT OPHTHALMIC
COMMUNITY
Start: 2020-06-24 | End: 2021-04-27

## 2020-07-02 RX ORDER — PREDNISOLONE ACETATE 10 MG/ML
SUSPENSION/ DROPS OPHTHALMIC
COMMUNITY
Start: 2020-06-30 | End: 2021-04-27

## 2020-07-02 RX ORDER — OXYCODONE HYDROCHLORIDE 5 MG/1
5 TABLET ORAL EVERY 4 HOURS PRN
Qty: 180 TABLET | Refills: 0 | Status: SHIPPED | OUTPATIENT
Start: 2020-07-02 | End: 2020-07-31 | Stop reason: SDUPTHER

## 2020-07-02 RX ORDER — ATORVASTATIN CALCIUM 40 MG/1
40 TABLET, FILM COATED ORAL DAILY
Qty: 100 TABLET | Refills: 3 | Status: SHIPPED | OUTPATIENT
Start: 2020-07-02 | End: 2020-09-01

## 2020-07-02 RX ORDER — SERTRALINE HYDROCHLORIDE 100 MG/1
150 TABLET, FILM COATED ORAL
Qty: 150 TABLET | Refills: 3 | Status: SHIPPED | OUTPATIENT
Start: 2020-07-02 | End: 2021-07-26 | Stop reason: SDUPTHER

## 2020-07-02 NOTE — PROGRESS NOTES
Assessment/Plan:     Diagnoses and all orders for this visit:    Other infective acute otitis externa of both ears  -     ciprofloxacin-dexamethasone (CIPRODEX) otic suspension; Administer 4 drops into both ears 2 (two) times a day for 7 days    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HCC)  -     Discontinue: oxyCODONE (ROXICODONE) 5 mg immediate release tablet; Take 1 tablet (5 mg total) by mouth every 4 (four) hours as needed for moderate pain or severe painMax Daily Amount: 30 mg    Non-small cell cancer of right lung (HCC)  -     Discontinue: oxyCODONE (ROXICODONE) 5 mg immediate release tablet; Take 1 tablet (5 mg total) by mouth every 4 (four) hours as needed for moderate pain or severe painMax Daily Amount: 30 mg    Depression with anxiety  -     sertraline (ZOLOFT) 100 mg tablet; Take 1 5 tablets (150 mg total) by mouth daily at bedtime    Anxiety  -     Discontinue: ALPRAZolam (XANAX) 2 MG tablet; Take 1 tablet (2 mg total) by mouth 3 (three) times a day as needed for anxiety or sleep    Mixed hyperlipidemia  -     atorvastatin (LIPITOR) 40 mg tablet; Take 1 tablet (40 mg total) by mouth daily    Other insomnia  -     Discontinue: temazepam (RESTORIL) 30 mg capsule; Take 1 capsule (30 mg total) by mouth daily at bedtime    Other orders  -     erythromycin (ILOTYCIN) ophthalmic ointment  -     prednisoLONE acetate (PRED FORTE) 1 % ophthalmic suspension        Subjective:      Patient ID: Nilesh Bradford is a 64 y o  female  Patient presents to [address] [place] [address] as follow up  Allergies, medical history and current medications reviewed with patient; patient reports taking all medications as prescribed without issues  Patient reports she accidentally fell asleep on her shirt, which stuck to her eye  Patient had seen Ophthalmology Dr Rashaad Stockton of [facility], who diagnosed her with a scleral abrasion along with bilateral glaucoma   Patient reports she had right eye surgery, and is scheduled for repeat surgery to the left eye this afternoon  Patient also reports next week is the 5th anniversary of her mother's death, who passed of lung cancer; patient states she is struggling with the emotional implications  Patient notes elevation in BP associated with this time of the year; patient denies any chest pain, headaches or increased shortness of breath  Patient was also previously prescribed Albuterol and Pepcid for chronic sore throat, and was ultimately referred to ENT for further evaluation  Patient states the medication was not effective, and she is scheduled with ENT Dr Molly Knapp on 7/9/20  Patient also c/o itching and irritation to the bilateral ears  Patient Care Team:  Glendy Chatterjee as PCP - General (Family Medicine)  Eleazar Meraz MD as PCP - [address] [place], [place] [address]Th [facility] (RTE)  Tai Quinonez MD (Oncology)  Lisbeth Johnson MD (Radiation Oncology)  [facility] (Ophthalmology)    Review of Systems   HENT: Positive for ear pain (itching)  Eyes: Positive for itching  Respiratory: Negative for shortness of breath  Cardiovascular: Negative for chest pain  Neurological: Negative for headaches  All other systems reviewed and are negative  Objective:    /90 (BP Location: Left arm, Patient Position: Sitting, Cuff Size: Adult)   Pulse 93   Temp (!) 97 4 °F (36 3 °C) (Temporal)   Resp 18   Ht 5' 6" (1 676 m)   Wt 63 7 kg (140 lb 6 4 oz)   SpO2 95%   BMI 22 66 kg/m²      Physical Exam  Vitals signs and nursing note reviewed  Constitutional:       General: She is not in acute distress  Appearance: Normal appearance  She is well-developed  HENT:      Head: Normocephalic and atraumatic  Right Ear: Swelling (with erythema) present  Left Ear: Swelling (with erythema) present  Eyes:      General: Lids are normal       Conjunctiva/sclera: Conjunctivae normal    Neck:      Musculoskeletal: Normal range of motion  Trachea: No tracheal deviation  Cardiovascular:      Rate and Rhythm: Normal rate and regular rhythm  Heart sounds: Normal heart sounds, S1 normal and S2 normal  No murmur  Pulmonary:      Effort: Pulmonary effort is normal  No respiratory distress  Breath sounds: Normal breath sounds  Abdominal:      General: There is no distension  Tenderness: There is no guarding  Musculoskeletal: Normal range of motion  Skin:     General: Skin is warm and dry  Neurological:      Mental Status: She is alert and oriented to person, place, and time     Psychiatric:         Speech: Speech normal

## 2020-07-21 ENCOUNTER — DOCTOR'S OFFICE (OUTPATIENT)
Dept: URBAN - NONMETROPOLITAN AREA CLINIC 1 | Facility: CLINIC | Age: 60
Setting detail: OPHTHALMOLOGY
End: 2020-07-21
Payer: COMMERCIAL

## 2020-07-21 DIAGNOSIS — H25.13: ICD-10-CM

## 2020-07-21 DIAGNOSIS — H40.033: ICD-10-CM

## 2020-07-21 PROBLEM — H25.11 CATARACT NUCLEAR SCLEROSIS; RIGHT EYE, LEFT EYE: Status: ACTIVE | Noted: 2020-07-21

## 2020-07-21 PROBLEM — H25.12 CATARACT NUCLEAR SCLEROSIS; RIGHT EYE, LEFT EYE: Status: ACTIVE | Noted: 2020-07-21

## 2020-07-21 PROBLEM — T15.11XA FB CONJUNCTIVAL SAC; RIGHT EYE INITIAL ENCOUNTER: Status: RESOLVED | Noted: 2020-06-24 | Resolved: 2020-07-21

## 2020-07-21 PROCEDURE — 92014 COMPRE OPH EXAM EST PT 1/>: CPT | Performed by: OPHTHALMOLOGY

## 2020-07-21 ASSESSMENT — CONFRONTATIONAL VISUAL FIELD TEST (CVF)
OS_FINDINGS: FULL
OD_FINDINGS: FULL

## 2020-07-21 ASSESSMENT — VISUAL ACUITY
OS_BCVA: 20/30
OD_BCVA: 20/40-2

## 2020-07-21 ASSESSMENT — REFRACTION_AUTOREFRACTION
OS_CYLINDER: -1.00
OD_CYLINDER: -0.25
OS_AXIS: 011
OS_SPHERE: +1.75
OD_SPHERE: +1.25
OD_AXIS: 056

## 2020-07-21 ASSESSMENT — REFRACTION_CURRENTRX
OD_VPRISM_DIRECTION: SV
OS_VPRISM_DIRECTION: SV
OD_SPHERE: +2.00
OD_OVR_VA: 20/
OD_CYLINDER: -0.75
OS_CYLINDER: -1.75
OS_SPHERE: +0.75
OS_AXIS: 008
OD_AXIS: 025
OS_OVR_VA: 20/

## 2020-07-21 ASSESSMENT — SPHEQUIV_DERIVED
OD_SPHEQUIV: 1.125
OS_SPHEQUIV: 1.25

## 2020-07-21 ASSESSMENT — CORNEAL TRAUMA - ABRASION: OS_ABRASION: ABSENT

## 2020-07-23 ENCOUNTER — TELEMEDICINE (OUTPATIENT)
Dept: FAMILY MEDICINE CLINIC | Facility: CLINIC | Age: 60
End: 2020-07-23
Payer: COMMERCIAL

## 2020-07-23 DIAGNOSIS — B37.0 ORAL CANDIDIASIS: ICD-10-CM

## 2020-07-23 DIAGNOSIS — R07.0 THROAT PAIN IN ADULT: Primary | ICD-10-CM

## 2020-07-23 PROCEDURE — 99442 PR PHYS/QHP TELEPHONE EVALUATION 11-20 MIN: CPT | Performed by: NURSE PRACTITIONER

## 2020-07-23 NOTE — PROGRESS NOTES
Virtual Brief Visit    Assessment/Plan:    Problem List Items Addressed This Visit     None      Visit Diagnoses     Throat pain in adult    -  Primary    Reschedule with ENT for further evaluation    Oral candidiasis        Relevant Medications    nystatin (MYCOSTATIN) 500,000 units/5 mL suspension         Reason for visit is mouth sores  Patient c/o white spots to the back of the throat with "itching" to the roof of the mouth  Patient was scheduled with ENT Dr Maritza Bryant for further evaluation, but states she needed to cancel; patient requesting to be rescheduled with ENT at this time  Encounter provider KIERRA Diaz    Provider located at [address] [phone]    Recent Visits  Date Type Provider Dept   07/23/20 Telemedicine KIERRA De Oliveira  Mike Chang   Showing recent visits within past 7 days and meeting all other requirements     Future Appointments  No visits were found meeting these conditions  Showing future appointments within next 150 days and meeting all other requirements      After connecting through telephone, the patient was identified by name and date of birth  Joao Valdes was informed that this is a telemedicine visit and that the visit is being conducted through telephone  My office door was closed  No one else was in the room  She acknowledged consent and understanding of privacy and security of the platform  The patient has agreed to participate and understands she can discontinue the visit at any time  Patient is aware this is a billable service       Subjective    Joao Valdes is a 64 y o  female     HPI     Past Medical History:   Diagnosis Date    Cancer [facility]     Diabetes mellitus ([facility] )     borderline    Irregular heartbeat     Pancreatitis     Pre cancerous lesions per Benjy Freedman     Past Surgical History:   Procedure Laterality Date    BRONCHOSCOPY      CHOLECYSTECTOMY       Current Outpatient Medications   Medication Sig Dispense Refill    albuterol 2 mg/5 mL syrup Take 5 mL (2 mg total) by mouth 3 (three) times a day 240 mL 0    ALPRAZolam (XANAX) 2 MG tablet Take 1 tablet (2 mg total) by mouth 3 (three) times a day as needed for anxiety or sleep 90 tablet 0    atorvastatin (LIPITOR) 40 mg tablet Take 1 tablet (40 mg total) by mouth daily 100 tablet 3    Blood Pressure Monitoring (BLOOD PRESSURE CUFF) MISC by Does not apply route daily 1 each 0    ciprofloxacin-dexamethasone (CIPRODEX) otic suspension Administer 4 drops into both ears 2 (two) times a day for 7 days 7 5 mL 0    erythromycin (ILOTYCIN) ophthalmic ointment       famotidine (PEPCID) 40 MG tablet Take 1 tablet (40 mg total) by mouth daily at bedtime 30 tablet 0    hydrocodone-chlorpheniramine polistirex (TUSSIONEX) 10-8 mg/5 mL ER suspension Take 5 mL by mouth every 12 (twelve) hours as needed for coughMax Daily Amount: 10 mL 120 mL 0    levalbuterol (XOPENEX HFA) 45 mcg/act inhaler Inhale 1-2 puffs every 4 (four) hours as needed      Masks (FACE MASK EARLOOP-STYLE) MISC by Does not apply route 3 (three) times a day as needed (Infection prevention) 150 each 5    Misc  Devices (COMMODE BEDSIDE) MISC by Does not apply route daily as needed (shortness of breath) 1 each 0    mupirocin (BACTROBAN) 2 % nasal ointment into each nostril 2 (two) times a day 10 g 0    nicotine (NICODERM CQ) 7 mg/24hr TD 24 hr patch Place 1 patch on the skin every 24 hours 84 patch 0    nivolumab (OPDIVO) 240 mg/24 mL SOLN injection Infuse 480 mg into a venous catheter      nystatin (MYCOSTATIN) 500,000 units/5 mL suspension Swish 5 mL around mouth and swallow 4 times daily for 14 days   60 mL 0    oxyCODONE (ROXICODONE) 5 mg immediate release tablet Take 1 tablet (5 mg total) by mouth every 4 (four) hours as needed for moderate pain or severe painMax Daily Amount: 30 mg 180 tablet 0    polyethylene glycol (MIRALAX) 17 g packet Take 17 g by mouth daily   0    prednisoLONE acetate (PRED FORTE) 1 % ophthalmic suspension       predniSONE 5 mg tablet Take 15 mg by mouth daily      prochlorperazine (COMPAZINE) 10 mg tablet Take 1 tablet by mouth every 6 (six) hours as needed  3    senna-docusate sodium (SENOKOT-S) 8 6-50 mg per tablet Take 1 tablet by mouth daily at bedtime 30 tablet 5    sertraline (ZOLOFT) 100 mg tablet Take 1 5 tablets (150 mg total) by mouth daily at bedtime 150 tablet 3    SUMAtriptan (IMITREX) 50 mg tablet Take 1 tablet (50 mg total) by mouth daily as needed for migraine for up to 1 dose 30 tablet 0    temazepam (RESTORIL) 30 mg capsule Take 1 capsule (30 mg total) by mouth daily at bedtime 30 capsule 0    Thermometer MISC by Does not apply route daily 1 each 0    tiotropium (SPIRIVA) 18 mcg inhalation capsule Place 18 mcg into inhaler and inhale daily      Tiotropium Bromide Monohydrate (SPIRIVA HANDIHALER IN) Inhale 2 puffs as needed       No current facility-administered medications for this visit  Allergies   Allergen Reactions    Sulfa Antibiotics Rash and Palpitations    Ephraim Flavor Hives    Codeine     Lisinopril Cough    Mangifera Indica     Nsaids      daypro, orudis    can take motrin    Other      Steroid shots:  Heart race, shakes    Poison Ivy Extract      Other reaction(s): Other (See Comments)  Steroid shots:  Heart race, shakes  Mangoes: rash    Naproxen Rash    Prednisone Palpitations     Review of Systems   HENT: Positive for mouth sores and sore throat  All other systems reviewed and are negative  There were no vitals filed for this visit  I spent 15 minutes directly with the patient during this visit    [address] [place] [address] acknowledges that she has consented to an online visit or consultation   She understands that the online visit is based solely on information provided by her, and that, in the absence of a face-to-face physical evaluation by the physician, the diagnosis she receives is both limited and provisional in terms of accuracy and completeness  This is not intended to replace a full medical face-to-face evaluation by the physician  Manuelito Olivarez understands and accepts these terms

## 2020-07-31 ENCOUNTER — OFFICE VISIT (OUTPATIENT)
Dept: FAMILY MEDICINE CLINIC | Facility: CLINIC | Age: 60
End: 2020-07-31
Payer: COMMERCIAL

## 2020-07-31 VITALS
HEIGHT: 66 IN | OXYGEN SATURATION: 95 % | RESPIRATION RATE: 18 BRPM | SYSTOLIC BLOOD PRESSURE: 120 MMHG | HEART RATE: 67 BPM | TEMPERATURE: 97.5 F | DIASTOLIC BLOOD PRESSURE: 84 MMHG | BODY MASS INDEX: 22.79 KG/M2 | WEIGHT: 141.8 LBS

## 2020-07-31 DIAGNOSIS — C34.91 ADENOSQUAMOUS CARCINOMA OF RIGHT LUNG (HCC): ICD-10-CM

## 2020-07-31 DIAGNOSIS — C34.91 NON-SMALL CELL CANCER OF RIGHT LUNG (HCC): ICD-10-CM

## 2020-07-31 DIAGNOSIS — F51.04 CHRONIC INSOMNIA: ICD-10-CM

## 2020-07-31 DIAGNOSIS — F41.9 ANXIETY: ICD-10-CM

## 2020-07-31 DIAGNOSIS — Z09 FOLLOW UP: Primary | ICD-10-CM

## 2020-07-31 PROBLEM — J96.01 ACUTE RESPIRATORY FAILURE WITH HYPOXIA (HCC): Status: ACTIVE | Noted: 2020-07-31

## 2020-07-31 PROCEDURE — 99213 OFFICE O/P EST LOW 20 MIN: CPT | Performed by: NURSE PRACTITIONER

## 2020-07-31 PROCEDURE — 1036F TOBACCO NON-USER: CPT | Performed by: NURSE PRACTITIONER

## 2020-07-31 PROCEDURE — 3008F BODY MASS INDEX DOCD: CPT | Performed by: NURSE PRACTITIONER

## 2020-07-31 RX ORDER — ALPRAZOLAM 2 MG/1
2 TABLET ORAL 3 TIMES DAILY PRN
Qty: 90 TABLET | Refills: 0 | Status: SHIPPED | OUTPATIENT
Start: 2020-07-31 | End: 2020-09-01 | Stop reason: SDUPTHER

## 2020-07-31 RX ORDER — TEMAZEPAM 30 MG/1
30 CAPSULE ORAL
Qty: 30 CAPSULE | Refills: 0 | Status: SHIPPED | OUTPATIENT
Start: 2020-07-31 | End: 2020-09-01 | Stop reason: SDUPTHER

## 2020-07-31 RX ORDER — OXYCODONE HYDROCHLORIDE 5 MG/1
5 TABLET ORAL EVERY 4 HOURS PRN
Qty: 180 TABLET | Refills: 0 | Status: SHIPPED | OUTPATIENT
Start: 2020-07-31 | End: 2020-09-01 | Stop reason: SDUPTHER

## 2020-07-31 NOTE — PROGRESS NOTES
Assessment/Plan:     Diagnoses and all orders for this visit:    Follow up    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HCC)  -     oxyCODONE (ROXICODONE) 5 mg immediate release tablet; Take 1 tablet (5 mg total) by mouth every 4 (four) hours as needed for moderate pain or severe painMax Daily Amount: 30 mg    Non-small cell cancer of right lung (HCC)  -     oxyCODONE (ROXICODONE) 5 mg immediate release tablet; Take 1 tablet (5 mg total) by mouth every 4 (four) hours as needed for moderate pain or severe painMax Daily Amount: 30 mg    Anxiety  -     ALPRAZolam (XANAX) 2 MG tablet; Take 1 tablet (2 mg total) by mouth 3 (three) times a day as needed for anxiety or sleep    Chronic insomnia  -     temazepam (RESTORIL) 30 mg capsule; Take 1 capsule (30 mg total) by mouth daily at bedtime    Other orders  -     Magnesium 200 MG CHEW; Chew 200 mg daily        Subjective:      Patient ID: Patric Buerger is a 64 y o  female  Patient presents to [address] [place] [address] as follow up  Allergies, medical history and current medications reviewed with patient; patient reports taking all medications as prescribed without issues  Patient states she has started taking OTC Magnesium gummies daily to help with muscle spasms and cramping, which she states has been effective  Patient also reports she's since stopped drinking carbonated beverages and instead has been drinking Hint Water, which seems to have alleviated some of her throat pain  Patient also reports she has been noticing increased difficultly breathing due to current weather conditions, but does report relief with albuterol use as prescribed  Patient denies any other problems or concerns at this time       Patient Care Team:  Karely Ma as PCP - General (Family Medicine)  Cheko Gray MD as PCP - [address] [place] [address] (RTE)  Madonna Garcia MD (Oncology)  Michael Lopez MD (Radiation Oncology)  [facility] (Ophthalmology)    Review of Systems   Respiratory: Positive for shortness of breath  All other systems reviewed and are negative  Objective:    /84 (BP Location: Left arm, Patient Position: Sitting, Cuff Size: Large)   Pulse 67   Temp 97 5 °F (36 4 °C) (Temporal)   Resp 18   Ht 5' 6" (1 676 m)   Wt 64 3 kg (141 lb 12 8 oz)   SpO2 95%   BMI 22 89 kg/m²      Physical Exam  Vitals signs and nursing note reviewed  Constitutional:       General: She is not in acute distress  Appearance: Normal appearance  She is well-developed  HENT:      Head: Normocephalic and atraumatic  Eyes:      General: Lids are normal       Conjunctiva/sclera: Conjunctivae normal    Neck:      Musculoskeletal: Normal range of motion  Trachea: No tracheal deviation  Cardiovascular:      Rate and Rhythm: Normal rate and regular rhythm  Heart sounds: Normal heart sounds, S1 normal and S2 normal  No murmur  Pulmonary:      Effort: Pulmonary effort is normal  No respiratory distress  Breath sounds: Examination of the left-upper field reveals decreased breath sounds  Examination of the left-middle field reveals decreased breath sounds  Examination of the left-lower field reveals decreased breath sounds  Decreased breath sounds present  Abdominal:      General: There is no distension  Tenderness: There is no guarding  Musculoskeletal: Normal range of motion  Skin:     General: Skin is warm and dry  Neurological:      Mental Status: She is alert and oriented to person, place, and time     Psychiatric:         Speech: Speech normal

## 2020-08-06 ENCOUNTER — TELEMEDICINE (OUTPATIENT)
Dept: FAMILY MEDICINE CLINIC | Facility: CLINIC | Age: 60
End: 2020-08-06
Payer: COMMERCIAL

## 2020-08-06 DIAGNOSIS — F51.04 CHRONIC INSOMNIA: ICD-10-CM

## 2020-08-06 DIAGNOSIS — F41.9 ANXIETY: ICD-10-CM

## 2020-08-06 DIAGNOSIS — Z63.32: Primary | ICD-10-CM

## 2020-08-06 PROCEDURE — 99442 PR PHYS/QHP TELEPHONE EVALUATION 11-20 MIN: CPT | Performed by: NURSE PRACTITIONER

## 2020-08-06 RX ORDER — HYDROXYZINE PAMOATE 25 MG/1
25 CAPSULE ORAL
Qty: 30 CAPSULE | Refills: 0 | Status: SHIPPED | OUTPATIENT
Start: 2020-08-06 | End: 2021-02-17 | Stop reason: ALTCHOICE

## 2020-08-06 NOTE — PROGRESS NOTES
Virtual Brief Visit    Assessment/Plan:    Problem List Items Addressed This Visit        Other    Anxiety    Relevant Medications    hydrOXYzine (VISTARIL) 25 mg capsule      Other Visit Diagnoses     Family disruption due to other extended absence of family member    -  Primary    Chronic insomnia        Add Vistaril    Relevant Medications    hydrOXYzine (VISTARIL) 25 mg capsule         Reason for visit is discuss family issues  Patient states her  was recently arrested with multiple charges and is currently in skilled nursing as a result  Patient states he was 302'ed and will require inpatient psychiatric care  Patient continues to follow with oncology Dr Emery Torres, but states she has not transportation at this time for appointments  Patient states she is a "nervous wreck," and hasn't gotten more than 4 hours of sleep over the last few days  Encounter provider KIERRA Lozoya    Provider located at [address] [place] [address]    Recent Visits  Date Type Provider Dept   08/07/20 Telemedicine KIERRA Lozoya Pg   08/06/20 Telemedicine [facility], [address] recent visits within past 7 days and meeting all other requirements     Future Appointments  No visits were found meeting these conditions  Showing future appointments within next 150 days and meeting all other requirements      After connecting through telephone, the patient was identified by name and date of birth  Pastor Garibay was informed that this is a telemedicine visit and that the visit is being conducted through telephone  My office door was closed  No one else was in the room  She acknowledged consent and understanding of privacy and security of the platform  The patient has agreed to participate and understands she can discontinue the visit at any time  Patient is aware this is a billable service  Subjective    Pastor Garibay is a 64 y o  female     HPI     Past Medical History:   Diagnosis Date    [facility] [address] [facility]     Diabetes mellitus ([facility] [address] [facility]     borderline    Irregular heartbeat     Pancreatitis     Pre cancerous lesions per [facility]     Past Surgical History:   Procedure Laterality Date    BRONCHOSCOPY      CHOLECYSTECTOMY       Current Outpatient Medications   Medication Sig Dispense Refill    albuterol 2 mg/5 mL syrup Take 5 mL (2 mg total) by mouth 3 (three) times a day 240 mL 0    ALPRAZolam (XANAX) 2 MG tablet Take 1 tablet (2 mg total) by mouth 3 (three) times a day as needed for anxiety or sleep 90 tablet 0    atorvastatin (LIPITOR) 40 mg tablet Take 1 tablet (40 mg total) by mouth daily 100 tablet 3    Blood Pressure Monitoring (BLOOD PRESSURE CUFF) MISC by Does not apply route daily 1 each 0    ciprofloxacin-dexamethasone (CIPRODEX) otic suspension Administer 4 drops into both ears 2 (two) times a day for 7 days 7 5 mL 0    erythromycin (ILOTYCIN) ophthalmic ointment       famotidine (PEPCID) 40 MG tablet Take 1 tablet (40 mg total) by mouth daily at bedtime 30 tablet 0    hydrocodone-chlorpheniramine polistirex (TUSSIONEX) 10-8 mg/5 mL ER suspension Take 5 mL by mouth every 12 (twelve) hours as needed for coughMax Daily Amount: 10 mL 120 mL 0    hydrOXYzine (VISTARIL) 25 mg capsule Take 1 capsule (25 mg total) by mouth daily at bedtime 30 capsule 0    levalbuterol (XOPENEX HFA) 45 mcg/act inhaler Inhale 1-2 puffs every 4 (four) hours as needed      Magnesium 200 MG CHEW Chew 200 mg daily      Masks (FACE MASK EARLOOP-STYLE) MISC by Does not apply route 3 (three) times a day as needed (Infection prevention) 150 each 5    Misc   Devices (COMMODE BEDSIDE) MISC by Does not apply route daily as needed (shortness of breath) 1 each 0    mupirocin (BACTROBAN) 2 % nasal ointment into each nostril 2 (two) times a day 10 g 0    nicotine (NICODERM CQ) 7 mg/24hr TD 24 hr patch Place 1 patch on the skin every 24 hours 84 patch 0    nivolumab (OPDIVO) 240 mg/24 mL SOLN injection Infuse 480 mg into a venous catheter      nystatin (MYCOSTATIN) 500,000 units/5 mL suspension Swish 5 mL around mouth and swallow 4 times daily for 14 days  60 mL 0    oxyCODONE (ROXICODONE) 5 mg immediate release tablet Take 1 tablet (5 mg total) by mouth every 4 (four) hours as needed for moderate pain or severe painMax Daily Amount: 30 mg 180 tablet 0    polyethylene glycol (MIRALAX) 17 g packet Take 17 g by mouth daily   0    prednisoLONE acetate (PRED FORTE) 1 % ophthalmic suspension       predniSONE 5 mg tablet Take 15 mg by mouth daily      prochlorperazine (COMPAZINE) 10 mg tablet Take 1 tablet by mouth every 6 (six) hours as needed  3    senna-docusate sodium (SENOKOT-S) 8 6-50 mg per tablet Take 1 tablet by mouth daily at bedtime 30 tablet 5    sertraline (ZOLOFT) 100 mg tablet Take 1 5 tablets (150 mg total) by mouth daily at bedtime 150 tablet 3    SUMAtriptan (IMITREX) 50 mg tablet Take 1 tablet (50 mg total) by mouth daily as needed for migraine for up to 1 dose 30 tablet 0    temazepam (RESTORIL) 30 mg capsule Take 1 capsule (30 mg total) by mouth daily at bedtime 30 capsule 0    Thermometer MISC by Does not apply route daily 1 each 0    tiotropium (SPIRIVA) 18 mcg inhalation capsule Place 18 mcg into inhaler and inhale daily      Tiotropium Bromide Monohydrate (SPIRIVA HANDIHALER IN) Inhale 2 puffs as needed      zolpidem (AMBIEN) 10 mg tablet Take 1 tablet (10 mg total) by mouth daily at bedtime as needed for sleep 30 tablet 0     No current facility-administered medications for this visit        Allergies   Allergen Reactions    Sulfa Antibiotics Rash and Palpitations    Ephraim Flavor Hives    Antihistamine & Nasal Deconges [Fexofenadine-Pseudoephed Er]     Codeine     Lisinopril Cough    Mangifera Indica     Nsaids      daypro, orudis    can take motrin    Other      Steroid shots:  Heart race, shakes   [employer] Extract Other reaction(s): Other (See Comments)  Steroid shots:  Heart race, shakes  Mangoes: rash    Naproxen Rash    Prednisone Palpitations     Review of Systems   Psychiatric/Behavioral: Positive for sleep disturbance  The patient is nervous/anxious  All other systems reviewed and are negative  There were no vitals filed for this visit  I spent 20 minutes directly with the patient during this visit    [address] acknowledges that she has consented to an online visit or consultation  She understands that the online visit is based solely on information provided by her, and that, in the absence of a face-to-face physical evaluation by the physician, the diagnosis she receives is both limited and provisional in terms of accuracy and completeness  This is not intended to replace a full medical face-to-face evaluation by the physician  Vero Loya understands and accepts these terms

## 2020-08-07 ENCOUNTER — TELEMEDICINE (OUTPATIENT)
Dept: FAMILY MEDICINE CLINIC | Facility: CLINIC | Age: 60
End: 2020-08-07
Payer: COMMERCIAL

## 2020-08-07 DIAGNOSIS — F51.04 CHRONIC INSOMNIA: Primary | ICD-10-CM

## 2020-08-07 PROCEDURE — 99442 PR PHYS/QHP TELEPHONE EVALUATION 11-20 MIN: CPT | Performed by: NURSE PRACTITIONER

## 2020-08-07 RX ORDER — ZOLPIDEM TARTRATE 10 MG/1
10 TABLET ORAL
Qty: 30 TABLET | Refills: 0 | Status: SHIPPED | OUTPATIENT
Start: 2020-08-07 | End: 2020-09-01 | Stop reason: SDUPTHER

## 2020-08-07 NOTE — PROGRESS NOTES
Virtual Brief Visit    Assessment/Plan:    Problem List Items Addressed This Visit     None      Visit Diagnoses     Chronic insomnia    -  Primary    D/C Atarax and Trial Ambien    Relevant Medications    zolpidem (AMBIEN) 10 mg tablet          Reason for visit is anxiety  Patient states she is unable to tolerate antihistamines is requesting different medication to help her sleep  Patient states she had taken Ambien in the past, which she had done well on  Encounter provider KIERRA Lozoya    Provider located at [address]    Recent Visits  Date Type Provider Dept   08/07/20 Telemedicine KIERRA Lozoya  Todd    08/06/20 Telemedicine Ivy Fernandez, [address] recent visits within past 7 days and meeting all other requirements     Future Appointments  No visits were found meeting these conditions  Showing future appointments within next 150 days and meeting all other requirements      After connecting through telephone, the patient was identified by name and date of birth  Pastor Garibay was informed that this is a telemedicine visit and that the visit is being conducted through telephone  My office door was closed  No one else was in the room  She acknowledged consent and understanding of privacy and security of the platform  The patient has agreed to participate and understands she can discontinue the visit at any time  Patient is aware this is a billable service       Subjective    Pastor Garibay is a 64 y o  female     HPI     Past Medical History:   Diagnosis Date    [facility] [address] [facility]     Diabetes mellitus ([facility] [address] [facility]     borderline    Irregular heartbeat     Pancreatitis     Pre cancerous lesions per Delia Bowser     Past Surgical History:   Procedure Laterality Date    BRONCHOSCOPY      CHOLECYSTECTOMY       Current Outpatient Medications   Medication Sig Dispense Refill    albuterol 2 mg/5 mL syrup Take 5 mL (2 mg total) by mouth 3 (three) times a day 240 mL 0    ALPRAZolam (XANAX) 2 MG tablet Take 1 tablet (2 mg total) by mouth 3 (three) times a day as needed for anxiety or sleep 90 tablet 0    atorvastatin (LIPITOR) 40 mg tablet Take 1 tablet (40 mg total) by mouth daily 100 tablet 3    Blood Pressure Monitoring (BLOOD PRESSURE CUFF) MISC by Does not apply route daily 1 each 0    ciprofloxacin-dexamethasone (CIPRODEX) otic suspension Administer 4 drops into both ears 2 (two) times a day for 7 days 7 5 mL 0    erythromycin (ILOTYCIN) ophthalmic ointment       famotidine (PEPCID) 40 MG tablet Take 1 tablet (40 mg total) by mouth daily at bedtime 30 tablet 0    hydrocodone-chlorpheniramine polistirex (TUSSIONEX) 10-8 mg/5 mL ER suspension Take 5 mL by mouth every 12 (twelve) hours as needed for coughMax Daily Amount: 10 mL 120 mL 0    hydrOXYzine (VISTARIL) 25 mg capsule Take 1 capsule (25 mg total) by mouth daily at bedtime 30 capsule 0    levalbuterol (XOPENEX HFA) 45 mcg/act inhaler Inhale 1-2 puffs every 4 (four) hours as needed      Magnesium 200 MG CHEW Chew 200 mg daily      Masks (FACE MASK EARLOOP-STYLE) MISC by Does not apply route 3 (three) times a day as needed (Infection prevention) 150 each 5    Misc  Devices (COMMODE BEDSIDE) MISC by Does not apply route daily as needed (shortness of breath) 1 each 0    mupirocin (BACTROBAN) 2 % nasal ointment into each nostril 2 (two) times a day 10 g 0    nicotine (NICODERM CQ) 7 mg/24hr TD 24 hr patch Place 1 patch on the skin every 24 hours 84 patch 0    nivolumab (OPDIVO) 240 mg/24 mL SOLN injection Infuse 480 mg into a venous catheter      nystatin (MYCOSTATIN) 500,000 units/5 mL suspension Swish 5 mL around mouth and swallow 4 times daily for 14 days   60 mL 0    oxyCODONE (ROXICODONE) 5 mg immediate release tablet Take 1 tablet (5 mg total) by mouth every 4 (four) hours as needed for moderate pain or severe painMax Daily Amount: 30 mg 180 tablet 0    polyethylene glycol (MIRALAX) 17 g packet Take 17 g by mouth daily   0    prednisoLONE acetate (PRED FORTE) 1 % ophthalmic suspension       predniSONE 5 mg tablet Take 15 mg by mouth daily      prochlorperazine (COMPAZINE) 10 mg tablet Take 1 tablet by mouth every 6 (six) hours as needed  3    senna-docusate sodium (SENOKOT-S) 8 6-50 mg per tablet Take 1 tablet by mouth daily at bedtime 30 tablet 5    sertraline (ZOLOFT) 100 mg tablet Take 1 5 tablets (150 mg total) by mouth daily at bedtime 150 tablet 3    SUMAtriptan (IMITREX) 50 mg tablet Take 1 tablet (50 mg total) by mouth daily as needed for migraine for up to 1 dose 30 tablet 0    temazepam (RESTORIL) 30 mg capsule Take 1 capsule (30 mg total) by mouth daily at bedtime 30 capsule 0    Thermometer MISC by Does not apply route daily 1 each 0    tiotropium (SPIRIVA) 18 mcg inhalation capsule Place 18 mcg into inhaler and inhale daily      Tiotropium Bromide Monohydrate (SPIRIVA HANDIHALER IN) Inhale 2 puffs as needed      zolpidem (AMBIEN) 10 mg tablet Take 1 tablet (10 mg total) by mouth daily at bedtime as needed for sleep 30 tablet 0     No current facility-administered medications for this visit  Allergies   Allergen Reactions    Sulfa Antibiotics Rash and Palpitations    [place] Flavor Hives    Antihistamine & Nasal Deconges [Fexofenadine-Pseudoephed Er]     Codeine     Lisinopril Cough    Mangifera Indica     Nsaids      daypro, orudis    can take motrin    Other      Steroid shots:  Heart race, shakes    Poison Ivy Extract      Other reaction(s): Other (See Comments)  Steroid shots:  Heart race, shakes  Mangoes: rash    Naproxen Rash    Prednisone Palpitations     Review of Systems   Psychiatric/Behavioral: Positive for sleep disturbance  The patient is nervous/anxious  All other systems reviewed and are negative  There were no vitals filed for this visit      I spent 20 minutes directly with the patient during this visit    [address] [facility] acknowledges that she has consented to an online visit or consultation  She understands that the online visit is based solely on information provided by her, and that, in the absence of a face-to-face physical evaluation by the physician, the diagnosis she receives is both limited and provisional in terms of accuracy and completeness  This is not intended to replace a full medical face-to-face evaluation by the physician  Christina Rehman understands and accepts these terms

## 2020-08-11 ENCOUNTER — TELEMEDICINE (OUTPATIENT)
Dept: FAMILY MEDICINE CLINIC | Facility: CLINIC | Age: 60
End: 2020-08-11
Payer: COMMERCIAL

## 2020-08-11 DIAGNOSIS — F41.9 ANXIETY: ICD-10-CM

## 2020-08-11 DIAGNOSIS — Z63.32: Primary | ICD-10-CM

## 2020-08-11 DIAGNOSIS — F51.04 CHRONIC INSOMNIA: ICD-10-CM

## 2020-08-11 PROCEDURE — 99443 PR PHYS/QHP TELEPHONE EVALUATION 21-30 MIN: CPT | Performed by: NURSE PRACTITIONER

## 2020-08-11 NOTE — PROGRESS NOTES
Virtual Brief Visit    Assessment/Plan:    Problem List Items Addressed This Visit        Other    Anxiety    Chronic insomnia      Other Visit Diagnoses     Family disruption due to other extended absence of family member    -  Primary          Reason for visit is follow up  Patient states she continues to struggle with sleep  Patient is requesting documentation regarding her controlled substance prescriptions be forwarded to Starr Hernandez, who is currently handling her 's case  Encounter provider KIERRA Tenorio    Provider located at [address] [place] [address]    Recent Visits  Date Type Provider Dept   08/07/20 Telemedicine KIERRA Tenorio Pg   08/06/20 Telemedicine Cynthia Barraza, [address] [facility] recent visits within past 7 days and meeting all other requirements     Today's Visits  Date Type Provider Dept   08/11/20 Telemedicine KIERRA De Oliveira Pg   Showing today's visits and meeting all other requirements     Future Appointments  No visits were found meeting these conditions  Showing future appointments within next 150 days and meeting all other requirements      After connecting through telephone, the patient was identified by name and date of birth  Opal Albarran was informed that this is a telemedicine visit and that the visit is being conducted through telephone  My office door was closed  No one else was in the room  She acknowledged consent and understanding of privacy and security of the platform  The patient has agreed to participate and understands she can discontinue the visit at any time  Patient is aware this is a billable service       Subjective    Opal Albarran is a 64 y o  female     HPI     Past Medical History:   Diagnosis Date    [facility] [address]     Diabetes mellitus ([facility] [address]     borderline    Irregular heartbeat     Pancreatitis     Pre cancerous lesions per Guille Bowman     Past Surgical History:   Procedure Laterality Date    BRONCHOSCOPY      CHOLECYSTECTOMY       Current Outpatient Medications   Medication Sig Dispense Refill    albuterol 2 mg/5 mL syrup Take 5 mL (2 mg total) by mouth 3 (three) times a day 240 mL 0    ALPRAZolam (XANAX) 2 MG tablet Take 1 tablet (2 mg total) by mouth 3 (three) times a day as needed for anxiety or sleep 90 tablet 0    atorvastatin (LIPITOR) 40 mg tablet Take 1 tablet (40 mg total) by mouth daily 100 tablet 3    Blood Pressure Monitoring (BLOOD PRESSURE CUFF) MISC by Does not apply route daily 1 each 0    ciprofloxacin-dexamethasone (CIPRODEX) otic suspension Administer 4 drops into both ears 2 (two) times a day for 7 days 7 5 mL 0    erythromycin (ILOTYCIN) ophthalmic ointment       famotidine (PEPCID) 40 MG tablet Take 1 tablet (40 mg total) by mouth daily at bedtime 30 tablet 0    hydrocodone-chlorpheniramine polistirex (TUSSIONEX) 10-8 mg/5 mL ER suspension Take 5 mL by mouth every 12 (twelve) hours as needed for coughMax Daily Amount: 10 mL 120 mL 0    hydrOXYzine (VISTARIL) 25 mg capsule Take 1 capsule (25 mg total) by mouth daily at bedtime 30 capsule 0    levalbuterol (XOPENEX HFA) 45 mcg/act inhaler Inhale 1-2 puffs every 4 (four) hours as needed      Magnesium 200 MG CHEW Chew 200 mg daily      Masks (FACE MASK EARLOOP-STYLE) MISC by Does not apply route 3 (three) times a day as needed (Infection prevention) 150 each 5    Misc   Devices (COMMODE BEDSIDE) MISC by Does not apply route daily as needed (shortness of breath) 1 each 0    mupirocin (BACTROBAN) 2 % nasal ointment into each nostril 2 (two) times a day 10 g 0    nicotine (NICODERM CQ) 7 mg/24hr TD 24 hr patch Place 1 patch on the skin every 24 hours 84 patch 0    nivolumab (OPDIVO) 240 mg/24 mL SOLN injection Infuse 480 mg into a venous catheter      nystatin (MYCOSTATIN) 500,000 units/5 mL suspension Swish 5 mL around mouth and swallow 4 times daily for 14 days  60 mL 0    oxyCODONE (ROXICODONE) 5 mg immediate release tablet Take 1 tablet (5 mg total) by mouth every 4 (four) hours as needed for moderate pain or severe painMax Daily Amount: 30 mg 180 tablet 0    polyethylene glycol (MIRALAX) 17 g packet Take 17 g by mouth daily   0    prednisoLONE acetate (PRED FORTE) 1 % ophthalmic suspension       predniSONE 5 mg tablet Take 15 mg by mouth daily      prochlorperazine (COMPAZINE) 10 mg tablet Take 1 tablet by mouth every 6 (six) hours as needed  3    senna-docusate sodium (SENOKOT-S) 8 6-50 mg per tablet Take 1 tablet by mouth daily at bedtime 30 tablet 5    sertraline (ZOLOFT) 100 mg tablet Take 1 5 tablets (150 mg total) by mouth daily at bedtime 150 tablet 3    SUMAtriptan (IMITREX) 50 mg tablet Take 1 tablet (50 mg total) by mouth daily as needed for migraine for up to 1 dose 30 tablet 0    temazepam (RESTORIL) 30 mg capsule Take 1 capsule (30 mg total) by mouth daily at bedtime 30 capsule 0    Thermometer MISC by Does not apply route daily 1 each 0    tiotropium (SPIRIVA) 18 mcg inhalation capsule Place 18 mcg into inhaler and inhale daily      Tiotropium Bromide Monohydrate (SPIRIVA HANDIHALER IN) Inhale 2 puffs as needed      zolpidem (AMBIEN) 10 mg tablet Take 1 tablet (10 mg total) by mouth daily at bedtime as needed for sleep 30 tablet 0     No current facility-administered medications for this visit  Allergies   Allergen Reactions    Sulfa Antibiotics Rash and Palpitations    Ephraim Flavor Hives    Antihistamine & Nasal Deconges [Fexofenadine-Pseudoephed Er]     Codeine     Lisinopril Cough    Mangifera Indica     Nsaids      daypro, orudis    can take motrin    Other      Steroid shots:  Heart race, shakes    Poison Ivy Extract      Other reaction(s):  Other (See Comments)  Steroid shots:  Heart race, shakes  Mangoes: rash    Naproxen Rash    Prednisone Palpitations     Review of Systems   Psychiatric/Behavioral: Positive for sleep disturbance  The patient is nervous/anxious  All other systems reviewed and are negative  There were no vitals filed for this visit  I spent 30 minutes directly with the patient during this visit    [facility] acknowledges that she has consented to an online visit or consultation  She understands that the online visit is based solely on information provided by her, and that, in the absence of a face-to-face physical evaluation by the physician, the diagnosis she receives is both limited and provisional in terms of accuracy and completeness  This is not intended to replace a full medical face-to-face evaluation by the physician  Rosalynd Schwab understands and accepts these terms

## 2020-08-11 NOTE — LETTER
August 11, 2020     Patient: Julianne Edmonds   YOB: 1960   Date of Visit: 8/11/2020       To Whom it May Concern:    Vaishnavi Figueroa is under my professional care  She is prescribed the following controlled substances by this office:    Oxycodone 5 mg tablets  Alprazolam 2 mg tablets  Temazepam 30 mg capsules  Zolpidem 10 mg tablets    If you have any questions or concerns, please don't hesitate to call           Sincerely,          KIERRA De Oliveira        CC: No Recipients

## 2020-09-01 ENCOUNTER — OFFICE VISIT (OUTPATIENT)
Dept: FAMILY MEDICINE CLINIC | Facility: CLINIC | Age: 60
End: 2020-09-01
Payer: COMMERCIAL

## 2020-09-01 VITALS
DIASTOLIC BLOOD PRESSURE: 82 MMHG | SYSTOLIC BLOOD PRESSURE: 122 MMHG | HEART RATE: 84 BPM | HEIGHT: 66 IN | BODY MASS INDEX: 21.86 KG/M2 | WEIGHT: 136 LBS | OXYGEN SATURATION: 95 % | TEMPERATURE: 98.2 F

## 2020-09-01 DIAGNOSIS — E83.41 HYPERMAGNESEMIA: ICD-10-CM

## 2020-09-01 DIAGNOSIS — J43.1 PANLOBULAR EMPHYSEMA (HCC): ICD-10-CM

## 2020-09-01 DIAGNOSIS — Z63.79 STRESS DUE TO ILLNESS OF FAMILY MEMBER: ICD-10-CM

## 2020-09-01 DIAGNOSIS — M79.7 FIBROMYALGIA: ICD-10-CM

## 2020-09-01 DIAGNOSIS — C34.91 NON-SMALL CELL CANCER OF RIGHT LUNG (HCC): ICD-10-CM

## 2020-09-01 DIAGNOSIS — M51.37 DEGENERATION OF LUMBOSACRAL INTERVERTEBRAL DISC: ICD-10-CM

## 2020-09-01 DIAGNOSIS — F41.9 ANXIETY: ICD-10-CM

## 2020-09-01 DIAGNOSIS — E78.2 MIXED HYPERLIPIDEMIA: ICD-10-CM

## 2020-09-01 DIAGNOSIS — C34.91 ADENOSQUAMOUS CARCINOMA OF RIGHT LUNG (HCC): ICD-10-CM

## 2020-09-01 DIAGNOSIS — F51.04 CHRONIC INSOMNIA: ICD-10-CM

## 2020-09-01 DIAGNOSIS — F41.8 DEPRESSION WITH ANXIETY: Primary | ICD-10-CM

## 2020-09-01 PROCEDURE — 99214 OFFICE O/P EST MOD 30 MIN: CPT | Performed by: NURSE PRACTITIONER

## 2020-09-01 RX ORDER — ALPRAZOLAM 2 MG/1
2 TABLET ORAL 3 TIMES DAILY PRN
Qty: 90 TABLET | Refills: 0 | Status: SHIPPED | OUTPATIENT
Start: 2020-09-01 | End: 2020-10-01

## 2020-09-01 RX ORDER — ZOLPIDEM TARTRATE 10 MG/1
10 TABLET ORAL
Qty: 30 TABLET | Refills: 0 | Status: SHIPPED | OUTPATIENT
Start: 2020-09-01 | End: 2020-10-01 | Stop reason: DRUGHIGH

## 2020-09-01 RX ORDER — ATORVASTATIN CALCIUM 10 MG/1
10 TABLET, FILM COATED ORAL DAILY
Qty: 30 TABLET | Refills: 5 | Status: SHIPPED | OUTPATIENT
Start: 2020-09-01 | End: 2021-03-03 | Stop reason: SDUPTHER

## 2020-09-01 RX ORDER — TEMAZEPAM 30 MG/1
30 CAPSULE ORAL
Qty: 30 CAPSULE | Refills: 0 | Status: SHIPPED | OUTPATIENT
Start: 2020-09-01 | End: 2020-10-29 | Stop reason: SDUPTHER

## 2020-09-01 RX ORDER — LEVALBUTEROL TARTRATE 45 UG/1
1-2 AEROSOL, METERED ORAL EVERY 4 HOURS PRN
Qty: 1 INHALER | Refills: 5 | Status: SHIPPED | OUTPATIENT
Start: 2020-09-01 | End: 2021-03-04 | Stop reason: SDUPTHER

## 2020-09-01 RX ORDER — OXYCODONE HYDROCHLORIDE 5 MG/1
5 TABLET ORAL EVERY 4 HOURS PRN
Qty: 180 TABLET | Refills: 0 | Status: SHIPPED | OUTPATIENT
Start: 2020-09-01 | End: 2020-10-01 | Stop reason: SDUPTHER

## 2020-09-01 NOTE — PROGRESS NOTES
Assessment/Plan:     Diagnoses and all orders for this visit:    Depression with anxiety  -     Ambulatory referral to [facility]; Future    Stress due to illness of family member  -     Ambulatory referral to [address] [place] [address]; Future    Non-small cell cancer of right lung [facility])  -     Ambulatory Referral to Home Health; Future  -     oxyCODONE (ROXICODONE) 5 mg immediate release tablet; Take 1 tablet (5 mg total) by mouth every 4 (four) hours as needed for moderate pain or severe painMax Daily Amount: 30 mg    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[address] )  -     Ambulatory Referral to [address] [place] [address]; Future  -     oxyCODONE (ROXICODONE) 5 mg immediate release tablet; Take 1 tablet (5 mg total) by mouth every 4 (four) hours as needed for moderate pain or severe painMax Daily Amount: 30 mg    Degeneration of lumbosacral intervertebral disc  -     Ambulatory Referral to Home Health; Future    Fibromyalgia  -     Ambulatory Referral to Home Health; Future    Mixed hyperlipidemia  Comments:  Decrease Lipitor and recheck labs in 3 months  Orders:  -     atorvastatin (LIPITOR) 10 mg tablet; Take 1 tablet (10 mg total) by mouth daily  -     Lipid panel; Future    Anxiety  -     ALPRAZolam (XANAX) 2 MG tablet; Take 1 tablet (2 mg total) by mouth 3 (three) times a day as needed for anxiety or sleep    Chronic insomnia  -     temazepam (RESTORIL) 30 mg capsule; Take 1 capsule (30 mg total) by mouth daily at bedtime  -     zolpidem (AMBIEN) 10 mg tablet; Take 1 tablet (10 mg total) by mouth daily at bedtime as needed for sleep    Hypermagnesemia  -     Magnesium; Future    Panlobular emphysema (HCC)  -     levalbuterol (XOPENEX HFA) 45 mcg/act inhaler; Inhale 1-2 puffs every 4 (four) hours as needed for wheezing or shortness of breath        Subjective:      Patient ID: Celestine Cardoso is a 64 y o  female  Patient presents to [address] [place] [address] as follow up   Allergies, medical history and current medications reviewed with patient; patient reports taking all medications as prescribed without issues  Patient states she would like to restart Physical Therapy with [address]  Patient is also requesting referral for counseling due to increased stress  Patient also states she discussed muscle cramping pain with Oncology Dr Natalie Rasheed, who had recommended decreasing Lipitor in context of normal Magnesium and Potassium levels  Patient states she last saw Dr Natalie Rasheed on 8/24/20, who discussed her remissive status with no evidence of disease  Patient Care Team:  Aisha Gilliland as PCP - General (Family Medicine)  Estela Moss MD as PCP - [address] [place], [place] [address] ([facility])  Leonard Wright MD (Oncology)  Rodger Callaway MD (Radiation Oncology)  [facility] (Ophthalmology)    Review of Systems   Musculoskeletal: Positive for myalgias  All other systems reviewed and are negative  Objective:    /82   Pulse 84   Temp 98 2 °F (36 8 °C)   Ht 5' 6" (1 676 m)   Wt 61 7 kg (136 lb)   SpO2 95%   BMI 21 95 kg/m²      Physical Exam  Vitals signs and nursing note reviewed  Constitutional:       General: She is not in acute distress  Appearance: Normal appearance  She is well-developed  HENT:      Head: Normocephalic and atraumatic  Eyes:      General: Lids are normal       Conjunctiva/sclera: Conjunctivae normal    Neck:      Musculoskeletal: Normal range of motion  Trachea: No tracheal deviation  Cardiovascular:      Rate and Rhythm: Normal rate and regular rhythm  Heart sounds: Normal heart sounds, S1 normal and S2 normal  No murmur  Pulmonary:      Effort: Pulmonary effort is normal  No respiratory distress  Breath sounds: Normal breath sounds  Abdominal:      General: There is no distension  Tenderness: There is no guarding  Musculoskeletal: Normal range of motion  Skin:     General: Skin is warm and dry     Neurological:      Mental Status: She is alert and oriented to person, place, and time     Psychiatric:         Speech: Speech normal
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
## 2020-09-22 ENCOUNTER — SOCIAL WORK (OUTPATIENT)
Dept: BEHAVIORAL/MENTAL HEALTH CLINIC | Facility: CLINIC | Age: 60
End: 2020-09-22
Payer: COMMERCIAL

## 2020-09-22 DIAGNOSIS — F43.10 PTSD (POST-TRAUMATIC STRESS DISORDER): ICD-10-CM

## 2020-09-22 DIAGNOSIS — F33.1 MAJOR DEPRESSIVE DISORDER, RECURRENT EPISODE, MODERATE DEGREE (HCC): Primary | ICD-10-CM

## 2020-09-22 PROCEDURE — 90791 PSYCH DIAGNOSTIC EVALUATION: CPT | Performed by: SOCIAL WORKER

## 2020-09-22 PROCEDURE — 90837 PSYTX W PT 60 MINUTES: CPT | Performed by: SOCIAL WORKER

## 2020-10-01 ENCOUNTER — OFFICE VISIT (OUTPATIENT)
Dept: FAMILY MEDICINE CLINIC | Facility: CLINIC | Age: 60
End: 2020-10-01
Payer: COMMERCIAL

## 2020-10-01 VITALS
SYSTOLIC BLOOD PRESSURE: 120 MMHG | HEART RATE: 84 BPM | TEMPERATURE: 98.3 F | OXYGEN SATURATION: 95 % | WEIGHT: 133 LBS | HEIGHT: 66 IN | BODY MASS INDEX: 21.38 KG/M2 | DIASTOLIC BLOOD PRESSURE: 80 MMHG

## 2020-10-01 DIAGNOSIS — C34.91 ADENOSQUAMOUS CARCINOMA OF RIGHT LUNG (HCC): ICD-10-CM

## 2020-10-01 DIAGNOSIS — F32.2 SEVERE DEPRESSION (HCC): ICD-10-CM

## 2020-10-01 DIAGNOSIS — F41.9 ANXIETY: ICD-10-CM

## 2020-10-01 DIAGNOSIS — F51.04 CHRONIC INSOMNIA: ICD-10-CM

## 2020-10-01 DIAGNOSIS — M19.90 ARTHRITIS: ICD-10-CM

## 2020-10-01 DIAGNOSIS — Z09 FOLLOW UP: Primary | ICD-10-CM

## 2020-10-01 DIAGNOSIS — C34.91 NON-SMALL CELL CANCER OF RIGHT LUNG (HCC): ICD-10-CM

## 2020-10-01 DIAGNOSIS — F43.9 EMOTIONAL STRESS REACTION: ICD-10-CM

## 2020-10-01 PROBLEM — F43.10 PTSD (POST-TRAUMATIC STRESS DISORDER): Status: ACTIVE | Noted: 2020-10-01

## 2020-10-01 PROCEDURE — 99213 OFFICE O/P EST LOW 20 MIN: CPT | Performed by: NURSE PRACTITIONER

## 2020-10-01 PROCEDURE — 1036F TOBACCO NON-USER: CPT | Performed by: NURSE PRACTITIONER

## 2020-10-01 RX ORDER — ALPRAZOLAM 2 MG/1
2 TABLET ORAL 3 TIMES DAILY PRN
Qty: 90 TABLET | Refills: 0 | Status: SHIPPED | OUTPATIENT
Start: 2020-10-01 | End: 2020-10-29 | Stop reason: SDUPTHER

## 2020-10-01 RX ORDER — OXYCODONE HYDROCHLORIDE 5 MG/1
5 TABLET ORAL EVERY 4 HOURS PRN
Qty: 180 TABLET | Refills: 0 | Status: SHIPPED | OUTPATIENT
Start: 2020-10-01 | End: 2020-10-29 | Stop reason: SDUPTHER

## 2020-10-01 RX ORDER — ZOLPIDEM TARTRATE 12.5 MG/1
12.5 TABLET, FILM COATED, EXTENDED RELEASE ORAL
Qty: 30 TABLET | Refills: 0 | Status: SHIPPED | OUTPATIENT
Start: 2020-10-01 | End: 2020-10-29 | Stop reason: SINTOL

## 2020-10-09 ENCOUNTER — SOCIAL WORK (OUTPATIENT)
Dept: BEHAVIORAL/MENTAL HEALTH CLINIC | Facility: CLINIC | Age: 60
End: 2020-10-09
Payer: COMMERCIAL

## 2020-10-09 DIAGNOSIS — F43.10 PTSD (POST-TRAUMATIC STRESS DISORDER): ICD-10-CM

## 2020-10-09 DIAGNOSIS — F33.1 MAJOR DEPRESSIVE DISORDER, RECURRENT EPISODE, MODERATE DEGREE (HCC): Primary | ICD-10-CM

## 2020-10-09 PROCEDURE — 90837 PSYTX W PT 60 MINUTES: CPT | Performed by: SOCIAL WORKER

## 2020-10-29 ENCOUNTER — OFFICE VISIT (OUTPATIENT)
Dept: FAMILY MEDICINE CLINIC | Facility: CLINIC | Age: 60
End: 2020-10-29
Payer: COMMERCIAL

## 2020-10-29 VITALS
HEART RATE: 84 BPM | OXYGEN SATURATION: 95 % | BODY MASS INDEX: 21.38 KG/M2 | TEMPERATURE: 97.1 F | DIASTOLIC BLOOD PRESSURE: 82 MMHG | WEIGHT: 133 LBS | HEIGHT: 66 IN | SYSTOLIC BLOOD PRESSURE: 120 MMHG

## 2020-10-29 DIAGNOSIS — C34.91 ADENOSQUAMOUS CARCINOMA OF RIGHT LUNG (HCC): ICD-10-CM

## 2020-10-29 DIAGNOSIS — F51.04 CHRONIC INSOMNIA: ICD-10-CM

## 2020-10-29 DIAGNOSIS — F41.9 ANXIETY: ICD-10-CM

## 2020-10-29 DIAGNOSIS — Z09 FOLLOW UP: Primary | ICD-10-CM

## 2020-10-29 DIAGNOSIS — C34.91 NON-SMALL CELL CANCER OF RIGHT LUNG (HCC): ICD-10-CM

## 2020-10-29 DIAGNOSIS — G43.111 INTRACTABLE MIGRAINE WITH AURA WITH STATUS MIGRAINOSUS: ICD-10-CM

## 2020-10-29 PROCEDURE — 99214 OFFICE O/P EST MOD 30 MIN: CPT | Performed by: NURSE PRACTITIONER

## 2020-10-29 RX ORDER — TEMAZEPAM 30 MG/1
30 CAPSULE ORAL
Qty: 30 CAPSULE | Refills: 0 | Status: SHIPPED | OUTPATIENT
Start: 2020-10-29 | End: 2020-11-24 | Stop reason: SDUPTHER

## 2020-10-29 RX ORDER — OXYCODONE HYDROCHLORIDE 5 MG/1
5 TABLET ORAL EVERY 4 HOURS PRN
Qty: 180 TABLET | Refills: 0 | Status: SHIPPED | OUTPATIENT
Start: 2020-10-29 | End: 2020-11-24 | Stop reason: SDUPTHER

## 2020-10-29 RX ORDER — ALPRAZOLAM 2 MG/1
2 TABLET ORAL 3 TIMES DAILY PRN
Qty: 90 TABLET | Refills: 0 | Status: SHIPPED | OUTPATIENT
Start: 2020-10-29 | End: 2020-11-24 | Stop reason: SDUPTHER

## 2020-11-12 DIAGNOSIS — R19.7 DIARRHEA, UNSPECIFIED TYPE: Primary | ICD-10-CM

## 2020-11-12 RX ORDER — DIPHENOXYLATE HYDROCHLORIDE AND ATROPINE SULFATE 2.5; .025 MG/1; MG/1
TABLET ORAL
Qty: 90 TABLET | Refills: 3 | Status: SHIPPED | OUTPATIENT
Start: 2020-11-12 | End: 2021-04-27

## 2020-11-24 ENCOUNTER — OFFICE VISIT (OUTPATIENT)
Dept: FAMILY MEDICINE CLINIC | Facility: CLINIC | Age: 60
End: 2020-11-24
Payer: COMMERCIAL

## 2020-11-24 VITALS
HEIGHT: 66 IN | SYSTOLIC BLOOD PRESSURE: 122 MMHG | TEMPERATURE: 97.1 F | OXYGEN SATURATION: 95 % | DIASTOLIC BLOOD PRESSURE: 80 MMHG | WEIGHT: 138 LBS | BODY MASS INDEX: 22.18 KG/M2 | HEART RATE: 85 BPM

## 2020-11-24 DIAGNOSIS — C34.91 NON-SMALL CELL CANCER OF RIGHT LUNG (HCC): ICD-10-CM

## 2020-11-24 DIAGNOSIS — J39.2 THROAT IRRITATION: ICD-10-CM

## 2020-11-24 DIAGNOSIS — C34.91 ADENOSQUAMOUS CARCINOMA OF RIGHT LUNG (HCC): ICD-10-CM

## 2020-11-24 DIAGNOSIS — F41.9 ANXIETY: ICD-10-CM

## 2020-11-24 DIAGNOSIS — F51.04 CHRONIC INSOMNIA: ICD-10-CM

## 2020-11-24 DIAGNOSIS — Z00.00 ROUTINE CHECK-UP: Primary | ICD-10-CM

## 2020-11-24 PROCEDURE — 3008F BODY MASS INDEX DOCD: CPT | Performed by: NURSE PRACTITIONER

## 2020-11-24 PROCEDURE — 1036F TOBACCO NON-USER: CPT | Performed by: NURSE PRACTITIONER

## 2020-11-24 PROCEDURE — 99213 OFFICE O/P EST LOW 20 MIN: CPT | Performed by: NURSE PRACTITIONER

## 2020-11-24 RX ORDER — ATORVASTATIN CALCIUM 40 MG/1
TABLET, FILM COATED ORAL
COMMUNITY
Start: 2020-11-19 | End: 2021-03-03 | Stop reason: SDUPTHER

## 2020-11-24 RX ORDER — ALPRAZOLAM 2 MG/1
2 TABLET ORAL 3 TIMES DAILY PRN
Qty: 90 TABLET | Refills: 0 | Status: SHIPPED | OUTPATIENT
Start: 2020-11-24 | End: 2020-12-22 | Stop reason: SDUPTHER

## 2020-11-24 RX ORDER — OXYCODONE HYDROCHLORIDE 5 MG/1
5 TABLET ORAL EVERY 4 HOURS PRN
Qty: 180 TABLET | Refills: 0 | Status: SHIPPED | OUTPATIENT
Start: 2020-11-24 | End: 2020-12-22 | Stop reason: SDUPTHER

## 2020-11-24 RX ORDER — TEMAZEPAM 30 MG/1
30 CAPSULE ORAL
Qty: 30 CAPSULE | Refills: 0 | Status: SHIPPED | OUTPATIENT
Start: 2020-11-24 | End: 2020-12-22 | Stop reason: SDUPTHER

## 2020-11-27 ENCOUNTER — TELEPHONE (OUTPATIENT)
Dept: FAMILY MEDICINE CLINIC | Facility: CLINIC | Age: 60
End: 2020-11-27

## 2020-11-27 DIAGNOSIS — J32.9 SINUSITIS, UNSPECIFIED CHRONICITY, UNSPECIFIED LOCATION: Primary | ICD-10-CM

## 2020-11-27 RX ORDER — AMOXICILLIN 500 MG/1
500 CAPSULE ORAL EVERY 8 HOURS SCHEDULED
Qty: 21 CAPSULE | Refills: 0 | Status: SHIPPED | OUTPATIENT
Start: 2020-11-27 | End: 2020-12-04

## 2020-12-22 DIAGNOSIS — F51.04 CHRONIC INSOMNIA: ICD-10-CM

## 2020-12-22 DIAGNOSIS — F41.9 ANXIETY: ICD-10-CM

## 2020-12-22 DIAGNOSIS — C34.91 NON-SMALL CELL CANCER OF RIGHT LUNG (HCC): ICD-10-CM

## 2020-12-22 DIAGNOSIS — C34.91 ADENOSQUAMOUS CARCINOMA OF RIGHT LUNG (HCC): ICD-10-CM

## 2020-12-22 RX ORDER — OXYCODONE HYDROCHLORIDE 5 MG/1
5 TABLET ORAL EVERY 4 HOURS PRN
Qty: 180 TABLET | Refills: 0 | Status: SHIPPED | OUTPATIENT
Start: 2020-12-22 | End: 2021-01-20 | Stop reason: SDUPTHER

## 2020-12-22 RX ORDER — TEMAZEPAM 30 MG/1
30 CAPSULE ORAL
Qty: 30 CAPSULE | Refills: 0 | Status: SHIPPED | OUTPATIENT
Start: 2020-12-22 | End: 2021-01-20 | Stop reason: SDUPTHER

## 2020-12-22 RX ORDER — ALPRAZOLAM 2 MG/1
2 TABLET ORAL 3 TIMES DAILY PRN
Qty: 90 TABLET | Refills: 0 | Status: SHIPPED | OUTPATIENT
Start: 2020-12-22 | End: 2021-01-07 | Stop reason: ALTCHOICE

## 2020-12-29 ENCOUNTER — TELEPHONE (OUTPATIENT)
Dept: FAMILY MEDICINE CLINIC | Facility: CLINIC | Age: 60
End: 2020-12-29

## 2020-12-29 DIAGNOSIS — C34.91 ADENOSQUAMOUS CARCINOMA OF RIGHT LUNG (HCC): ICD-10-CM

## 2020-12-29 DIAGNOSIS — C34.91 NON-SMALL CELL CANCER OF RIGHT LUNG (HCC): ICD-10-CM

## 2020-12-29 DIAGNOSIS — R05.3 CHRONIC COUGH: ICD-10-CM

## 2020-12-29 RX ORDER — HYDROCODONE POLISTIREX AND CHLORPHENIRAMINE POLISTIREX 10; 8 MG/5ML; MG/5ML
5 SUSPENSION, EXTENDED RELEASE ORAL EVERY 12 HOURS PRN
Qty: 120 ML | Refills: 0 | Status: SHIPPED | OUTPATIENT
Start: 2020-12-29 | End: 2021-04-27

## 2021-01-07 ENCOUNTER — OFFICE VISIT (OUTPATIENT)
Dept: FAMILY MEDICINE CLINIC | Facility: CLINIC | Age: 61
End: 2021-01-07
Payer: COMMERCIAL

## 2021-01-07 DIAGNOSIS — F41.9 ANXIETY: Primary | ICD-10-CM

## 2021-01-07 PROCEDURE — 99213 OFFICE O/P EST LOW 20 MIN: CPT | Performed by: NURSE PRACTITIONER

## 2021-01-07 RX ORDER — LORAZEPAM 1 MG/1
1 TABLET ORAL EVERY 8 HOURS PRN
Qty: 90 TABLET | Refills: 0 | Status: SHIPPED | OUTPATIENT
Start: 2021-01-07 | End: 2021-01-12 | Stop reason: ALTCHOICE

## 2021-01-07 NOTE — PROGRESS NOTES
Virtual Regular Visit    Assessment/Plan:    Problem List Items Addressed This Visit        Other    Anxiety - Primary    Relevant Medications    LORazepam (ATIVAN) 1 mg tablet         Reason for visit is follow up  Patient reports she continues to struggle with insomnia, and is interested in trying Ativan instead of Xanax  Patient states she had been on Sonata in the past, but would like to try the Ativan before restarting Sonata  Patient also reports she is scheduled with ENT in [facility] for further evaluation of lesion next week  Encounter provider KIERRA Haile    Provider located at [address] [place], [place] [address]     Recent Visits  No visits were found meeting these conditions  Showing recent visits within past 7 days and meeting all other requirements     Today's Visits  Date Type Provider Dept   01/07/21 Office Visit KIERRA De Oliveira  Mike Chang   Showing today's visits and meeting all other requirements     Future Appointments  No visits were found meeting these conditions  Showing future appointments within next 150 days and meeting all other requirements      The patient was identified by name and date of birth  Jacki Feldman was informed that this is a telemedicine visit and that the visit is being conducted through [employer] and patient was informed that this is not a secure, HIPAA-compliant platform  She agrees to proceed     My office door was closed  No one else was in the room  She acknowledged consent and understanding of privacy and security of the video platform  The patient has agreed to participate and understands they can discontinue the visit at any time  Patient is aware this is a billable service       Subjective    Jacki Feldman is a 64 y o  female    HPI     Past Medical History:   Diagnosis Date    Cancer [facility] )     Diabetes mellitus ([facility] )     borderline    Irregular heartbeat     Pancreatitis     Pre cancerous lesions per [facility]     Past Surgical History:   Procedure Laterality Date    BRONCHOSCOPY      CHOLECYSTECTOMY       Current Outpatient Medications   Medication Sig Dispense Refill    albuterol 2 mg/5 mL syrup Take 5 mL (2 mg total) by mouth 3 (three) times a day 240 mL 0    atorvastatin (LIPITOR) 10 mg tablet Take 1 tablet (10 mg total) by mouth daily 30 tablet 5    atorvastatin (LIPITOR) 40 mg tablet       Blood Pressure Monitoring (BLOOD PRESSURE CUFF) MISC by Does not apply route daily (Patient not taking: Reported on 10/29/2020) 1 each 0    ciprofloxacin-dexamethasone (CIPRODEX) otic suspension Administer 4 drops into both ears 2 (two) times a day for 7 days 7 5 mL 0    diphenoxylate-atropine (LOMOTIL) 2 5-0 025 mg per tablet TAKE 1 TABLET BY MOUTH 4 (FOUR) TIMES A DAY AS NEEDED FOR DIARRHEA  90 tablet 3    erythromycin (ILOTYCIN) ophthalmic ointment       famotidine (PEPCID) 40 MG tablet Take 1 tablet (40 mg total) by mouth daily at bedtime 30 tablet 0    hydrocodone-chlorpheniramine polistirex (TUSSIONEX) 10-8 mg/5 mL ER suspension Take 5 mL by mouth every 12 (twelve) hours as needed for coughMax Daily Amount: 10 mL 120 mL 0    hydrOXYzine (VISTARIL) 25 mg capsule Take 1 capsule (25 mg total) by mouth daily at bedtime 30 capsule 0    levalbuterol (XOPENEX HFA) 45 mcg/act inhaler Inhale 1-2 puffs every 4 (four) hours as needed for wheezing or shortness of breath 1 Inhaler 5    LORazepam (ATIVAN) 1 mg tablet Take 1 tablet (1 mg total) by mouth every 8 (eight) hours as needed for anxiety 90 tablet 0    Magnesium 200 MG CHEW Chew 200 mg daily      Masks (FACE MASK EARLOOP-STYLE) MISC by Does not apply route 3 (three) times a day as needed (Infection prevention) (Patient not taking: Reported on 10/1/2020) 150 each 5    Misc   Devices (COMMODE BEDSIDE) MISC by Does not apply route daily as needed (shortness of breath) (Patient not taking: Reported on 10/29/2020) 1 each 0    mupirocin (BACTROBAN) 2 % nasal ointment into each nostril 2 (two) times a day (Patient not taking: Reported on 11/24/2020) 10 g 0    nicotine (NICODERM CQ) 7 mg/24hr TD 24 hr patch Place 1 patch on the skin every 24 hours (Patient not taking: Reported on 11/24/2020) 84 patch 0    nivolumab (OPDIVO) 240 mg/24 mL SOLN injection Infuse 480 mg into a venous catheter      nystatin (MYCOSTATIN) 500,000 units/5 mL suspension Swish 5 mL around mouth and swallow 4 times daily for 14 days  60 mL 0    oxyCODONE (ROXICODONE) 5 mg immediate release tablet Take 1 tablet (5 mg total) by mouth every 4 (four) hours as needed for moderate pain or severe painMax Daily Amount: 30 mg 180 tablet 0    polyethylene glycol (MIRALAX) 17 g packet Take 17 g by mouth daily   0    prednisoLONE acetate (PRED FORTE) 1 % ophthalmic suspension       predniSONE 5 mg tablet Take 15 mg by mouth daily      prochlorperazine (COMPAZINE) 10 mg tablet Take 1 tablet by mouth every 6 (six) hours as needed  3    senna-docusate sodium (SENOKOT-S) 8 6-50 mg per tablet Take 1 tablet by mouth daily at bedtime 30 tablet 5    sertraline (ZOLOFT) 100 mg tablet Take 1 5 tablets (150 mg total) by mouth daily at bedtime 150 tablet 3    SUMAtriptan (IMITREX) 50 mg tablet Take 1 tablet (50 mg total) by mouth daily as needed for migraine for up to 1 dose 30 tablet 0    temazepam (RESTORIL) 30 mg capsule Take 1 capsule (30 mg total) by mouth daily at bedtime 30 capsule 0    Thermometer MISC by Does not apply route daily 1 each 0    tiotropium (SPIRIVA) 18 mcg inhalation capsule Place 18 mcg into inhaler and inhale daily      Tiotropium Bromide Monohydrate (SPIRIVA HANDIHALER IN) Inhale 2 puffs as needed       No current facility-administered medications for this visit        Allergies   Allergen Reactions    Sulfa Antibiotics Rash and Palpitations    Ephraim Flavor Hives    Antihistamine & Nasal Deconges [Fexofenadine-Pseudoephed Er]     Codeine     Lisinopril Cough    Mangifera Indica     Nsaids      daypro, orudis    can take motrin    Other      Steroid shots:  Heart race, shakes    Poison Ivy Extract      Other reaction(s): Other (See Comments)  Steroid shots:  Heart race, shakes  Mangoes: rash    Naproxen Rash    Prednisone Palpitations     Review of Systems   Psychiatric/Behavioral: Positive for sleep disturbance  The patient is nervous/anxious  All other systems reviewed and are negative  Video Exam    There were no vitals filed for this visit  Physical Exam  Constitutional:       General: She is not in acute distress  Appearance: Normal appearance  She is well-developed  HENT:      Head: Normocephalic and atraumatic  Pulmonary:      Effort: Pulmonary effort is normal  No respiratory distress  Neurological:      Mental Status: She is alert and oriented to person, place, and time  Psychiatric:         Mood and Affect: Mood normal          Speech: Speech normal          Behavior: Behavior normal         I spent 20 minutes directly with the patient during this visit    [address] acknowledges that she has consented to an online visit or consultation  She understands that the online visit is based solely on information provided by her, and that, in the absence of a face-to-face physical evaluation by the physician, the diagnosis she receives is both limited and provisional in terms of accuracy and completeness  This is not intended to replace a full medical face-to-face evaluation by the physician  Isreal Jones understands and accepts these terms

## 2021-01-11 ENCOUNTER — TELEPHONE (OUTPATIENT)
Dept: FAMILY MEDICINE CLINIC | Facility: CLINIC | Age: 61
End: 2021-01-11

## 2021-01-12 ENCOUNTER — TELEMEDICINE (OUTPATIENT)
Dept: FAMILY MEDICINE CLINIC | Facility: CLINIC | Age: 61
End: 2021-01-12
Payer: COMMERCIAL

## 2021-01-12 DIAGNOSIS — F51.04 CHRONIC INSOMNIA: ICD-10-CM

## 2021-01-12 DIAGNOSIS — F41.9 ANXIETY: Primary | ICD-10-CM

## 2021-01-12 PROCEDURE — 99213 OFFICE O/P EST LOW 20 MIN: CPT | Performed by: NURSE PRACTITIONER

## 2021-01-12 RX ORDER — ALPRAZOLAM 2 MG/1
2 TABLET ORAL 3 TIMES DAILY PRN
Qty: 90 TABLET | Refills: 0 | Status: SHIPPED | OUTPATIENT
Start: 2021-01-12 | End: 2021-02-09 | Stop reason: SDUPTHER

## 2021-01-12 RX ORDER — ZALEPLON 10 MG/1
10 CAPSULE ORAL
Qty: 7 CAPSULE | Refills: 0 | Status: SHIPPED | OUTPATIENT
Start: 2021-01-12 | End: 2021-04-27

## 2021-01-12 NOTE — PROGRESS NOTES
Virtual Regular Visit    Assessment/Plan:    Problem List Items Addressed This Visit        Other    Anxiety - Primary    Relevant Medications    ALPRAZolam (XANAX) 2 MG tablet    Chronic insomnia    Relevant Medications    zaleplon (SONATA) 10 MG capsule    ALPRAZolam (XANAX) 2 MG tablet         Reason for visit is sleep/anxiety  Patient was previously switched from Xanax to Ativan for more optimal sleep and anxiety control; patient states her symptoms worsened  Encounter provider KIERRA Carrasco    Provider located at [address] [place] [id number]      Recent Visits  Date Type Provider Dept   01/11/21 Telephone Samuel Chang   01/07/21 Office Visit KIERRA De Oliveira Pg   Showing recent visits within past 7 days and meeting all other requirements     Today's Visits  Date Type Provider Dept   01/12/21 Telemedicine KIERRA De Oliveira Pg [facility]   Showing today's visits and meeting all other requirements     Future Appointments  No visits were found meeting these conditions  Showing future appointments within next 150 days and meeting all other requirements      The patient was identified by name and date of birth  Srinivas Huitron was informed that this is a telemedicine visit and that the visit is being conducted through "[employer] and patient was informed that this is not a secure, HIPAA-compliant platform  She agrees to proceed     My office door was closed  No one else was in the room  She acknowledged consent and understanding of privacy and security of the video platform  The patient has agreed to participate and understands they can discontinue the visit at any time  Patient is aware this is a billable service       Subjective    Srinivas Huitron is a 64 y o  female    HPI     Past Medical History:   Diagnosis Date    Cancer ([facility] )     Diabetes mellitus ([facility] )     borderline    Irregular heartbeat     Pancreatitis     Pre cancerous lesions per [facility]       Past Surgical History:   Procedure Laterality Date    BRONCHOSCOPY      CHOLECYSTECTOMY         Current Outpatient Medications   Medication Sig Dispense Refill    albuterol 2 mg/5 mL syrup Take 5 mL (2 mg total) by mouth 3 (three) times a day 240 mL 0    ALPRAZolam (XANAX) 2 MG tablet Take 1 tablet (2 mg total) by mouth 3 (three) times a day as needed for anxiety 90 tablet 0    atorvastatin (LIPITOR) 10 mg tablet Take 1 tablet (10 mg total) by mouth daily 30 tablet 5    atorvastatin (LIPITOR) 40 mg tablet       Blood Pressure Monitoring (BLOOD PRESSURE CUFF) MISC by Does not apply route daily (Patient not taking: Reported on 10/29/2020) 1 each 0    ciprofloxacin-dexamethasone (CIPRODEX) otic suspension Administer 4 drops into both ears 2 (two) times a day for 7 days 7 5 mL 0    diphenoxylate-atropine (LOMOTIL) 2 5-0 025 mg per tablet TAKE 1 TABLET BY MOUTH 4 (FOUR) TIMES A DAY AS NEEDED FOR DIARRHEA  90 tablet 3    erythromycin (ILOTYCIN) ophthalmic ointment       famotidine (PEPCID) 40 MG tablet Take 1 tablet (40 mg total) by mouth daily at bedtime 30 tablet 0    hydrocodone-chlorpheniramine polistirex (TUSSIONEX) 10-8 mg/5 mL ER suspension Take 5 mL by mouth every 12 (twelve) hours as needed for coughMax Daily Amount: 10 mL 120 mL 0    hydrOXYzine (VISTARIL) 25 mg capsule Take 1 capsule (25 mg total) by mouth daily at bedtime 30 capsule 0    levalbuterol (XOPENEX HFA) 45 mcg/act inhaler Inhale 1-2 puffs every 4 (four) hours as needed for wheezing or shortness of breath 1 Inhaler 5    Magnesium 200 MG CHEW Chew 200 mg daily      Masks (FACE MASK EARLOOP-STYLE) MISC by Does not apply route 3 (three) times a day as needed (Infection prevention) (Patient not taking: Reported on 10/1/2020) 150 each 5    Misc   Devices (COMMODE BEDSIDE) MISC by Does not apply route daily as needed (shortness of breath) (Patient not taking: Reported on 10/29/2020) 1 each 0    mupirocin (BACTROBAN) 2 % nasal ointment into each nostril 2 (two) times a day (Patient not taking: Reported on 11/24/2020) 10 g 0    nicotine (NICODERM CQ) 7 mg/24hr TD 24 hr patch Place 1 patch on the skin every 24 hours (Patient not taking: Reported on 11/24/2020) 84 patch 0    nivolumab (OPDIVO) 240 mg/24 mL SOLN injection Infuse 480 mg into a venous catheter      nystatin (MYCOSTATIN) 500,000 units/5 mL suspension Swish 5 mL around mouth and swallow 4 times daily for 14 days  60 mL 0    oxyCODONE (ROXICODONE) 5 mg immediate release tablet Take 1 tablet (5 mg total) by mouth every 4 (four) hours as needed for moderate pain or severe painMax Daily Amount: 30 mg 180 tablet 0    polyethylene glycol (MIRALAX) 17 g packet Take 17 g by mouth daily   0    prednisoLONE acetate (PRED FORTE) 1 % ophthalmic suspension       predniSONE 5 mg tablet Take 15 mg by mouth daily      prochlorperazine (COMPAZINE) 10 mg tablet Take 1 tablet by mouth every 6 (six) hours as needed  3    senna-docusate sodium (SENOKOT-S) 8 6-50 mg per tablet Take 1 tablet by mouth daily at bedtime 30 tablet 5    sertraline (ZOLOFT) 100 mg tablet Take 1 5 tablets (150 mg total) by mouth daily at bedtime 150 tablet 3    SUMAtriptan (IMITREX) 50 mg tablet Take 1 tablet (50 mg total) by mouth daily as needed for migraine for up to 1 dose 30 tablet 0    temazepam (RESTORIL) 30 mg capsule Take 1 capsule (30 mg total) by mouth daily at bedtime 30 capsule 0    Thermometer MISC by Does not apply route daily 1 each 0    tiotropium (SPIRIVA) 18 mcg inhalation capsule Place 18 mcg into inhaler and inhale daily      Tiotropium Bromide Monohydrate (SPIRIVA HANDIHALER IN) Inhale 2 puffs as needed      zaleplon (SONATA) 10 MG capsule Take 1 capsule (10 mg total) by mouth daily at bedtime 7 capsule 0     No current facility-administered medications for this visit           Allergies   Allergen Reactions    Sulfa Antibiotics Rash and Palpitations    [place] Flavor Hives    Antihistamine & Nasal Deconges [Fexofenadine-Pseudoephed Er]     Codeine     Lisinopril Cough    Mangifera Indica     Nsaids      daypro, orudis    can take motrin    Other      Steroid shots:  Heart race, shakes    Poison Ivy Extract      Other reaction(s): Other (See Comments)  Steroid shots:  Heart race, shakes  Mangoes: rash    Naproxen Rash    Prednisone Palpitations     Review of Systems   Psychiatric/Behavioral: Positive for sleep disturbance  The patient is nervous/anxious  All other systems reviewed and are negative  Video Exam    There were no vitals filed for this visit  Physical Exam  Constitutional:       General: She is not in acute distress  Appearance: Normal appearance  She is well-developed  HENT:      Head: Normocephalic and atraumatic  Pulmonary:      Effort: Pulmonary effort is normal  No respiratory distress  Neurological:      Mental Status: She is alert and oriented to person, place, and time  Psychiatric:         Mood and Affect: Mood normal          Speech: Speech normal          Behavior: Behavior normal           I spent 15 minutes directly with the patient during this visit      [address] acknowledges that she has consented to an online visit or consultation  She understands that the online visit is based solely on information provided by her, and that, in the absence of a face-to-face physical evaluation by the physician, the diagnosis she receives is both limited and provisional in terms of accuracy and completeness  This is not intended to replace a full medical face-to-face evaluation by the physician  Nancy Robertson understands and accepts these terms

## 2021-01-20 ENCOUNTER — OFFICE VISIT (OUTPATIENT)
Dept: FAMILY MEDICINE CLINIC | Facility: CLINIC | Age: 61
End: 2021-01-20
Payer: COMMERCIAL

## 2021-01-20 VITALS
HEART RATE: 84 BPM | DIASTOLIC BLOOD PRESSURE: 96 MMHG | WEIGHT: 141.6 LBS | RESPIRATION RATE: 18 BRPM | SYSTOLIC BLOOD PRESSURE: 150 MMHG | TEMPERATURE: 98.3 F | HEIGHT: 66 IN | BODY MASS INDEX: 22.76 KG/M2 | OXYGEN SATURATION: 97 %

## 2021-01-20 DIAGNOSIS — F51.04 CHRONIC INSOMNIA: ICD-10-CM

## 2021-01-20 DIAGNOSIS — Z23 NEED FOR PNEUMOCOCCAL VACCINATION: ICD-10-CM

## 2021-01-20 DIAGNOSIS — C34.91 ADENOSQUAMOUS CARCINOMA OF RIGHT LUNG (HCC): ICD-10-CM

## 2021-01-20 DIAGNOSIS — C34.91 NON-SMALL CELL CANCER OF RIGHT LUNG (HCC): ICD-10-CM

## 2021-01-20 DIAGNOSIS — Z09 FOLLOW UP: Primary | ICD-10-CM

## 2021-01-20 PROCEDURE — 99213 OFFICE O/P EST LOW 20 MIN: CPT | Performed by: NURSE PRACTITIONER

## 2021-01-20 PROCEDURE — 90732 PPSV23 VACC 2 YRS+ SUBQ/IM: CPT | Performed by: NURSE PRACTITIONER

## 2021-01-20 PROCEDURE — G0009 ADMIN PNEUMOCOCCAL VACCINE: HCPCS | Performed by: NURSE PRACTITIONER

## 2021-01-20 RX ORDER — OXYCODONE HYDROCHLORIDE 5 MG/1
5 TABLET ORAL EVERY 4 HOURS PRN
Qty: 180 TABLET | Refills: 0 | Status: SHIPPED | OUTPATIENT
Start: 2021-01-20 | End: 2021-02-17 | Stop reason: SDUPTHER

## 2021-01-20 RX ORDER — TEMAZEPAM 30 MG/1
30 CAPSULE ORAL
Qty: 30 CAPSULE | Refills: 0 | Status: SHIPPED | OUTPATIENT
Start: 2021-01-20 | End: 2021-02-17 | Stop reason: SDUPTHER

## 2021-01-20 NOTE — PROGRESS NOTES
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[address] )  -     Discontinue: oxyCODONE (ROXICODONE) 5 mg immediate release tablet; Take 1 tablet (5 mg total) by mouth every 4 (four) hours as needed for moderate pain or severe painMax Daily Amount: 30 mg    Non-small cell cancer of right lung (HCC)  -     Discontinue: oxyCODONE (ROXICODONE) 5 mg immediate release tablet; Take 1 tablet (5 mg total) by mouth every 4 (four) hours as needed for moderate pain or severe painMax Daily Amount: 30 mg    Chronic insomnia  -     Discontinue: temazepam (RESTORIL) 30 mg capsule; Take 1 capsule (30 mg total) by mouth daily at bedtime      Subjective:      Patient ID: Ignacio Segovia is a 64 y o  female  Patient presents to [address] [place] [address] as follow up  Allergies, medical history and current medications reviewed with patient; patient reports taking all medications as prescribed without issues  Patient reports she needed to reschedule ENT appointment due to inclement weather  Patient reports she is also interested in the Covid-19 vaccine  Patient continues to struggle to sleep, but is not interested in referral at this time  Patient Care Team:  Maryuri Duong as PCP - General (Family Medicine)  Ronaldo Mckee MD as PCP - [address] [place] [address] (RTE)  Carly Morgan MD (Oncology)  Jenna Ruggiero MD (Radiation Oncology)  [facility] (Ophthalmology)    Review of Systems   Psychiatric/Behavioral: Positive for sleep disturbance  All other systems reviewed and are negative      Objective:    /96 (BP Location: Left arm, Patient Position: Sitting, Cuff Size: Large)   Pulse 84   Temp 98 3 °F (36 8 °C) (Temporal)   Resp 18   Ht 5' 6" (1 676 m)   Wt 64 2 kg (141 lb 9 6 oz)   SpO2 97%   BMI 22 85 kg/m²      Physical Exam  Vitals signs and nursing note reviewed  Constitutional:       General: She is not in acute distress  Appearance: Normal appearance  She is well-developed  HENT:      Head: Normocephalic and atraumatic  Eyes:      General: Lids are normal       Conjunctiva/sclera: Conjunctivae normal    Neck:      Musculoskeletal: Normal range of motion  Trachea: No tracheal deviation  Cardiovascular:      Rate and Rhythm: Normal rate and regular rhythm  Heart sounds: Normal heart sounds, S1 normal and S2 normal  No murmur  Pulmonary:      Effort: Pulmonary effort is normal  No respiratory distress  Breath sounds: Normal breath sounds  Abdominal:      General: There is no distension  Tenderness: There is no guarding  Musculoskeletal: Normal range of motion  Skin:     General: Skin is warm and dry  Neurological:      Mental Status: She is alert and oriented to person, place, and time     Psychiatric:         Speech: Speech normal

## 2021-01-21 ENCOUNTER — DOCUMENTATION (OUTPATIENT)
Dept: BEHAVIORAL/MENTAL HEALTH CLINIC | Facility: CLINIC | Age: 61
End: 2021-01-21

## 2021-01-21 NOTE — PROGRESS NOTES
01/21/2021- letter was sent to the Client to see if she is still interested in receiving mental health services

## 2021-02-09 DIAGNOSIS — F41.9 ANXIETY: ICD-10-CM

## 2021-02-09 DIAGNOSIS — F51.04 CHRONIC INSOMNIA: ICD-10-CM

## 2021-02-09 RX ORDER — ALPRAZOLAM 2 MG/1
2 TABLET ORAL 3 TIMES DAILY PRN
Qty: 90 TABLET | Refills: 0 | Status: SHIPPED | OUTPATIENT
Start: 2021-02-09 | End: 2021-02-17 | Stop reason: SDUPTHER

## 2021-02-17 ENCOUNTER — DOCUMENTATION (OUTPATIENT)
Dept: BEHAVIORAL/MENTAL HEALTH CLINIC | Facility: CLINIC | Age: 61
End: 2021-02-17

## 2021-02-17 ENCOUNTER — OFFICE VISIT (OUTPATIENT)
Dept: FAMILY MEDICINE CLINIC | Facility: CLINIC | Age: 61
End: 2021-02-17
Payer: COMMERCIAL

## 2021-02-17 VITALS
HEART RATE: 87 BPM | OXYGEN SATURATION: 96 % | BODY MASS INDEX: 22.66 KG/M2 | DIASTOLIC BLOOD PRESSURE: 82 MMHG | WEIGHT: 141 LBS | HEIGHT: 66 IN | SYSTOLIC BLOOD PRESSURE: 100 MMHG | TEMPERATURE: 97.5 F

## 2021-02-17 DIAGNOSIS — C34.91 NON-SMALL CELL CANCER OF RIGHT LUNG (HCC): ICD-10-CM

## 2021-02-17 DIAGNOSIS — F41.9 ANXIETY: ICD-10-CM

## 2021-02-17 DIAGNOSIS — C34.91 ADENOSQUAMOUS CARCINOMA OF RIGHT LUNG (HCC): ICD-10-CM

## 2021-02-17 DIAGNOSIS — Z09 FOLLOW UP: Primary | ICD-10-CM

## 2021-02-17 DIAGNOSIS — S90.32XA CONTUSION OF LEFT FOOT, INITIAL ENCOUNTER: ICD-10-CM

## 2021-02-17 DIAGNOSIS — F51.04 CHRONIC INSOMNIA: ICD-10-CM

## 2021-02-17 PROCEDURE — 99213 OFFICE O/P EST LOW 20 MIN: CPT | Performed by: NURSE PRACTITIONER

## 2021-02-17 RX ORDER — TEMAZEPAM 30 MG/1
30 CAPSULE ORAL
Qty: 30 CAPSULE | Refills: 0 | Status: SHIPPED | OUTPATIENT
Start: 2021-02-17 | End: 2021-03-16 | Stop reason: SDUPTHER

## 2021-02-17 RX ORDER — OXYCODONE HYDROCHLORIDE 5 MG/1
5 TABLET ORAL EVERY 4 HOURS PRN
Qty: 180 TABLET | Refills: 0 | Status: SHIPPED | OUTPATIENT
Start: 2021-02-17 | End: 2021-03-18 | Stop reason: SDUPTHER

## 2021-02-17 RX ORDER — ALPRAZOLAM 2 MG/1
2 TABLET ORAL 3 TIMES DAILY PRN
Qty: 90 TABLET | Refills: 0 | Status: SHIPPED | OUTPATIENT
Start: 2021-02-17 | End: 2021-03-08 | Stop reason: SDUPTHER

## 2021-02-17 NOTE — PROGRESS NOTES
Assessment/Plan:      There are no diagnoses linked to this encounter  Subjective:     Patient ID: Jacki Feldman is a 64 y o  female  Outpatient Discharge Summary:   Admission Date: 09/22/2020  Marcella Fay was referred by  PCP her at [address] [place] [address]  Discharge Date: 02/17/2021    Discharge Diagnosis:    No diagnosis found      Treating Therapist: Mardel Mortimer MSW LCSW  Treatment Complications: history of physical and mental health issues  Presenting Problem: Depression and trauma  Course of treatment includes:    individual therapy   Treatment Progress: poor  Criteria for Discharge: Client missed a numbe of appointments letter was sent to see if the Client is still intrested in services without reposnse  Aftercare recommendations include to return to therapy if needed  Discharge Medications include:  Current Outpatient Medications:     albuterol 2 mg/5 mL syrup, Take 5 mL (2 mg total) by mouth 3 (three) times a day, Disp: 240 mL, Rfl: 0    ALPRAZolam (XANAX) 2 MG tablet, Take 1 tablet (2 mg total) by mouth 3 (three) times a day as needed for anxiety, Disp: 90 tablet, Rfl: 0    atorvastatin (LIPITOR) 10 mg tablet, Take 1 tablet (10 mg total) by mouth daily, Disp: 30 tablet, Rfl: 5    atorvastatin (LIPITOR) 40 mg tablet, , Disp: , Rfl:     Blood Pressure Monitoring (BLOOD PRESSURE CUFF) MISC, by Does not apply route daily, Disp: 1 each, Rfl: 0    ciprofloxacin-dexamethasone (CIPRODEX) otic suspension, Administer 4 drops into both ears 2 (two) times a day for 7 days, Disp: 7 5 mL, Rfl: 0    diphenoxylate-atropine (LOMOTIL) 2 5-0 025 mg per tablet, TAKE 1 TABLET BY MOUTH 4 (FOUR) TIMES A DAY AS NEEDED FOR DIARRHEA , Disp: 90 tablet, Rfl: 3    erythromycin (ILOTYCIN) ophthalmic ointment, , Disp: , Rfl:     famotidine (PEPCID) 40 MG tablet, Take 1 tablet (40 mg total) by mouth daily at bedtime, Disp: 30 tablet, Rfl: 0    hydrocodone-chlorpheniramine polistirex (TUSSIONEX) 10-8 mg/5 mL ER suspension, Take 5 mL by mouth every 12 (twelve) hours as needed for coughMax Daily Amount: 10 mL, Disp: 120 mL, Rfl: 0    hydrOXYzine (VISTARIL) 25 mg capsule, Take 1 capsule (25 mg total) by mouth daily at bedtime, Disp: 30 capsule, Rfl: 0    levalbuterol (XOPENEX HFA) 45 mcg/act inhaler, Inhale 1-2 puffs every 4 (four) hours as needed for wheezing or shortness of breath, Disp: 1 Inhaler, Rfl: 5    Magnesium 200 MG CHEW, Chew 200 mg daily, Disp: , Rfl:     Masks (FACE MASK EARLOOP-STYLE) MISC, by Does not apply route 3 (three) times a day as needed (Infection prevention), Disp: 150 each, Rfl: 5    Misc  Devices (COMMODE BEDSIDE) MISC, by Does not apply route daily as needed (shortness of breath), Disp: 1 each, Rfl: 0    mupirocin (BACTROBAN) 2 % nasal ointment, into each nostril 2 (two) times a day, Disp: 10 g, Rfl: 0    nicotine (NICODERM CQ) 7 mg/24hr TD 24 hr patch, Place 1 patch on the skin every 24 hours, Disp: 84 patch, Rfl: 0    nivolumab (OPDIVO) 240 mg/24 mL SOLN injection, Infuse 480 mg into a venous catheter, Disp: , Rfl:     nystatin (MYCOSTATIN) 500,000 units/5 mL suspension, Swish 5 mL around mouth and swallow 4 times daily for 14 days  , Disp: 60 mL, Rfl: 0    oxyCODONE (ROXICODONE) 5 mg immediate release tablet, Take 1 tablet (5 mg total) by mouth every 4 (four) hours as needed for moderate pain or severe painMax Daily Amount: 30 mg, Disp: 180 tablet, Rfl: 0    polyethylene glycol (MIRALAX) 17 g packet, Take 17 g by mouth daily , Disp: , Rfl: 0    prednisoLONE acetate (PRED FORTE) 1 % ophthalmic suspension, , Disp: , Rfl:     predniSONE 5 mg tablet, Take 15 mg by mouth daily, Disp: , Rfl:     prochlorperazine (COMPAZINE) 10 mg tablet, Take 1 tablet by mouth every 6 (six) hours as needed, Disp: , Rfl: 3    senna-docusate sodium (SENOKOT-S) 8 6-50 mg per tablet, Take 1 tablet by mouth daily at bedtime, Disp: 30 tablet, Rfl: 5    sertraline (ZOLOFT) 100 mg tablet, Take 1 5 tablets (150 mg total) by mouth daily at bedtime, Disp: 150 tablet, Rfl: 3    SUMAtriptan (IMITREX) 50 mg tablet, Take 1 tablet (50 mg total) by mouth daily as needed for migraine for up to 1 dose, Disp: 30 tablet, Rfl: 0    temazepam (RESTORIL) 30 mg capsule, Take 1 capsule (30 mg total) by mouth daily at bedtime, Disp: 30 capsule, Rfl: 0    Thermometer MISC, by Does not apply route daily, Disp: 1 each, Rfl: 0    tiotropium (SPIRIVA) 18 mcg inhalation capsule, Place 18 mcg into inhaler and inhale daily, Disp: , Rfl:     Tiotropium Bromide Monohydrate (SPIRIVA HANDIHALER IN), Inhale 2 puffs as needed, Disp: , Rfl:     zaleplon (SONATA) 10 MG capsule, Take 1 capsule (10 mg total) by mouth daily at bedtime, Disp: 7 capsule, Rfl: 0    Prognosis: fair

## 2021-02-17 NOTE — PROGRESS NOTES
Assessment/Plan:     Diagnoses and all orders for this visit:    Follow up    Contusion of left foot, initial encounter    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HCC)  -     oxyCODONE (ROXICODONE) 5 mg immediate release tablet; Take 1 tablet (5 mg total) by mouth every 4 (four) hours as needed for moderate pain or severe painMax Daily Amount: 30 mg    Non-small cell cancer of right lung (HCC)  -     oxyCODONE (ROXICODONE) 5 mg immediate release tablet; Take 1 tablet (5 mg total) by mouth every 4 (four) hours as needed for moderate pain or severe painMax Daily Amount: 30 mg    Anxiety  -     ALPRAZolam (XANAX) 2 MG tablet; Take 1 tablet (2 mg total) by mouth 3 (three) times a day as needed for anxiety    Chronic insomnia  -     ALPRAZolam (XANAX) 2 MG tablet; Take 1 tablet (2 mg total) by mouth 3 (three) times a day as needed for anxiety  -     temazepam (RESTORIL) 30 mg capsule; Take 1 capsule (30 mg total) by mouth daily at bedtime      Subjective:      Patient ID: Valorie Garcia is a 64 y o  female  Patient presents to [address] [place] [address] as follow up  Allergies, medical history and current medications reviewed with patient; patient reports taking all medications as prescribed without issues and is requesting refills  Patient reports her dog stepped on her foot, and she since has noticed some pain and bruising to the left 2nd and 3rd toes  Patient denies any other problems or concerns at this time  Patient Care Team:  Slava Wall as PCP - General (Family Medicine)  Belen Arias MD as PCP - [address] [place] [address] (RTE)  Olman Robledo MD (Oncology)  Piper Chan MD (Radiation Oncology)  [facility] (Ophthalmology)    Review of Systems   Musculoskeletal: Positive for joint swelling  All other systems reviewed and are negative      Objective:    /82 (BP Location: Left arm, Patient Position: Sitting, Cuff Size: Large)   Pulse 87   Temp 97 5 °F (36 4 °C) (Temporal)  5' 6" (1 676 m)   Wt 64 kg (141 lb)   SpO2 96%   BMI 22 76 kg/m²      Physical Exam  Vitals signs and nursing note reviewed  Constitutional:       General: She is not in acute distress  Appearance: Normal appearance  She is well-developed  HENT:      Head: Normocephalic and atraumatic  Eyes:      General: Lids are normal       Conjunctiva/sclera: Conjunctivae normal    Neck:      Musculoskeletal: Normal range of motion  Trachea: No tracheal deviation  Cardiovascular:      Rate and Rhythm: Normal rate and regular rhythm  Heart sounds: Normal heart sounds, S1 normal and S2 normal  No murmur  Pulmonary:      Effort: Pulmonary effort is normal  No respiratory distress  Breath sounds: Normal breath sounds  Abdominal:      General: There is no distension  Tenderness: There is no guarding  Musculoskeletal: Normal range of motion  Left foot: Swelling (with associated ecchymosis) present  Skin:     General: Skin is warm and dry  Neurological:      Mental Status: She is alert and oriented to person, place, and time     Psychiatric:         Speech: Speech normal

## 2021-02-25 DIAGNOSIS — E78.2 MIXED HYPERLIPIDEMIA: Primary | ICD-10-CM

## 2021-03-01 DIAGNOSIS — E78.2 MIXED HYPERLIPIDEMIA: ICD-10-CM

## 2021-03-01 RX ORDER — ATORVASTATIN CALCIUM 10 MG/1
10 TABLET, FILM COATED ORAL DAILY
Qty: 30 TABLET | Refills: 5 | Status: CANCELLED | OUTPATIENT
Start: 2021-03-01

## 2021-03-02 ENCOUNTER — TELEPHONE (OUTPATIENT)
Dept: FAMILY MEDICINE CLINIC | Facility: CLINIC | Age: 61
End: 2021-03-02

## 2021-03-02 DIAGNOSIS — R53.1 GENERAL WEAKNESS: ICD-10-CM

## 2021-03-02 DIAGNOSIS — K86.1 OTHER CHRONIC PANCREATITIS (HCC): ICD-10-CM

## 2021-03-02 DIAGNOSIS — C34.91 ADENOSQUAMOUS CARCINOMA OF RIGHT LUNG (HCC): ICD-10-CM

## 2021-03-02 DIAGNOSIS — C34.91 NON-SMALL CELL CANCER OF RIGHT LUNG (HCC): ICD-10-CM

## 2021-03-02 DIAGNOSIS — J43.1 PANLOBULAR EMPHYSEMA (HCC): Primary | ICD-10-CM

## 2021-03-02 NOTE — TELEPHONE ENCOUNTER
Patient called asking to have [facility] for PT restarted     Phone- [phone]  Fax- [phone]

## 2021-03-03 DIAGNOSIS — E78.2 MIXED HYPERLIPIDEMIA: ICD-10-CM

## 2021-03-03 RX ORDER — ATORVASTATIN CALCIUM 40 MG/1
TABLET, FILM COATED ORAL
Qty: 100 TABLET | Refills: 3 | OUTPATIENT
Start: 2021-03-03

## 2021-03-03 RX ORDER — ATORVASTATIN CALCIUM 10 MG/1
10 TABLET, FILM COATED ORAL DAILY
Qty: 90 TABLET | Refills: 3 | Status: SHIPPED | OUTPATIENT
Start: 2021-03-03 | End: 2021-04-27

## 2021-03-04 ENCOUNTER — TELEPHONE (OUTPATIENT)
Dept: FAMILY MEDICINE CLINIC | Facility: CLINIC | Age: 61
End: 2021-03-04

## 2021-03-04 DIAGNOSIS — C34.91 ADENOSQUAMOUS CARCINOMA OF RIGHT LUNG (HCC): Primary | ICD-10-CM

## 2021-03-04 DIAGNOSIS — C34.91 NON-SMALL CELL CANCER OF RIGHT LUNG (HCC): ICD-10-CM

## 2021-03-04 DIAGNOSIS — J43.1 PANLOBULAR EMPHYSEMA (HCC): ICD-10-CM

## 2021-03-04 RX ORDER — ALBUTEROL SULFATE 90 UG/1
2 AEROSOL, METERED RESPIRATORY (INHALATION) EVERY 6 HOURS PRN
Qty: 1 INHALER | Refills: 0 | Status: SHIPPED | OUTPATIENT
Start: 2021-03-04 | End: 2021-03-04 | Stop reason: ALTCHOICE

## 2021-03-04 RX ORDER — LEVALBUTEROL TARTRATE 45 UG/1
1-2 AEROSOL, METERED ORAL EVERY 4 HOURS PRN
Qty: 1 INHALER | Refills: 5 | Status: SHIPPED | OUTPATIENT
Start: 2021-03-04 | End: 2021-06-24 | Stop reason: SDUPTHER

## 2021-03-04 NOTE — TELEPHONE ENCOUNTER
Spoke to pt regarding a concern she voiced to Inell Kin about her PCP leaving the office  Reassured the patient we are here for her and will do our best to take excellent care of her

## 2021-03-04 NOTE — TELEPHONE ENCOUNTER
Spoke to Marcella Fay today  She states she had an emergency CT on the 2nd and has an oncologist appt on the 10th  She states she is on antibiotics for 10 days and the oncologist told her to use her emergency inhaler 4x a day so she will need that refilled  I will make sure her PT referral is taken care of  Thanks!

## 2021-03-08 DIAGNOSIS — F51.04 CHRONIC INSOMNIA: ICD-10-CM

## 2021-03-08 DIAGNOSIS — F41.9 ANXIETY: ICD-10-CM

## 2021-03-08 RX ORDER — ALPRAZOLAM 2 MG/1
2 TABLET ORAL 3 TIMES DAILY PRN
Qty: 90 TABLET | Refills: 2 | Status: SHIPPED | OUTPATIENT
Start: 2021-03-08 | End: 2021-03-18 | Stop reason: SDUPTHER

## 2021-03-10 DIAGNOSIS — Z23 ENCOUNTER FOR IMMUNIZATION: ICD-10-CM

## 2021-03-16 DIAGNOSIS — F51.04 CHRONIC INSOMNIA: ICD-10-CM

## 2021-03-16 RX ORDER — TEMAZEPAM 30 MG/1
30 CAPSULE ORAL
Qty: 30 CAPSULE | Refills: 0 | Status: SHIPPED | OUTPATIENT
Start: 2021-03-16 | End: 2021-03-18 | Stop reason: SDUPTHER

## 2021-03-18 ENCOUNTER — TELEPHONE (OUTPATIENT)
Dept: BEHAVIORAL/MENTAL HEALTH CLINIC | Facility: CLINIC | Age: 61
End: 2021-03-18

## 2021-03-18 ENCOUNTER — OFFICE VISIT (OUTPATIENT)
Dept: FAMILY MEDICINE CLINIC | Facility: CLINIC | Age: 61
End: 2021-03-18
Payer: COMMERCIAL

## 2021-03-18 VITALS
WEIGHT: 142 LBS | HEART RATE: 90 BPM | BODY MASS INDEX: 22.82 KG/M2 | OXYGEN SATURATION: 95 % | TEMPERATURE: 97.8 F | RESPIRATION RATE: 18 BRPM | DIASTOLIC BLOOD PRESSURE: 78 MMHG | SYSTOLIC BLOOD PRESSURE: 124 MMHG | HEIGHT: 66 IN

## 2021-03-18 DIAGNOSIS — J43.1 PANLOBULAR EMPHYSEMA (HCC): ICD-10-CM

## 2021-03-18 DIAGNOSIS — Z09 FOLLOW UP: Primary | ICD-10-CM

## 2021-03-18 DIAGNOSIS — F41.9 ANXIETY: ICD-10-CM

## 2021-03-18 DIAGNOSIS — F51.04 CHRONIC INSOMNIA: ICD-10-CM

## 2021-03-18 DIAGNOSIS — C34.91 NON-SMALL CELL CANCER OF RIGHT LUNG (HCC): ICD-10-CM

## 2021-03-18 DIAGNOSIS — C34.91 ADENOSQUAMOUS CARCINOMA OF RIGHT LUNG (HCC): ICD-10-CM

## 2021-03-18 PROCEDURE — 99213 OFFICE O/P EST LOW 20 MIN: CPT | Performed by: NURSE PRACTITIONER

## 2021-03-18 PROCEDURE — 1036F TOBACCO NON-USER: CPT | Performed by: NURSE PRACTITIONER

## 2021-03-18 RX ORDER — TEMAZEPAM 30 MG/1
30 CAPSULE ORAL
Qty: 30 CAPSULE | Refills: 0 | Status: SHIPPED | OUTPATIENT
Start: 2021-03-18 | End: 2021-04-15 | Stop reason: SDUPTHER

## 2021-03-18 RX ORDER — OXYCODONE HYDROCHLORIDE 5 MG/1
5 TABLET ORAL EVERY 4 HOURS PRN
Qty: 180 TABLET | Refills: 0 | Status: SHIPPED | OUTPATIENT
Start: 2021-03-18 | End: 2021-04-15 | Stop reason: SDUPTHER

## 2021-03-18 RX ORDER — ALPRAZOLAM 1 MG/1
2 TABLET ORAL 3 TIMES DAILY PRN
Qty: 180 TABLET | Refills: 0 | Status: SHIPPED | OUTPATIENT
Start: 2021-03-18 | End: 2021-04-15 | Stop reason: SDUPTHER

## 2021-03-18 NOTE — PROGRESS NOTES
Assessment/Plan:     Diagnoses and all orders for this visit:    Follow up    Panlobular emphysema ([facility] [address] [address] )  Comments:  Complete Prednisone course, as prescribed by Oncology    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HCC)  -     oxyCODONE (ROXICODONE) 5 mg immediate release tablet; Take 1 tablet (5 mg total) by mouth every 4 (four) hours as needed for moderate pain or severe painMax Daily Amount: 30 mg    Non-small cell cancer of right lung (HCC)  -     oxyCODONE (ROXICODONE) 5 mg immediate release tablet; Take 1 tablet (5 mg total) by mouth every 4 (four) hours as needed for moderate pain or severe painMax Daily Amount: 30 mg    Chronic insomnia  -     temazepam (RESTORIL) 30 mg capsule; Take 1 capsule (30 mg total) by mouth daily at bedtime  -     ALPRAZolam (XANAX) 1 mg tablet; Take 2 tablets (2 mg total) by mouth 3 (three) times a day as needed for anxiety    Anxiety  -     ALPRAZolam (XANAX) 1 mg tablet; Take 2 tablets (2 mg total) by mouth 3 (three) times a day as needed for anxiety      Subjective:      Patient ID: Doc Coppola is a 64 y o  female  Patient presents to [address] [place] [address] as follow up  Allergies, medical history and current medications reviewed with patient; patient reports taking all medications as prescribed without issues  Patient reports she had an episode of severe shortness of breath and underwent STAT CT of the chest  Patient states she had subsequent follow up with Oncology Dr Montrell Ogden, who initiated oral Prednisone for acute Emphysema  Patient states she completed 5/10 days of Prednisone, but received 2nd dose of the Covid-19 vaccine and has not yet completed the Prednisone  Patient states she continues to experience mild chest tightness  Patient also reports she has been unable to tolerate 2 mg tablets of Alprazolam due to changes in        Patient Care Team:  Bismark Leggett as PCP - General (Family Medicine)  Franklyn Gatica MD as PCP - PCP-[facility] (RTE)  Khai Arauz MD (Oncology)  Eleno Ormond, MD (Radiation Oncology)  [facility] (Ophthalmology)    Review of Systems   Respiratory: Positive for chest tightness and shortness of breath  All other systems reviewed and are negative  Objective:    /78 (BP Location: Left arm, Patient Position: Sitting, Cuff Size: Large)   Pulse 90   Temp 97 8 °F (36 6 °C) (Temporal)   Resp 18   Ht 5' 6" (1 676 m)   Wt 64 4 kg (142 lb)   SpO2 95%   BMI 22 92 kg/m²      Physical Exam  Vitals signs and nursing note reviewed  Constitutional:       General: She is not in acute distress  Appearance: Normal appearance  She is well-developed  HENT:      Head: Normocephalic and atraumatic  Eyes:      General: Lids are normal       Conjunctiva/sclera: Conjunctivae normal    Neck:      Musculoskeletal: Normal range of motion  Trachea: No tracheal deviation  Cardiovascular:      Rate and Rhythm: Normal rate and regular rhythm  Heart sounds: Normal heart sounds, S1 normal and S2 normal  No murmur  Pulmonary:      Effort: Pulmonary effort is normal  No respiratory distress  Breath sounds: Decreased breath sounds and wheezing present  Abdominal:      General: There is no distension  Tenderness: There is no guarding  Musculoskeletal: Normal range of motion  Skin:     General: Skin is warm and dry  Neurological:      Mental Status: She is alert and oriented to person, place, and time     Psychiatric:         Speech: Speech normal

## 2021-03-18 NOTE — TELEPHONE ENCOUNTER
Client is wanting to return to services for mental health therapy  Message was left for the Client to contact this therapist at the [address] to reschedule appointment   [phone]

## 2021-03-18 NOTE — TELEPHONE ENCOUNTER
Attempted to contact the Client left a message on her voicemail to contact this therapist at the [address] [place] [address] to schedule her appointment [phone]

## 2021-03-18 NOTE — PATIENT INSTRUCTIONS
Wellness Visit for Adults   WHAT YOU NEED TO KNOW:   What is a wellness visit? A wellness visit is when you see your healthcare provider to get screened for health problems  Your healthcare provider will also give you advice on how to stay healthy  Write down your questions so you remember to ask them  Ask your healthcare provider how often you should have a wellness visit  What happens at a wellness visit? Your healthcare provider will ask about your health, and your family history of health problems  This includes high blood pressure, heart disease, and cancer  He or she will ask if you have symptoms that concern you, if you smoke, and about your mood  You may also be asked about your intake of medicines, supplements, food, and alcohol  Any of the following may be done:  · Your weight  will be checked  Your height may also be checked so your body mass index (BMI) can be calculated  Your BMI shows if you are at a healthy weight  · Your blood pressure  and heart rate will be checked  Your temperature may also be checked  · Blood and urine tests  may be done  Blood tests may be done to check your cholesterol levels  Abnormal cholesterol levels increase your risk for heart disease and stroke  You may also need a blood or urine test to check for diabetes if you are at increased risk  Urine tests may be done to look for signs of an infection or kidney disease  · A physical exam  includes checking your heartbeat and lungs with a stethoscope  Your healthcare provider may also check your skin to look for sun damage  · Screening tests  may be recommended  A screening test is done to check for diseases that may not cause symptoms  The screening tests you may need depend on your age, gender, family history, and lifestyle habits  For example, colorectal screening may be recommended if you are 48years old or older  What screening tests do I need if I am a woman?    · A Pap smear  is used to screen for cervical cancer  Pap smears are usually done every 3 to 5 years depending on your age  You may need them more often if you have had abnormal Pap smear test results in the past  Ask your healthcare provider how often you should have a Pap smear  · A mammogram  is an x-ray of your breasts to screen for breast cancer  Experts recommend mammograms every 2 years starting at age 48 years  You may need a mammogram at age 52 years or younger if you have an increased risk for breast cancer  Talk to your healthcare provider about when you should start having mammograms and how often you need them  What vaccines might I need? · Get an influenza vaccine  every year  The influenza vaccine protects you from the flu  Several types of viruses cause the flu  The viruses change over time, so new vaccines are made each year  · Get a tetanus-diphtheria (Td) booster vaccine  every 10 years  This vaccine protects you against tetanus and diphtheria  Tetanus is a severe infection that may cause painful muscle spasms and lockjaw  Diphtheria is a severe bacterial infection that causes a thick covering in the back of your mouth and throat  · Get a human papillomavirus (HPV) vaccine  if you are female and aged 23 to 32 or male 23 to 24 and never received it  This vaccine protects you from HPV infection  HPV is the most common infection spread by sexual contact  HPV may also cause vaginal, penile, and anal cancers  · Get a pneumococcal vaccine  if you are aged 72 years or older  The pneumococcal vaccine is an injection given to protect you from pneumococcal disease  Pneumococcal disease is an infection caused by pneumococcal bacteria  The infection may cause pneumonia, meningitis, or an ear infection  · Get a shingles vaccine  if you are 60 or older, even if you have had shingles before  The shingles vaccine is an injection to protect you from the varicella-zoster virus  This is the same virus that causes chickenpox   Shingles is a painful rash that develops in people who had chickenpox or have been exposed to the virus  How can I eat healthy? My Plate is a model for planning healthy meals  It shows the types and amounts of foods that should go on your plate  Fruits and vegetables make up about half of your plate, and grains and protein make up the other half  A serving of dairy is included on the side of your plate  The amount of calories and serving sizes you need depends on your age, gender, weight, and height  Examples of healthy foods are listed below:  · Eat a variety of vegetables  such as dark green, red, and orange vegetables  You can also include canned vegetables low in sodium (salt) and frozen vegetables without added butter or sauces  · Eat a variety of fresh fruits , canned fruit in 100% juice, frozen fruit, and dried fruit  · Include whole grains  At least half of the grains you eat should be whole grains  Examples include whole-wheat bread, wheat pasta, brown rice, and whole-grain cereals such as oatmeal     · Eat a variety of protein foods such as seafood (fish and shellfish), lean meat, and poultry without skin (turkey and chicken)  Examples of lean meats include pork leg, shoulder, or tenderloin, and beef round, sirloin, tenderloin, and extra lean ground beef  Other protein foods include eggs and egg substitutes, beans, peas, soy products, nuts, and seeds  · Choose low-fat dairy products such as skim or 1% milk or low-fat yogurt, cheese, and cottage cheese  · Limit unhealthy fats  such as butter, hard margarine, and shortening  How much exercise do I need? Exercise at least 30 minutes per day on most days of the week  Some examples of exercise include walking, biking, dancing, and swimming  You can also fit in more physical activity by taking the stairs instead of the elevator or parking farther away from stores  Include muscle strengthening activities 2 days each week   Regular exercise provides many health benefits  It helps you manage your weight, and decreases your risk for type 2 diabetes, heart disease, stroke, and high blood pressure  Exercise can also help improve your mood  Ask your healthcare provider about the best exercise plan for you  What are some general health and safety guidelines I should follow? · Do not smoke  Nicotine and other chemicals in cigarettes and cigars can cause lung damage  Ask your healthcare provider for information if you currently smoke and need help to quit  E-cigarettes or smokeless tobacco still contain nicotine  Talk to your healthcare provider before you use these products  · Limit alcohol  A drink of alcohol is 12 ounces of beer, 5 ounces of wine, or 1½ ounces of liquor  · Lose weight, if needed  Being overweight increases your risk of certain health conditions  These include heart disease, high blood pressure, type 2 diabetes, and certain types of cancer  · Protect your skin  Do not sunbathe or use tanning beds  Use sunscreen with a SPF 15 or higher  Apply sunscreen at least 15 minutes before you go outside  Reapply sunscreen every 2 hours  Wear protective clothing, hats, and sunglasses when you are outside  · Drive safely  Always wear your seatbelt  Make sure everyone in your car wears a seatbelt  A seatbelt can save your life if you are in an accident  Do not use your cell phone when you are driving  This could distract you and cause an accident  Pull over if you need to make a call or send a text message  · Practice safe sex  Use latex condoms if are sexually active and have more than one partner  Your healthcare provider may recommend screening tests for sexually transmitted infections (STIs)  · Wear helmets, lifejackets, and protective gear  Always wear a helmet when you ride a bike or motorcycle, go skiing, or play sports that could cause a head injury  Wear protective equipment when you play sports   Wear a lifejacket when you are on a boat or doing water sports  CARE AGREEMENT:   You have the right to help plan your care  Learn about your health condition and how it may be treated  Discuss treatment options with your healthcare providers to decide what care you want to receive  You always have the right to refuse treatment  The above information is an  only  It is not intended as medical advice for individual conditions or treatments  Talk to your doctor, nurse or pharmacist before following any medical regimen to see if it is safe and effective for you  © Copyright [address] Information is for End User's use only and may not be sold, redistributed or otherwise used for commercial purposes   All illustrations and images included in CareNotes® are the copyrighted property of [employer]  or [address] [place] [address]

## 2021-04-07 ENCOUNTER — SOCIAL WORK (OUTPATIENT)
Dept: BEHAVIORAL/MENTAL HEALTH CLINIC | Facility: CLINIC | Age: 61
End: 2021-04-07
Payer: COMMERCIAL

## 2021-04-07 DIAGNOSIS — F43.10 PTSD (POST-TRAUMATIC STRESS DISORDER): ICD-10-CM

## 2021-04-07 DIAGNOSIS — F33.1 MAJOR DEPRESSIVE DISORDER, RECURRENT EPISODE, MODERATE DEGREE (HCC): Primary | ICD-10-CM

## 2021-04-07 PROCEDURE — 90837 PSYTX W PT 60 MINUTES: CPT | Performed by: SOCIAL WORKER
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
## 2021-04-07 NOTE — PSYCH
Psychotherapy Provided: Individual Psychotherapy 60  minutes 1:00 Pm to 1:56 Pm          Goals addressed in session:(Return to intake after being discharged) The Client reported that she is returning to services after increased family conflict, which has increased her anxiety and depression  " My family is causing me problems" During the session we created her recovery intake screenings on her depression, anxiety, and suicide  It is hoped that by addressing her weaknesses this will act as a preventive measure against the possible need for higher level of care services  Interventions: Supportive Therapy, Strengths Therapy,  and Cognitive Behavioral Therapy where the treatment modalities utilized during the session  Assessment and Plan: The Client presented a depressed anxious mood that was congruent in effect  She was alert, goal directed and participated with prompts during the session  The Client was oriented to person, place, time, situation, and reported no suicidal/homicidal ideation, plan, or intent  As team we created her recovery intake, and conducted the depression screening PHQ-9 with the Client scoring in the severe depression range  On the anxiety screening CHRISTOPHER-7 the Client scored in the severe anxiety severity range  She had a negative screening for suicide on the screening on the ask suicide screening questions  As her home commitment the Client and her support team set for her to practice her coping skills when presented with anxiety and or depression  Next scheduled appointment was set 2 weeks  Pain:      PSYCH MENTAL STATUS PAIN :7 as reported by the Client     PHYSICAL PAIN SCALE NUMBERS:6 as reported by the Client     Current suicide risk : Low/Phone number for [facility] was given  to the Client [phone]  The Client was given phone number for the [facility] [phone]           Behavioral Health Treatment Plan [facility]: Diagnosis and Treatment Plan explained to Sam 6  relates understanding diagnosis and is agreeable to Treatment Plan  Yes

## 2021-04-15 DIAGNOSIS — C34.91 ADENOSQUAMOUS CARCINOMA OF RIGHT LUNG (HCC): ICD-10-CM

## 2021-04-15 DIAGNOSIS — C34.91 NON-SMALL CELL CANCER OF RIGHT LUNG (HCC): ICD-10-CM

## 2021-04-15 DIAGNOSIS — F51.04 CHRONIC INSOMNIA: ICD-10-CM

## 2021-04-15 DIAGNOSIS — F41.9 ANXIETY: ICD-10-CM

## 2021-04-15 RX ORDER — OXYCODONE HYDROCHLORIDE 5 MG/1
5 TABLET ORAL EVERY 4 HOURS PRN
Qty: 180 TABLET | Refills: 0 | Status: SHIPPED | OUTPATIENT
Start: 2021-04-15 | End: 2021-05-12 | Stop reason: SDUPTHER

## 2021-04-15 RX ORDER — TEMAZEPAM 30 MG/1
30 CAPSULE ORAL
Qty: 30 CAPSULE | Refills: 0 | Status: SHIPPED | OUTPATIENT
Start: 2021-04-15 | End: 2021-05-12 | Stop reason: SDUPTHER

## 2021-04-15 RX ORDER — ALPRAZOLAM 1 MG/1
2 TABLET ORAL 3 TIMES DAILY PRN
Qty: 180 TABLET | Refills: 0 | Status: SHIPPED | OUTPATIENT
Start: 2021-04-15 | End: 2021-05-12 | Stop reason: SDUPTHER

## 2021-04-22 ENCOUNTER — SOCIAL WORK (OUTPATIENT)
Dept: BEHAVIORAL/MENTAL HEALTH CLINIC | Facility: CLINIC | Age: 61
End: 2021-04-22
Payer: COMMERCIAL

## 2021-04-22 DIAGNOSIS — F33.1 MAJOR DEPRESSIVE DISORDER, RECURRENT EPISODE, MODERATE DEGREE (HCC): Primary | ICD-10-CM

## 2021-04-22 DIAGNOSIS — F43.10 PTSD (POST-TRAUMATIC STRESS DISORDER): ICD-10-CM

## 2021-04-22 PROCEDURE — 90837 PSYTX W PT 60 MINUTES: CPT | Performed by: SOCIAL WORKER

## 2021-04-22 NOTE — PSYCH
Psychotherapy Provided: Individual Psychotherapy 60  minutes 1:40 Pm to 2:40 Pm          Goals addressed in session:(Creation of 2nd treatment Plan) The Client reported continued problems within her primary relationships, which has caused continued anxiety and depression  " my family is driving me crazy" During the session we created her recovery treatment plan  It is hoped that by addressing her weaknesses this will act as a preventive measure against the possible need for higher level of care and services  Interventions: Supportive Therapy, Strengths Therapy,  and Cognitive Behavioral Therapy where the treatment modalities utilized during the session  Assessment and Plan: The Client presented a depressed anxious mood that was congruent in effect  She was alert, goal directed and participated with prompts during the session  The Client was oriented to person, place, time, situation, and reported no suicidal/homicidal ideation, plan, or intent  As team we created the Client's recovery treatment plan, which will cover the next 12 visits or 4 months which ever comes first  During the session we reviewed her coping skills, with the client reporting feeling calmer  As her home commitment the Client will practice her coping skills when presented with anxiety and or depression  Next appointment was set for 2 weeks  Pain:      PSYCH MENTAL STATUS PAIN :8 as reported by the Client     PHYSICAL PAIN SCALE NUMBERS:8 as reported by the Client     Current suicide risk : Low/Phone number for [facility] was given  to the Client [phone]  The Client was given phone number for the [facility] [phone]  Behavioral Health Treatment Plan [facility]: Diagnosis and Treatment Plan explained to Sam 6  relates understanding diagnosis and is agreeable to Treatment Plan  Yes

## 2021-04-22 NOTE — PSYCH
Psych  Sensitive     Celestine Cardoso  1960         Date of Initial Treatment Plan: 10/09/20  Date of Current Treatment Plan: 10/09/20 (returned to services on 04/07/2021)     Treatment Plan Number: 2nd Treatment Plan     Strengths/Personal Resources for Self Care: The Client has a strong family of origin, and prior to her disability she had a strong competitive employment history  She presently is being seen for her physical health care with Dr Margie West at the [facility], and for mental health therapy with Mariela LOPEZW at [place] [address]       Diagnosis:   1  Major depressive disorder, recurrent episode, moderate degree (HCC)      2  PTSD (post-traumatic stress disorder)            Area of Needs: The Client reported that she would address her depression and anxiety, over the tenure of her present treatment plan  It is hoped that by addressing her weaknesses the Client will achieve or maintain maximum functional capacity in performing daily activizes, taking into account both the functional capacity of the individual and those functional capacities appropriate for the individuals of the same age  Reduce or ameliorate the physical mental, behavioral, or developmental effects of an illness, condition, injury or disability   Present treatment plan will cover 4 months or 12 visits which ever comes first                Long Term Goal 1: The Client's depression score on her PHQ-9 will decrease from 23 to 10 or less     Target Date: 10/09/2021  Completion Date:          Short Term Objectives for Goal 1: The Client will learn 3 coping skills to address her depression that is effecting her different relationships and environments       Long Term Goal 2: The Client's anxiety score on her CHRISTOPHER-7 will decrease from 18 to 9 or less     Target Date: 10/09/2021  Completion Date:      Short Term Objectives for Goal 2: The Client will learn 4 coping skills to address her anxiety that is effecting her different relationships and environments            Long Term Goal # 3: The Client will attend all of her scheduled medical appointments 100% of the time      Target Date: 10/09/2021  Completion Date:            GOAL 1: Modality: The person(s) responsible for carrying out the plan is  the Client and this therapist Leobardo DAWSON LCSW     GOAL 2: Modality: The person(s) responsible for carrying out the plan is  The Client and this Therapist Leobardo Jacobo LCSW      GOAL 3: Modality: The person(s) responsible for carrying out the plan is  The CLient         [address]: Diagnosis and Treatment Plan explained to Rossana Lopez relates understanding diagnosis and is agreeable to Treatment Plan          Client Comments : Please share your thoughts, feelings, need and/or experiences regarding your treatment plan:    The Client's short term goals will be reviewed and or completed on or before 08/22/2021  __________________________________________________________________     __________________________________________________________________     __________________________________________________________________     __________________________________________________________________     _______________________________________                 Patient signature, Date Time: __________________________________________        Physician cosigner signature, Date, Time: ________________________________

## 2021-05-12 DIAGNOSIS — F51.04 CHRONIC INSOMNIA: ICD-10-CM

## 2021-05-12 DIAGNOSIS — F41.9 ANXIETY: ICD-10-CM

## 2021-05-12 DIAGNOSIS — C34.91 ADENOSQUAMOUS CARCINOMA OF RIGHT LUNG (HCC): ICD-10-CM

## 2021-05-12 DIAGNOSIS — C34.91 NON-SMALL CELL CANCER OF RIGHT LUNG (HCC): ICD-10-CM

## 2021-05-12 RX ORDER — TEMAZEPAM 30 MG/1
30 CAPSULE ORAL
Qty: 30 CAPSULE | Refills: 0 | Status: SHIPPED | OUTPATIENT
Start: 2021-05-12 | End: 2021-05-13 | Stop reason: SDUPTHER

## 2021-05-12 RX ORDER — OXYCODONE HYDROCHLORIDE 5 MG/1
5 TABLET ORAL EVERY 4 HOURS PRN
Qty: 180 TABLET | Refills: 0 | Status: SHIPPED | OUTPATIENT
Start: 2021-05-12 | End: 2021-05-13 | Stop reason: SDUPTHER

## 2021-05-12 RX ORDER — ALPRAZOLAM 1 MG/1
2 TABLET ORAL 3 TIMES DAILY PRN
Qty: 180 TABLET | Refills: 0 | Status: SHIPPED | OUTPATIENT
Start: 2021-05-12 | End: 2021-05-13 | Stop reason: SDUPTHER

## 2021-05-13 DIAGNOSIS — C34.91 ADENOSQUAMOUS CARCINOMA OF RIGHT LUNG (HCC): ICD-10-CM

## 2021-05-13 DIAGNOSIS — F51.04 CHRONIC INSOMNIA: ICD-10-CM

## 2021-05-13 DIAGNOSIS — F41.9 ANXIETY: ICD-10-CM

## 2021-05-13 DIAGNOSIS — C34.91 NON-SMALL CELL CANCER OF RIGHT LUNG (HCC): ICD-10-CM

## 2021-05-13 RX ORDER — ALPRAZOLAM 1 MG/1
2 TABLET ORAL 3 TIMES DAILY PRN
Qty: 180 TABLET | Refills: 0 | Status: SHIPPED | OUTPATIENT
Start: 2021-05-13 | End: 2021-06-09 | Stop reason: SDUPTHER

## 2021-05-13 RX ORDER — TEMAZEPAM 30 MG/1
30 CAPSULE ORAL
Qty: 30 CAPSULE | Refills: 0 | Status: SHIPPED | OUTPATIENT
Start: 2021-05-13 | End: 2021-06-09 | Stop reason: SDUPTHER

## 2021-05-13 RX ORDER — OXYCODONE HYDROCHLORIDE 5 MG/1
5 TABLET ORAL EVERY 4 HOURS PRN
Qty: 180 TABLET | Refills: 0 | Status: SHIPPED | OUTPATIENT
Start: 2021-05-13 | End: 2021-06-11 | Stop reason: SDUPTHER

## 2021-05-28 DIAGNOSIS — J43.1 PANLOBULAR EMPHYSEMA (HCC): Primary | ICD-10-CM

## 2021-05-28 RX ORDER — TIOTROPIUM BROMIDE 18 UG/1
CAPSULE ORAL; RESPIRATORY (INHALATION)
Qty: 30 CAPSULE | Refills: 11 | Status: SHIPPED | OUTPATIENT
Start: 2021-05-28 | End: 2021-11-24 | Stop reason: SDUPTHER

## 2021-06-09 ENCOUNTER — RA CDI HCC (OUTPATIENT)
Dept: OTHER | Facility: HOSPITAL | Age: 61
End: 2021-06-09

## 2021-06-09 DIAGNOSIS — F51.04 CHRONIC INSOMNIA: ICD-10-CM

## 2021-06-09 DIAGNOSIS — F41.9 ANXIETY: ICD-10-CM

## 2021-06-09 RX ORDER — TEMAZEPAM 30 MG/1
30 CAPSULE ORAL
Qty: 30 CAPSULE | Refills: 0 | Status: SHIPPED | OUTPATIENT
Start: 2021-06-09 | End: 2021-07-07

## 2021-06-09 RX ORDER — ALPRAZOLAM 1 MG/1
2 TABLET ORAL 3 TIMES DAILY PRN
Qty: 180 TABLET | Refills: 0 | Status: SHIPPED | OUTPATIENT
Start: 2021-06-09 | End: 2021-06-11 | Stop reason: SDUPTHER

## 2021-06-09 NOTE — PROGRESS NOTES
[facility]          Chart reviewed, no opportunity found: CHART REVIEWED, NO OPPORTUNITY FOUND              Patients insurance company: [facility]

## 2021-06-10 RX ORDER — PREDNISONE 20 MG/1
TABLET ORAL
COMMUNITY
Start: 2021-04-12 | End: 2021-10-26 | Stop reason: ALTCHOICE

## 2021-06-11 ENCOUNTER — OFFICE VISIT (OUTPATIENT)
Dept: FAMILY MEDICINE CLINIC | Facility: CLINIC | Age: 61
End: 2021-06-11
Payer: COMMERCIAL

## 2021-06-11 VITALS
DIASTOLIC BLOOD PRESSURE: 76 MMHG | WEIGHT: 139 LBS | BODY MASS INDEX: 22.34 KG/M2 | SYSTOLIC BLOOD PRESSURE: 122 MMHG | TEMPERATURE: 98.4 F | HEIGHT: 66 IN | OXYGEN SATURATION: 95 % | HEART RATE: 93 BPM

## 2021-06-11 DIAGNOSIS — Z12.12 SCREENING FOR COLORECTAL CANCER: ICD-10-CM

## 2021-06-11 DIAGNOSIS — E44.0 MODERATE PROTEIN-CALORIE MALNUTRITION (HCC): ICD-10-CM

## 2021-06-11 DIAGNOSIS — F11.90 OPIOID USE: ICD-10-CM

## 2021-06-11 DIAGNOSIS — F32.2 SEVERE DEPRESSION (HCC): ICD-10-CM

## 2021-06-11 DIAGNOSIS — J43.1 PANLOBULAR EMPHYSEMA (HCC): ICD-10-CM

## 2021-06-11 DIAGNOSIS — Z12.31 ENCOUNTER FOR SCREENING MAMMOGRAM FOR BREAST CANCER: ICD-10-CM

## 2021-06-11 DIAGNOSIS — C34.91 NON-SMALL CELL CANCER OF RIGHT LUNG (HCC): ICD-10-CM

## 2021-06-11 DIAGNOSIS — Z12.11 SCREENING FOR COLORECTAL CANCER: ICD-10-CM

## 2021-06-11 DIAGNOSIS — F41.9 ANXIETY: ICD-10-CM

## 2021-06-11 DIAGNOSIS — Z00.00 MEDICARE ANNUAL WELLNESS VISIT, SUBSEQUENT: Primary | ICD-10-CM

## 2021-06-11 DIAGNOSIS — K86.1 OTHER CHRONIC PANCREATITIS (HCC): ICD-10-CM

## 2021-06-11 DIAGNOSIS — F51.04 CHRONIC INSOMNIA: ICD-10-CM

## 2021-06-11 DIAGNOSIS — Z12.4 SCREENING FOR CERVICAL CANCER: ICD-10-CM

## 2021-06-11 DIAGNOSIS — C34.91 ADENOSQUAMOUS CARCINOMA OF RIGHT LUNG (HCC): ICD-10-CM

## 2021-06-11 PROBLEM — J96.01 ACUTE RESPIRATORY FAILURE WITH HYPOXIA (HCC): Status: RESOLVED | Noted: 2020-07-31 | Resolved: 2021-06-11

## 2021-06-11 PROCEDURE — 1036F TOBACCO NON-USER: CPT | Performed by: FAMILY MEDICINE

## 2021-06-11 PROCEDURE — G0439 PPPS, SUBSEQ VISIT: HCPCS | Performed by: FAMILY MEDICINE

## 2021-06-11 PROCEDURE — 99214 OFFICE O/P EST MOD 30 MIN: CPT | Performed by: FAMILY MEDICINE

## 2021-06-11 PROCEDURE — 3008F BODY MASS INDEX DOCD: CPT | Performed by: FAMILY MEDICINE

## 2021-06-11 PROCEDURE — 3725F SCREEN DEPRESSION PERFORMED: CPT | Performed by: FAMILY MEDICINE

## 2021-06-11 RX ORDER — OXYCODONE HYDROCHLORIDE 5 MG/1
5 TABLET ORAL EVERY 4 HOURS PRN
Qty: 180 TABLET | Refills: 0 | Status: SHIPPED | OUTPATIENT
Start: 2021-06-11 | End: 2021-07-27 | Stop reason: SDUPTHER

## 2021-06-11 RX ORDER — DEXTROMETHORPHAN HYDROBROMIDE AND PROMETHAZINE HYDROCHLORIDE 15; 6.25 MG/5ML; MG/5ML
5 SOLUTION ORAL 4 TIMES DAILY PRN
Qty: 240 ML | Refills: 5 | Status: SHIPPED | OUTPATIENT
Start: 2021-06-11 | End: 2021-10-26 | Stop reason: ALTCHOICE

## 2021-06-11 RX ORDER — ALPRAZOLAM 1 MG/1
2 TABLET ORAL 3 TIMES DAILY PRN
Qty: 180 TABLET | Refills: 0 | Status: SHIPPED | OUTPATIENT
Start: 2021-06-11 | End: 2021-08-02 | Stop reason: SDUPTHER

## 2021-06-11 RX ORDER — NALOXONE HYDROCHLORIDE 4 MG/.1ML
SPRAY NASAL
Qty: 1 EACH | Refills: 1 | Status: SHIPPED | OUTPATIENT
Start: 2021-06-11 | End: 2022-02-24

## 2021-06-11 NOTE — PROGRESS NOTES
Assessment and Plan:     Problem List Items Addressed This Visit     None      Visit Diagnoses     Encounter for screening mammogram for breast cancer    -  Primary    Screening for cervical cancer        Screening for colorectal cancer               Preventive health issues were discussed with patient, and age appropriate screening tests were ordered as noted in patient's After Visit Summary  Personalized health advice and appropriate referrals for health education or preventive services given if needed, as noted in patient's After Visit Summary       History of Present Illness:     Patient presents for Medicare Annual Wellness visit    Patient Care Team:  Dede Villar MD as PCP - General (Family Medicine)  Dede Villar MD as PCP - [address] [place], [place] [id number] [facility] ([facility])  Lyndsay Valdivia MD (Oncology)  Forest Zhou MD (Radiation Oncology)  [facility] (Ophthalmology)     Problem List:     Patient Active Problem List   Diagnosis    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[address] )    Anemia    Asthma    Chronic pancreatitis ([address] )    COPD with emphysema ([address] )    Degeneration of lumbosacral intervertebral disc    Dyslipidemia    Endobronchial mass    Fibromyalgia    Gastroesophageal reflux disease    Non-small cell cancer of right lung ([address] )    Restless legs syndrome    Anxiety    Protein calorie malnutrition ([address] )    Chronic cough    Chronic insomnia    Acute respiratory failure with hypoxia (HCC)    Severe depression (HCC)    PTSD (post-traumatic stress disorder)      Past Medical and Surgical History:     Past Medical History:   Diagnosis Date    Cancer ([address] )     Diabetes mellitus ([address] )     borderline    Irregular heartbeat     Pancreatitis     Pre cancerous lesions per Elvera Laurie     Past Surgical History:   Procedure Laterality Date    BRONCHOSCOPY      CHOLECYSTECTOMY        Family History:     Family History   Problem Relation Age of Onset    Cancer Mother     COPD Mother    Ruma Nine Diabetes Maternal Grandfather       Social History:        Social History     Socioeconomic History    Marital status: /Civil Union     Spouse name: Not on file    Number of children: Not on file    Years of education: Not on file    Highest education level: Not on file   Occupational History    Not on file   Social Needs    Financial resource strain: Not on file    Food insecurity     Worry: Not on file     Inability: Not on file   [employer] needs     Medical: Not on file     Non-medical: Not on file   Tobacco Use    Smoking status: Former Smoker     Packs/day: 2 00     Years: 40 00     Pack years: 80 00     Types: Cigarettes     Quit date: 2019     Years since quittin 4    Smokeless tobacco: Never Used   Substance and Sexual Activity    Alcohol use: No    Drug use: No    Sexual activity: Not on file   Lifestyle    Physical activity     Days per week: Not on file     Minutes per session: Not on file    Stress: Not on file   Relationships    Social connections     Talks on phone: Not on file     Gets together: Not on file     Attends Christian service: Not on file     Active member of club or organization: Not on file     Attends meetings of clubs or organizations: Not on file     Relationship status: Not on file    Intimate partner violence     Fear of current or ex partner: Not on file     Emotionally abused: Not on file     Physically abused: Not on file     Forced sexual activity: Not on file   Other Topics Concern    Not on file   Social History Narrative    Not on file      Medications and Allergies:     Current Outpatient Medications   Medication Sig Dispense Refill    ALPRAZolam (XANAX) 1 mg tablet Take 2 tablets (2 mg total) by mouth 3 (three) times a day as needed for anxiety 180 tablet 0    levalbuterol (XOPENEX HFA) 45 mcg/act inhaler Inhale 1-2 puffs every 4 (four) hours as needed for wheezing or shortness of breath 1 Inhaler 5    oxyCODONE (ROXICODONE) 5 mg immediate release tablet Take 1 tablet (5 mg total) by mouth every 4 (four) hours as needed for moderate pain or severe painMax Daily Amount: 30 mg 180 tablet 0    predniSONE 20 mg tablet       sertraline (ZOLOFT) 100 mg tablet Take 1 5 tablets (150 mg total) by mouth daily at bedtime 150 tablet 3    Spiriva HandiHaler 18 MCG inhalation capsule INHALE 1 CAPSULE USING THE HANDIHALER (18 MCG TOTAL) DAILY  30 capsule 11    temazepam (RESTORIL) 30 mg capsule Take 1 capsule (30 mg total) by mouth daily at bedtime 30 capsule 0    Tiotropium Bromide Monohydrate (SPIRIVA HANDIHALER IN) Inhale 2 puffs as needed       No current facility-administered medications for this visit  Allergies   Allergen Reactions    Sulfa Antibiotics Rash and Palpitations    Ephraim Flavor - Food Allergy Hives    Antihistamine & Nasal Deconges [Fexofenadine-Pseudoephed Er]     Codeine     Lisinopril Cough    Mangifera Indica     Nsaids      daypro, orudis    can take motrin    Other      Steroid shots:  Heart race, shakes    Poison Ivy Extract      Other reaction(s): Other (See Comments)  Steroid shots:  Heart race, shakes  Mangoes: rash    Naproxen Rash    Prednisone Palpitations      Immunizations:     Immunization History   Administered Date(s) Administered    Pneumococcal Polysaccharide PPV23 01/20/2021    Tdap 08/02/2007      Health Maintenance:         Topic Date Due    MAMMOGRAM  Never done    HIV Screening  Never done    Cervical Cancer Screening  Never done    Colorectal Cancer Screening  Never done    Hepatitis C Screening  Completed         Topic Date Due    COVID-19 Vaccine (1) Never done    DTaP,Tdap,and Td Vaccines (2 - Td) 08/02/2017    Influenza Vaccine (Season Ended) 09/01/2021      Medicare Health Risk Assessment:     There were no vitals taken for this visit  Spike Carlos is here for her Subsequent Wellness visit   Last Medicare Wellness visit information reviewed, patient interviewed, no change since last AWV  Health Risk Assessment:   Patient rates overall health as poor  Patient feels that their physical health rating is slightly worse  Patient is dissatisfied with their life  Eyesight was rated as slightly worse  Hearing was rated as slightly worse  Patient feels that their emotional and mental health rating is slightly worse  Patients states they are sometimes angry  Patient states they are often unusually tired/fatigued  Pain experienced in the last 7 days has been a lot  Patient's pain rating has been 8/10  Patient states that she has experienced no weight loss or gain in last 6 months  Depression Screening:   PHQ-2 Score: 3      Fall Risk Screening: In the past year, patient has experienced: no history of falling in past year      Urinary Incontinence Screening:   Patient has not leaked urine accidently in the last six months  Home Safety:  Patient has trouble with stairs inside or outside of their home  Patient has working smoke alarms and has working carbon monoxide detector  Home safety hazards include: none  Nutrition:   Current diet is Regular  Medications:   Patient is currently taking over-the-counter supplements  OTC medications include: see medication list  Patient is able to manage medications  Activities of Daily Living (ADLs)/Instrumental Activities of Daily Living (IADLs):   Walk and transfer into and out of bed and chair?: Yes  Dress and groom yourself?: Yes    Bathe or shower yourself?: Yes    Feed yourself?  Yes  Do your laundry/housekeeping?: No  Manage your money, pay your bills and track your expenses?: Yes  Make your own meals?: Yes    Do your own shopping?: No    Previous Hospitalizations:   Any hospitalizations or ED visits within the last 12 months?: No      Advance Care Planning:   Living will: Yes    Advanced directive: Yes      Cognitive Screening:   Provider or family/friend/caregiver concerned regarding cognition?: No    PREVENTIVE SCREENINGS Cardiovascular Screening:    General: Screening Not Indicated and History Lipid Disorder      Diabetes Screening:     General: Risks and Benefits Discussed      Colorectal Cancer Screening:     General: Risks and Benefits Discussed and Patient Declines      Breast Cancer Screening:     General: Patient Declines and Risks and Benefits Discussed      Cervical Cancer Screening:    General: Risks and Benefits Discussed and Patient Declines      Osteoporosis Screening:    General: Risks and Benefits Discussed and Patient Declines      Abdominal Aortic Aneurysm (AAA) Screening:        General: Screening Not Indicated      Lung Cancer Screening:     General: Screening Not Indicated and History Lung Cancer      Hepatitis C Screening:    General: Screening Current    Screening, Brief Intervention, and Referral to Treatment (SBIRT)    Screening  Typical number of drinks in a day: 0  Typical number of drinks in a week: 0  Interpretation: Low risk drinking behavior      Single Item Drug Screening:  How often have you used an illegal drug (including marijuana) or a prescription medication for non-medical reasons in the past year? never    Single Item Drug Screen Score: 0  Interpretation: Negative screen for possible drug use disorder    Review of Current Opioid Use    Opioid Risk Tool (ORT) Interpretation: Complete Opioid Risk Tool (ORT)      Suyapa Odom MD

## 2021-06-11 NOTE — PROGRESS NOTES
Assessment/Plan:      Diagnoses and all orders for this visit:    Encounter for screening mammogram for breast cancer  -     Mammo screening bilateral w 3d & cad; Future    Screening for cervical cancer  -     Ambulatory referral to Obstetrics / Gynecology; Future    Screening for colorectal cancer  -     Cologuard; Future    Other chronic pancreatitis (HCC)    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HCC)    Panlobular emphysema (HCC)    Non-small cell cancer of right lung (HCC)    Moderate protein-calorie malnutrition (HCC)    Severe depression ([address] )    Other orders  -     predniSONE 20 mg tablet          Subjective:     Patient ID: Klever Hong is a 63 y o  female  She has increased depression and anxiety  She is under a great deal of stress both at home and in her personal life  She is dealing with treatment of two types of cancer  She is taking 2 mg of Xanax three times daily plus 100 mg of Zoloft  She has been on Xanax for at least 5 years  She does not do well on a higher dose of Zoloft  She has tried a number of other medications such as Abilify and Seroquel  She does have a counselor  She has a chronic cough related to her cancer and radiation therapy  Her cough is non-productive  She has no F/C  She takes 5 mg of oxycodone  She takes it "as directed "  She has a number of reasons for chronic pain including knee pain, chronic pancreatitis, back pain, and fibromyalgia  She has a signed treatment agreement in the chart  She has urine toxicology consistent with regular use of her prescribed medications        Review of Systems      Objective:     Physical Exam

## 2021-06-24 DIAGNOSIS — J43.1 PANLOBULAR EMPHYSEMA (HCC): ICD-10-CM

## 2021-06-24 RX ORDER — LEVALBUTEROL TARTRATE 45 UG/1
1-2 AEROSOL, METERED ORAL EVERY 4 HOURS PRN
Qty: 15 G | Refills: 5 | Status: SHIPPED | OUTPATIENT
Start: 2021-06-24 | End: 2021-12-23 | Stop reason: SDUPTHER

## 2021-07-07 DIAGNOSIS — F51.04 CHRONIC INSOMNIA: ICD-10-CM

## 2021-07-07 RX ORDER — TEMAZEPAM 30 MG/1
30 CAPSULE ORAL
Qty: 30 CAPSULE | Refills: 0 | Status: SHIPPED | OUTPATIENT
Start: 2021-07-07 | End: 2021-07-27 | Stop reason: SDUPTHER

## 2021-07-16 ENCOUNTER — VBI (OUTPATIENT)
Dept: ADMINISTRATIVE | Facility: OTHER | Age: 61
End: 2021-07-16

## 2021-07-26 DIAGNOSIS — F41.8 DEPRESSION WITH ANXIETY: ICD-10-CM

## 2021-07-26 RX ORDER — SERTRALINE HYDROCHLORIDE 100 MG/1
150 TABLET, FILM COATED ORAL
Qty: 150 TABLET | Refills: 3 | Status: SHIPPED | OUTPATIENT
Start: 2021-07-26 | End: 2022-05-20 | Stop reason: SDUPTHER

## 2021-07-27 ENCOUNTER — OFFICE VISIT (OUTPATIENT)
Dept: FAMILY MEDICINE CLINIC | Facility: CLINIC | Age: 61
End: 2021-07-27
Payer: COMMERCIAL

## 2021-07-27 VITALS
OXYGEN SATURATION: 96 % | TEMPERATURE: 98.1 F | HEIGHT: 66 IN | WEIGHT: 131 LBS | BODY MASS INDEX: 21.05 KG/M2 | DIASTOLIC BLOOD PRESSURE: 76 MMHG | SYSTOLIC BLOOD PRESSURE: 124 MMHG | HEART RATE: 88 BPM

## 2021-07-27 DIAGNOSIS — F51.04 CHRONIC INSOMNIA: ICD-10-CM

## 2021-07-27 DIAGNOSIS — Z12.11 SCREENING FOR COLORECTAL CANCER: ICD-10-CM

## 2021-07-27 DIAGNOSIS — C34.91 NON-SMALL CELL CANCER OF RIGHT LUNG (HCC): ICD-10-CM

## 2021-07-27 DIAGNOSIS — Z12.12 SCREENING FOR COLORECTAL CANCER: ICD-10-CM

## 2021-07-27 DIAGNOSIS — E44.0 MODERATE PROTEIN-CALORIE MALNUTRITION (HCC): ICD-10-CM

## 2021-07-27 DIAGNOSIS — J43.1 PANLOBULAR EMPHYSEMA (HCC): ICD-10-CM

## 2021-07-27 DIAGNOSIS — Z00.00 ROUTINE GENERAL MEDICAL EXAMINATION AT A HEALTH CARE FACILITY: ICD-10-CM

## 2021-07-27 DIAGNOSIS — Z72.0 DECLINED SMOKING CESSATION: ICD-10-CM

## 2021-07-27 DIAGNOSIS — C34.91 ADENOSQUAMOUS CARCINOMA OF RIGHT LUNG (HCC): ICD-10-CM

## 2021-07-27 DIAGNOSIS — Z00.00 ROUTINE CHECK-UP: Primary | ICD-10-CM

## 2021-07-27 DIAGNOSIS — F41.8 DEPRESSION WITH ANXIETY: ICD-10-CM

## 2021-07-27 DIAGNOSIS — Z11.4 SCREENING FOR HIV (HUMAN IMMUNODEFICIENCY VIRUS): ICD-10-CM

## 2021-07-27 DIAGNOSIS — Z12.4 SCREENING FOR CERVICAL CANCER: ICD-10-CM

## 2021-07-27 PROCEDURE — 99213 OFFICE O/P EST LOW 20 MIN: CPT | Performed by: FAMILY MEDICINE

## 2021-07-27 PROCEDURE — 3008F BODY MASS INDEX DOCD: CPT | Performed by: FAMILY MEDICINE

## 2021-07-27 RX ORDER — TEMAZEPAM 30 MG/1
30 CAPSULE ORAL
Qty: 30 CAPSULE | Refills: 0 | Status: SHIPPED | OUTPATIENT
Start: 2021-07-27 | End: 2021-08-31 | Stop reason: SDUPTHER

## 2021-07-27 RX ORDER — OXYCODONE HYDROCHLORIDE 5 MG/1
5 TABLET ORAL EVERY 4 HOURS PRN
Qty: 180 TABLET | Refills: 0 | Status: SHIPPED | OUTPATIENT
Start: 2021-07-27 | End: 2021-12-11

## 2021-07-27 RX ORDER — TIOTROPIUM BROMIDE 18 UG/1
18 CAPSULE ORAL; RESPIRATORY (INHALATION) DAILY
Qty: 30 CAPSULE | Refills: 5 | Status: SHIPPED | OUTPATIENT
Start: 2021-07-27 | End: 2021-12-23 | Stop reason: SDUPTHER

## 2021-07-27 NOTE — PROGRESS NOTES
Assessment/Plan:     Problem List Items Addressed This Visit        Respiratory    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HCC)    Relevant Medications    tiotropium (Spiriva HandiHaler) 18 mcg inhalation capsule    oxyCODONE (ROXICODONE) 5 mg immediate release tablet    COPD with emphysema (HCC)    Relevant Medications    tiotropium (Spiriva HandiHaler) 18 mcg inhalation capsule    Non-small cell cancer of right lung (HCC)    Relevant Medications    tiotropium (Spiriva HandiHaler) 18 mcg inhalation capsule    oxyCODONE (ROXICODONE) 5 mg immediate release tablet       Other    Protein calorie malnutrition (HCC)    Relevant Medications    tiotropium (Spiriva HandiHaler) 18 mcg inhalation capsule    Chronic insomnia    Relevant Medications    temazepam (RESTORIL) 30 mg capsule    tiotropium (Spiriva HandiHaler) 18 mcg inhalation capsule      Other Visit Diagnoses     Routine check-up    -  Primary    Relevant Medications    tiotropium (Spiriva HandiHaler) 18 mcg inhalation capsule    Other Relevant Orders    TSH, 3rd generation    CBC and differential    Comprehensive metabolic panel    Lipid panel    Screening for HIV (human immunodeficiency virus)        Relevant Medications    tiotropium (Spiriva HandiHaler) 18 mcg inhalation capsule    Screening for cervical cancer        Relevant Medications    tiotropium (Spiriva HandiHaler) 18 mcg inhalation capsule    Other Relevant Orders    Ambulatory referral to Obstetrics / Gynecology    Screening for colorectal cancer        Relevant Medications    tiotropium (Spiriva HandiHaler) 18 mcg inhalation capsule    Other Relevant Orders    Cologuard    Depression with anxiety        Relevant Medications    temazepam (RESTORIL) 30 mg capsule    tiotropium (Spiriva HandiHaler) 18 mcg inhalation capsule    Declined smoking cessation        Routine general medical examination at a health care facility              Discussed with Dr Charli Stokes:     68-year-old female with a past medical history significant for non-small cell carcinoma of the right lower lung, adenocarcinoma of the right lung and significant emphysema presents to office today for follow-up  Patient has no acute complaints today  Patient follows up with Dr Eileen Summers, oncologist, at [facility]   Patient's vitals were all within normal limits today  Patient will be given some labs to complete including TSH, CBC, CMP, lipid panel  Additionally patient has medications refilled today  Patient denied and breast cancer screen and colon cancer screen  Follow-up in 3 months  Subjective:      Patient ID: Nhung Cabrera is a 63 y o  female with a past medical history of anxiety depression, non small cell cancer of the right lower limb, adenosquamous carcinoma of the right lung presents to the office today for follow-up  Patient follows with Dr Eileen Summers, the oncologist at [facility]  Patient doing well overall  Patient admits to shortness of breath, but denies chest pain, nausea, vomiting, diarrhea  Patient currently taking 2 mg of Xanax TID due to anxiety  Patient admits to smoking 1 5 packs a day  Patient denies recreational drugs and alcohol use  Patient has no other complaints today  The following portions of the patient's history were reviewed and updated as appropriate: allergies, current medications, past family history, past medical history, past social history, past surgical history and problem list       Review of Systems   Constitutional: Negative for chills and fever  HENT: Negative for ear pain and sore throat  Eyes: Negative for pain and visual disturbance  Respiratory: Positive for shortness of breath  Negative for cough  Cardiovascular: Negative for chest pain and palpitations  Gastrointestinal: Negative for abdominal pain and vomiting  Genitourinary: Negative for dysuria and hematuria  Musculoskeletal: Negative for arthralgias and back pain  Skin: Negative for color change and rash     Neurological: Negative for seizures and syncope  Psychiatric/Behavioral: Negative for suicidal ideas  The patient is nervous/anxious  All other systems reviewed and are negative  Objective:    /76   Pulse 88   Temp 98 1 °F (36 7 °C)   Ht 5' 6" (1 676 m)   Wt 59 4 kg (131 lb)   SpO2 96%   BMI 21 14 kg/m²        Physical Exam  Constitutional:       Appearance: Normal appearance  HENT:      Head: Normocephalic and atraumatic  Cardiovascular:      Rate and Rhythm: Normal rate and regular rhythm  Pulses: Normal pulses  Heart sounds: Normal heart sounds  Pulmonary:      Effort: Pulmonary effort is normal       Breath sounds: No wheezing, rhonchi or rales  Comments: Decreased breath sounds   Chest:      Chest wall: No tenderness  Abdominal:      General: Abdomen is flat  Bowel sounds are normal       Palpations: Abdomen is soft  Musculoskeletal:         General: No swelling or tenderness  Normal range of motion  Skin:     General: Skin is warm  Capillary Refill: Capillary refill takes less than 2 seconds  Neurological:      General: No focal deficit present  Mental Status: She is alert and oriented to person, place, and time     Psychiatric:         Mood and Affect: Mood normal          Behavior: Behavior normal

## 2021-08-02 DIAGNOSIS — F51.04 CHRONIC INSOMNIA: ICD-10-CM

## 2021-08-02 DIAGNOSIS — F41.9 ANXIETY: ICD-10-CM

## 2021-08-02 RX ORDER — ALPRAZOLAM 1 MG/1
2 TABLET ORAL 3 TIMES DAILY PRN
Qty: 180 TABLET | Refills: 0 | Status: SHIPPED | OUTPATIENT
Start: 2021-08-02 | End: 2021-08-31 | Stop reason: SDUPTHER

## 2021-08-31 DIAGNOSIS — F41.9 ANXIETY: ICD-10-CM

## 2021-08-31 DIAGNOSIS — F51.04 CHRONIC INSOMNIA: ICD-10-CM

## 2021-08-31 RX ORDER — ALPRAZOLAM 1 MG/1
2 TABLET ORAL 3 TIMES DAILY PRN
Qty: 180 TABLET | Refills: 0 | Status: SHIPPED | OUTPATIENT
Start: 2021-08-31 | End: 2021-09-28 | Stop reason: SDUPTHER

## 2021-08-31 RX ORDER — TEMAZEPAM 30 MG/1
30 CAPSULE ORAL
Qty: 30 CAPSULE | Refills: 0 | Status: SHIPPED | OUTPATIENT
Start: 2021-08-31 | End: 2021-09-28 | Stop reason: SDUPTHER

## 2021-09-23 ENCOUNTER — DOCUMENTATION (OUTPATIENT)
Dept: BEHAVIORAL/MENTAL HEALTH CLINIC | Facility: CLINIC | Age: 61
End: 2021-09-23

## 2021-09-23 NOTE — PROGRESS NOTES
Assessment/Plan:      There are no diagnoses linked to this encounter  Subjective:     Patient ID: Shnanon Ross is a 63 y o  female  Outpatient Discharge Summary:   Admission Date: 04/07/21  Abdullahi Hong was referred by originally her PCP  Discharge Date: 09/23/21    Discharge Diagnosis:    No diagnosis found  Treating Physician: [facility]/ Therapist Felicitas Bowles LCSW  Treatment Complications: lack of attendence  Presenting Problem: depression  Course of treatment includes:    individual therapy   Treatment Progress: poor  Criteria for Discharge: The Client missed a number of appointments Letter was sent without responce  Aftercare recommendations include to return mental health services if needed  Discharge Medications include:  Current Outpatient Medications:     ALPRAZolam (XANAX) 1 mg tablet, Take 2 tablets (2 mg total) by mouth 3 (three) times a day as needed for anxiety, Disp: 180 tablet, Rfl: 0    levalbuterol (XOPENEX HFA) 45 mcg/act inhaler, Inhale 1-2 puffs every 4 (four) hours as needed for wheezing or shortness of breath, Disp: 15 g, Rfl: 5    naloxone (NARCAN) 4 mg/0 1 mL nasal spray, Administer 1 spray into a nostril   If no response after 2-3 minutes, give another dose in the other nostril using a new spray , Disp: 1 each, Rfl: 1    oxyCODONE (ROXICODONE) 5 mg immediate release tablet, Take 1 tablet (5 mg total) by mouth every 4 (four) hours as needed for moderate pain or severe painMax Daily Amount: 30 mg, Disp: 180 tablet, Rfl: 0    predniSONE 20 mg tablet, , Disp: , Rfl:     Promethazine-DM (PHENERGAN-DM) 6 25-15 mg/5 mL oral syrup, Take 5 mL by mouth 4 (four) times a day as needed for cough (Patient not taking: Reported on 7/27/2021), Disp: 240 mL, Rfl: 5    sertraline (ZOLOFT) 100 mg tablet, Take 1 5 tablets (150 mg total) by mouth daily at bedtime, Disp: 150 tablet, Rfl: 3    Spiriva HandiHaler 18 MCG inhalation capsule, INHALE 1 CAPSULE USING THE HANDIHALER (18 MCG TOTAL) DAILY  , Disp: 30 capsule, Rfl: 11    temazepam (RESTORIL) 30 mg capsule, Take 1 capsule (30 mg total) by mouth daily at bedtime, Disp: 30 capsule, Rfl: 0    tiotropium (Spiriva HandiHaler) 18 mcg inhalation capsule, Place 1 capsule (18 mcg total) into inhaler and inhale daily, Disp: 30 capsule, Rfl: 5    Prognosis: poor

## 2021-09-28 DIAGNOSIS — F41.9 ANXIETY: ICD-10-CM

## 2021-09-28 DIAGNOSIS — F51.04 CHRONIC INSOMNIA: ICD-10-CM

## 2021-09-28 RX ORDER — ALPRAZOLAM 1 MG/1
2 TABLET ORAL 3 TIMES DAILY PRN
Qty: 180 TABLET | Refills: 0 | Status: SHIPPED | OUTPATIENT
Start: 2021-09-28 | End: 2021-10-27 | Stop reason: SDUPTHER

## 2021-09-28 RX ORDER — TEMAZEPAM 30 MG/1
30 CAPSULE ORAL
Qty: 30 CAPSULE | Refills: 0 | Status: SHIPPED | OUTPATIENT
Start: 2021-09-28 | End: 2021-10-27 | Stop reason: SDUPTHER

## 2021-10-27 DIAGNOSIS — F41.9 ANXIETY: ICD-10-CM

## 2021-10-27 DIAGNOSIS — F51.04 CHRONIC INSOMNIA: ICD-10-CM

## 2021-10-27 RX ORDER — TEMAZEPAM 30 MG/1
30 CAPSULE ORAL
Qty: 30 CAPSULE | Refills: 0 | Status: SHIPPED | OUTPATIENT
Start: 2021-10-27 | End: 2021-11-24 | Stop reason: SDUPTHER

## 2021-10-27 RX ORDER — ALPRAZOLAM 1 MG/1
2 TABLET ORAL 3 TIMES DAILY PRN
Qty: 180 TABLET | Refills: 0 | Status: SHIPPED | OUTPATIENT
Start: 2021-10-27 | End: 2021-11-24 | Stop reason: SDUPTHER

## 2021-11-24 ENCOUNTER — OFFICE VISIT (OUTPATIENT)
Dept: FAMILY MEDICINE CLINIC | Facility: CLINIC | Age: 61
End: 2021-11-24
Payer: COMMERCIAL

## 2021-11-24 VITALS
HEIGHT: 66 IN | DIASTOLIC BLOOD PRESSURE: 64 MMHG | BODY MASS INDEX: 21.05 KG/M2 | OXYGEN SATURATION: 97 % | SYSTOLIC BLOOD PRESSURE: 110 MMHG | WEIGHT: 131 LBS | HEART RATE: 79 BPM

## 2021-11-24 DIAGNOSIS — F41.9 ANXIETY: ICD-10-CM

## 2021-11-24 DIAGNOSIS — C34.91 NON-SMALL CELL CANCER OF RIGHT LUNG (HCC): ICD-10-CM

## 2021-11-24 DIAGNOSIS — E78.5 DYSLIPIDEMIA: ICD-10-CM

## 2021-11-24 DIAGNOSIS — L65.9 HAIR LOSS: Primary | ICD-10-CM

## 2021-11-24 DIAGNOSIS — F51.04 CHRONIC INSOMNIA: ICD-10-CM

## 2021-11-24 DIAGNOSIS — C34.91 ADENOSQUAMOUS CARCINOMA OF RIGHT LUNG (HCC): ICD-10-CM

## 2021-11-24 PROCEDURE — 99213 OFFICE O/P EST LOW 20 MIN: CPT | Performed by: FAMILY MEDICINE

## 2021-11-24 PROCEDURE — 3008F BODY MASS INDEX DOCD: CPT | Performed by: FAMILY MEDICINE

## 2021-11-24 RX ORDER — TEMAZEPAM 30 MG/1
30 CAPSULE ORAL
Qty: 30 CAPSULE | Refills: 3 | Status: SHIPPED | OUTPATIENT
Start: 2021-11-24 | End: 2021-12-23 | Stop reason: SDUPTHER

## 2021-11-24 RX ORDER — ALPRAZOLAM 1 MG/1
2 TABLET ORAL 3 TIMES DAILY PRN
Qty: 180 TABLET | Refills: 0 | Status: SHIPPED | OUTPATIENT
Start: 2021-11-24 | End: 2021-12-23 | Stop reason: SDUPTHER

## 2021-11-24 RX ORDER — ALPRAZOLAM 1 MG/1
2 TABLET ORAL 3 TIMES DAILY PRN
Qty: 180 TABLET | Refills: 0 | Status: CANCELLED | OUTPATIENT
Start: 2021-11-24

## 2021-11-24 RX ORDER — TEMAZEPAM 30 MG/1
30 CAPSULE ORAL
Qty: 30 CAPSULE | Refills: 0 | Status: CANCELLED | OUTPATIENT
Start: 2021-11-24

## 2021-12-11 ENCOUNTER — OFFICE VISIT (OUTPATIENT)
Dept: URGENT CARE | Facility: CLINIC | Age: 61
End: 2021-12-11
Payer: COMMERCIAL

## 2021-12-11 VITALS
TEMPERATURE: 97.5 F | HEART RATE: 112 BPM | RESPIRATION RATE: 18 BRPM | SYSTOLIC BLOOD PRESSURE: 139 MMHG | DIASTOLIC BLOOD PRESSURE: 71 MMHG | BODY MASS INDEX: 20.89 KG/M2 | WEIGHT: 130 LBS | HEIGHT: 66 IN | OXYGEN SATURATION: 95 %

## 2021-12-11 DIAGNOSIS — J01.11 ACUTE RECURRENT FRONTAL SINUSITIS: ICD-10-CM

## 2021-12-11 DIAGNOSIS — J02.9 SORE THROAT: Primary | ICD-10-CM

## 2021-12-11 LAB — S PYO AG THROAT QL: NEGATIVE

## 2021-12-11 PROCEDURE — 99203 OFFICE O/P NEW LOW 30 MIN: CPT | Performed by: PHYSICIAN ASSISTANT

## 2021-12-11 PROCEDURE — S9088 SERVICES PROVIDED IN URGENT: HCPCS | Performed by: PHYSICIAN ASSISTANT

## 2021-12-11 PROCEDURE — 87070 CULTURE OTHR SPECIMN AEROBIC: CPT | Performed by: PHYSICIAN ASSISTANT

## 2021-12-11 PROCEDURE — 87147 CULTURE TYPE IMMUNOLOGIC: CPT | Performed by: PHYSICIAN ASSISTANT

## 2021-12-11 RX ORDER — AMOXICILLIN AND CLAVULANATE POTASSIUM 875; 125 MG/1; MG/1
1 TABLET, FILM COATED ORAL EVERY 12 HOURS SCHEDULED
Qty: 14 TABLET | Refills: 0 | Status: SHIPPED | OUTPATIENT
Start: 2021-12-11 | End: 2021-12-18

## 2021-12-14 LAB — BACTERIA THROAT CULT: ABNORMAL

## 2021-12-16 ENCOUNTER — TELEPHONE (OUTPATIENT)
Dept: FAMILY MEDICINE CLINIC | Facility: CLINIC | Age: 61
End: 2021-12-16

## 2021-12-16 DIAGNOSIS — R05.9 COUGH: Primary | ICD-10-CM

## 2021-12-23 ENCOUNTER — OFFICE VISIT (OUTPATIENT)
Dept: FAMILY MEDICINE CLINIC | Facility: CLINIC | Age: 61
End: 2021-12-23
Payer: COMMERCIAL

## 2021-12-23 VITALS
WEIGHT: 135.2 LBS | DIASTOLIC BLOOD PRESSURE: 60 MMHG | HEART RATE: 88 BPM | HEIGHT: 66 IN | TEMPERATURE: 98.3 F | RESPIRATION RATE: 18 BRPM | SYSTOLIC BLOOD PRESSURE: 118 MMHG | OXYGEN SATURATION: 97 % | BODY MASS INDEX: 21.73 KG/M2

## 2021-12-23 DIAGNOSIS — C34.91 NON-SMALL CELL CANCER OF RIGHT LUNG (HCC): ICD-10-CM

## 2021-12-23 DIAGNOSIS — F41.9 ANXIETY: ICD-10-CM

## 2021-12-23 DIAGNOSIS — C34.91 ADENOSQUAMOUS CARCINOMA OF RIGHT LUNG (HCC): ICD-10-CM

## 2021-12-23 DIAGNOSIS — F32.2 SEVERE DEPRESSION (HCC): ICD-10-CM

## 2021-12-23 DIAGNOSIS — J43.1 PANLOBULAR EMPHYSEMA (HCC): ICD-10-CM

## 2021-12-23 DIAGNOSIS — F51.04 CHRONIC INSOMNIA: ICD-10-CM

## 2021-12-23 DIAGNOSIS — E78.2 MIXED HYPERLIPIDEMIA: Primary | ICD-10-CM

## 2021-12-23 DIAGNOSIS — R05.9 COUGH: ICD-10-CM

## 2021-12-23 PROCEDURE — 99214 OFFICE O/P EST MOD 30 MIN: CPT | Performed by: FAMILY MEDICINE

## 2021-12-23 PROCEDURE — 3008F BODY MASS INDEX DOCD: CPT | Performed by: FAMILY MEDICINE

## 2021-12-23 RX ORDER — ALPRAZOLAM 1 MG/1
2 TABLET ORAL 3 TIMES DAILY PRN
Qty: 180 TABLET | Refills: 0 | Status: SHIPPED | OUTPATIENT
Start: 2021-12-23 | End: 2022-01-19 | Stop reason: SDUPTHER

## 2021-12-23 RX ORDER — ATORVASTATIN CALCIUM 40 MG/1
40 TABLET, FILM COATED ORAL EVERY EVENING
Qty: 90 TABLET | Refills: 1 | Status: SHIPPED | OUTPATIENT
Start: 2021-12-23 | End: 2022-03-25 | Stop reason: SDUPTHER

## 2021-12-23 RX ORDER — TEMAZEPAM 30 MG/1
30 CAPSULE ORAL
Qty: 90 CAPSULE | Refills: 0 | Status: SHIPPED | OUTPATIENT
Start: 2021-12-23 | End: 2022-01-19 | Stop reason: SDUPTHER

## 2021-12-23 RX ORDER — LEVALBUTEROL TARTRATE 45 UG/1
1-2 AEROSOL, METERED ORAL EVERY 4 HOURS PRN
Qty: 15 G | Refills: 5 | Status: SHIPPED | OUTPATIENT
Start: 2021-12-23 | End: 2022-03-25 | Stop reason: SDUPTHER

## 2021-12-23 RX ORDER — TIOTROPIUM BROMIDE 18 UG/1
18 CAPSULE ORAL; RESPIRATORY (INHALATION) DAILY
Qty: 90 CAPSULE | Refills: 1 | Status: SHIPPED | OUTPATIENT
Start: 2021-12-23

## 2021-12-23 RX ORDER — HYDROCODONE POLISTIREX AND CHLORPHENIRAMINE POLISTIREX 10; 8 MG/5ML; MG/5ML
5 SUSPENSION, EXTENDED RELEASE ORAL EVERY 12 HOURS PRN
Qty: 115 ML | Refills: 0 | Status: SHIPPED | OUTPATIENT
Start: 2021-12-23 | End: 2021-12-30

## 2021-12-29 ENCOUNTER — TELEPHONE (OUTPATIENT)
Dept: FAMILY MEDICINE CLINIC | Facility: CLINIC | Age: 61
End: 2021-12-29

## 2021-12-29 DIAGNOSIS — R05.9 COUGH: Primary | ICD-10-CM

## 2021-12-30 RX ORDER — DEXTROMETHORPHAN HYDROBROMIDE AND PROMETHAZINE HYDROCHLORIDE 15; 6.25 MG/5ML; MG/5ML
5 SOLUTION ORAL 4 TIMES DAILY PRN
Qty: 473 ML | Refills: 1 | Status: SHIPPED | OUTPATIENT
Start: 2021-12-30 | End: 2022-02-17 | Stop reason: SDUPTHER

## 2022-01-03 ENCOUNTER — CLINICAL SUPPORT (OUTPATIENT)
Dept: FAMILY MEDICINE CLINIC | Facility: CLINIC | Age: 62
End: 2022-01-03

## 2022-01-03 DIAGNOSIS — C34.91 ADENOSQUAMOUS CARCINOMA OF RIGHT LUNG (HCC): ICD-10-CM

## 2022-01-03 DIAGNOSIS — C34.91 NON-SMALL CELL CANCER OF RIGHT LUNG (HCC): ICD-10-CM

## 2022-01-03 NOTE — PROGRESS NOTES
[address], Pharmacist    Delia Saunders is a 63 y o  female who was referred to the pharmacist for inhaler cost referred by Dr Srinivas Medrano  Plan       1  High cost Medication Plan  · Spiriva patient assistance program application mailed to patient's home  · Instructed her to complete her portion of the application and bring entire application to PCP's office for them to complete the provider portion and fax to program along with proof of income  Follow-up: as needed- patient has phone number for clinical pharmacist if she has any additional questions      Assessment     1  Tato Lowe / Mamta Benitez  a  Does not qualify since she is <64 yo    2  Medicare Extra Help  a  Criteria: To qualify for Extra Help, your resources must be limited to $14,790 for an individual or $29,520 for a   couple living together  b  Patient unsure if she would qualify  Mailed information on how to apply  3  Patient assistance program   a  Patient DOES program's criteria    Subjective     1  Medication cost-   · Xopenex (Tier 2 - generic on formulary); alternative albuterol also Tier 2  · Patient states this inhaler is affordable for her   · Spirivia Handihaler (Tier 3- preferred brand); altervaitves such as spiriva respimat and incruse also tier 3  · Cost $47 per month now (previously $45 per month)   · Pt states she ran out yesterday  Has not been using daily due to trying to stretch it out         Reason For Outreach  Embedded Pharmacist    Demographics  Interaction Method: Phone    Topic(s) Addressed  COPD    Intervention(s) Made      Non-Pharmacologic:    -- Adherence addressed  -- Cost    Tool(s) Used  Not Applicable    Time Spent:   Time Spent in Direct Patient Care: 10 minutes  Time Spent in Care Coordination: 10 minutes    Recommendations  Recipient: Patient/caregiver  Outcome: Cost Savings Information Provided

## 2022-01-19 DIAGNOSIS — F51.04 CHRONIC INSOMNIA: ICD-10-CM

## 2022-01-19 DIAGNOSIS — F41.9 ANXIETY: ICD-10-CM

## 2022-01-20 RX ORDER — TEMAZEPAM 30 MG/1
30 CAPSULE ORAL
Qty: 90 CAPSULE | Refills: 0 | Status: SHIPPED | OUTPATIENT
Start: 2022-01-20 | End: 2022-04-19 | Stop reason: SDUPTHER

## 2022-01-20 RX ORDER — ALPRAZOLAM 1 MG/1
2 TABLET ORAL 3 TIMES DAILY PRN
Qty: 180 TABLET | Refills: 0 | Status: SHIPPED | OUTPATIENT
Start: 2022-01-20 | End: 2022-02-17 | Stop reason: SDUPTHER

## 2022-02-17 ENCOUNTER — TELEPHONE (OUTPATIENT)
Dept: FAMILY MEDICINE CLINIC | Facility: CLINIC | Age: 62
End: 2022-02-17

## 2022-02-17 DIAGNOSIS — F51.04 CHRONIC INSOMNIA: ICD-10-CM

## 2022-02-17 DIAGNOSIS — F41.9 ANXIETY: ICD-10-CM

## 2022-02-17 DIAGNOSIS — R05.9 COUGH: ICD-10-CM

## 2022-02-17 RX ORDER — ALPRAZOLAM 1 MG/1
2 TABLET ORAL 3 TIMES DAILY PRN
Qty: 180 TABLET | Refills: 0 | Status: SHIPPED | OUTPATIENT
Start: 2022-02-17 | End: 2022-02-24

## 2022-02-17 RX ORDER — DEXTROMETHORPHAN HYDROBROMIDE AND PROMETHAZINE HYDROCHLORIDE 15; 6.25 MG/5ML; MG/5ML
5 SOLUTION ORAL 4 TIMES DAILY PRN
Qty: 473 ML | Refills: 1 | Status: SHIPPED | OUTPATIENT
Start: 2022-02-17 | End: 2022-05-13

## 2022-02-17 NOTE — TELEPHONE ENCOUNTER
Pharmacy services called  Alprazolam 2mg TID is covered  Our authorization request is for 1mg taken 2 tabs TID  Is there a reason she needs the 1mg or can we change script to covered alternative?

## 2022-02-18 DIAGNOSIS — F41.9 ANXIETY: Primary | ICD-10-CM

## 2022-02-22 RX ORDER — ALPRAZOLAM 2 MG/1
2 TABLET ORAL 3 TIMES DAILY PRN
Qty: 90 TABLET | Refills: 5 | Status: SHIPPED | OUTPATIENT
Start: 2022-02-22 | End: 2022-02-24

## 2022-02-22 NOTE — TELEPHONE ENCOUNTER
Medication:  PDMP not reviewed  Active agreement on file -yes    Please send updated dose which is covered

## 2022-02-24 ENCOUNTER — OFFICE VISIT (OUTPATIENT)
Dept: FAMILY MEDICINE CLINIC | Facility: CLINIC | Age: 62
End: 2022-02-24
Payer: COMMERCIAL

## 2022-02-24 VITALS
OXYGEN SATURATION: 97 % | DIASTOLIC BLOOD PRESSURE: 70 MMHG | HEART RATE: 90 BPM | BODY MASS INDEX: 21.05 KG/M2 | TEMPERATURE: 98.1 F | SYSTOLIC BLOOD PRESSURE: 122 MMHG | HEIGHT: 66 IN | WEIGHT: 131 LBS

## 2022-02-24 DIAGNOSIS — R55 DROP ATTACK: ICD-10-CM

## 2022-02-24 DIAGNOSIS — R29.6 FREQUENT FALLS: ICD-10-CM

## 2022-02-24 DIAGNOSIS — J43.1 PANLOBULAR EMPHYSEMA (HCC): ICD-10-CM

## 2022-02-24 DIAGNOSIS — R27.0 ATAXIA: Primary | ICD-10-CM

## 2022-02-24 DIAGNOSIS — R00.2 PALPITATIONS: ICD-10-CM

## 2022-02-24 DIAGNOSIS — F32.2 SEVERE DEPRESSION (HCC): ICD-10-CM

## 2022-02-24 DIAGNOSIS — E44.0 MODERATE PROTEIN-CALORIE MALNUTRITION (HCC): ICD-10-CM

## 2022-02-24 DIAGNOSIS — K86.1 OTHER CHRONIC PANCREATITIS (HCC): ICD-10-CM

## 2022-02-24 DIAGNOSIS — C34.91 ADENOSQUAMOUS CARCINOMA OF RIGHT LUNG (HCC): ICD-10-CM

## 2022-02-24 PROCEDURE — 3008F BODY MASS INDEX DOCD: CPT | Performed by: FAMILY MEDICINE

## 2022-02-24 PROCEDURE — 99214 OFFICE O/P EST MOD 30 MIN: CPT | Performed by: FAMILY MEDICINE

## 2022-02-24 RX ORDER — ALPRAZOLAM 0.5 MG/1
0.5 TABLET ORAL 3 TIMES DAILY PRN
COMMUNITY
End: 2022-03-17 | Stop reason: SDUPTHER

## 2022-02-24 RX ORDER — DIPHENHYDRAMINE HCL 50 MG/1
CAPSULE ORAL
COMMUNITY
Start: 2022-01-06 | End: 2022-02-24

## 2022-02-24 RX ORDER — FOLIC ACID 1 MG/1
TABLET ORAL DAILY
COMMUNITY

## 2022-02-24 RX ORDER — OMEGA-3S/DHA/EPA/FISH OIL/D3 300MG-1000
400 CAPSULE ORAL DAILY
COMMUNITY

## 2022-02-24 NOTE — PROGRESS NOTES
Assessment/Plan:    No problem-specific Assessment & Plan notes found for this encounter  Diagnoses and all orders for this visit:    Ataxia  -     MRI brain wo contrast; Future    Palpitations  -     Holter monitor; Future    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HCC)    Moderate protein-calorie malnutrition (HCC)    Panlobular emphysema (HCC)    Severe depression ([facility] )    Other chronic pancreatitis ([facility] )    Frequent falls  -     Ambulatory Referral to Physical Therapy; Future  -     Ambulatory Referral to Neurology; Future    Drop attack  -     EEG Routine and awake; Future  -     MRI brain wo mra head and neck wo; Future    Other orders  -     Discontinue: Banophen 50 MG capsule;  (Patient not taking: Reported on 2/24/2022 )  -     ALPRAZolam (XANAX) 0 5 mg tablet; Take 0 5 mg by mouth 3 (three) times a day as needed for anxiety  -     folic acid (FOLVITE) 1 mg tablet; Take by mouth daily  -     cholecalciferol (VITAMIN D3) 400 units tablet; Take 400 Units by mouth daily          Subjective:      Patient ID: Delgado Randall is a 63 y o  female  She has fallen multiple times  She usually falls to the left  She gets light-headed prior to the falls  It does not appear to be orthostatic in nature  She has no h/o of HTN or taking anti-hypertensive medications  She has no CP or palpitations  She has ringing in the left ear  There is no true LOC or seizure activity  She has no bowel or bladder incontinence  She denies N/V/D  She has a h/o lung cancer  She has fallen 4 times in the last 3 months  The following portions of the patient's history were reviewed and updated as appropriate:   She  has a past medical history of Acute respiratory failure with hypoxia ([facility] ) (7/31/2020), Cancer ([facility] ), Diabetes mellitus ([facility] ), Irregular heartbeat, and Pancreatitis    She   Patient Active Problem List    Diagnosis Date Noted    Severe depression ([facility] ) 10/01/2020    PTSD (post-traumatic stress disorder) 10/01/2020    Chronic cough 06/09/2020    Chronic insomnia 06/09/2020    Anxiety 02/15/2019    Protein calorie malnutrition ([place] [address] ) 02/15/2019    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[place] [address] ) 01/22/2019    Non-small cell cancer of right lung ([place] [address] ) 01/22/2019    Dyslipidemia 01/15/2019    Endobronchial mass 01/15/2019    Anemia 08/28/2012    Chronic pancreatitis ([place] [address] ) 08/28/2012    Fibromyalgia 08/28/2012    Gastroesophageal reflux disease 08/28/2012    Restless legs syndrome 08/28/2012    Asthma 05/07/2012    Degeneration of lumbosacral intervertebral disc 07/07/2011    COPD with emphysema ([place] [address] ) 05/29/2007     She  has a past surgical history that includes Cholecystectomy and Bronchoscopy  Her family history includes COPD in her mother; Cancer in her mother; Diabetes in her maternal grandfather  She  reports that she has been smoking cigarettes  She has a 20 00 pack-year smoking history  She has never used smokeless tobacco  She reports that she does not drink alcohol and does not use drugs    Current Outpatient Medications   Medication Sig Dispense Refill    ALPRAZolam (XANAX) 0 5 mg tablet Take 0 5 mg by mouth 3 (three) times a day as needed for anxiety      atorvastatin (LIPITOR) 40 mg tablet Take 1 tablet (40 mg total) by mouth every evening 90 tablet 1    cholecalciferol (VITAMIN D3) 400 units tablet Take 400 Units by mouth daily      folic acid (FOLVITE) 1 mg tablet Take by mouth daily      levalbuterol (XOPENEX HFA) 45 mcg/act inhaler Inhale 1-2 puffs every 4 (four) hours as needed for wheezing or shortness of breath 15 g 5    Promethazine-DM (PHENERGAN-DM) 6 25-15 mg/5 mL oral syrup Take 5 mL by mouth 4 (four) times a day as needed for cough 473 mL 1    sertraline (ZOLOFT) 100 mg tablet Take 1 5 tablets (150 mg total) by mouth daily at bedtime (Patient taking differently: Take 100 mg by mouth daily  ) 150 tablet 3    temazepam (RESTORIL) 30 mg capsule Take 1 capsule (30 mg total) by mouth daily at bedtime 90 capsule 0    tiotropium (Spiriva HandiHaler) 18 mcg inhalation capsule Place 1 capsule (18 mcg total) into inhaler and inhale daily 90 capsule 1     No current facility-administered medications for this visit  Current Outpatient Medications on File Prior to Visit   Medication Sig    ALPRAZolam (XANAX) 0 5 mg tablet Take 0 5 mg by mouth 3 (three) times a day as needed for anxiety    atorvastatin (LIPITOR) 40 mg tablet Take 1 tablet (40 mg total) by mouth every evening    cholecalciferol (VITAMIN D3) 400 units tablet Take 400 Units by mouth daily    folic acid (FOLVITE) 1 mg tablet Take by mouth daily    levalbuterol (XOPENEX HFA) 45 mcg/act inhaler Inhale 1-2 puffs every 4 (four) hours as needed for wheezing or shortness of breath    Promethazine-DM (PHENERGAN-DM) 6 25-15 mg/5 mL oral syrup Take 5 mL by mouth 4 (four) times a day as needed for cough    sertraline (ZOLOFT) 100 mg tablet Take 1 5 tablets (150 mg total) by mouth daily at bedtime (Patient taking differently: Take 100 mg by mouth daily  )    temazepam (RESTORIL) 30 mg capsule Take 1 capsule (30 mg total) by mouth daily at bedtime    tiotropium (Spiriva HandiHaler) 18 mcg inhalation capsule Place 1 capsule (18 mcg total) into inhaler and inhale daily     No current facility-administered medications on file prior to visit  She is allergic to contrast [iodinated diagnostic agents], sulfa antibiotics, cisco flavor - food allergy, antihistamine & nasal deconges [fexofenadine-pseudoephed er], codeine, lisinopril, mangifera indica, nsaids, other, poison ivy extract, naproxen, and prednisone       Review of Systems   All other systems reviewed and are negative  Objective:      /70   Pulse 90   Temp 98 1 °F (36 7 °C)   Ht 5' 6" (1 676 m)   Wt 59 4 kg (131 lb)   SpO2 97%   BMI 21 14 kg/m²          Physical Exam  Vitals and nursing note reviewed  Constitutional:       Appearance: Normal appearance   She is normal weight  HENT:      Head: Normocephalic and atraumatic  Cardiovascular:      Rate and Rhythm: Normal rate and regular rhythm  Pulses: Normal pulses  Heart sounds: Normal heart sounds  Pulmonary:      Effort: Pulmonary effort is normal    Abdominal:      General: Abdomen is flat  Bowel sounds are normal       Palpations: Abdomen is soft  Musculoskeletal:         General: Normal range of motion  Cervical back: Normal range of motion and neck supple  Skin:     General: Skin is warm and dry  Capillary Refill: Capillary refill takes less than 2 seconds  Neurological:      General: No focal deficit present  Mental Status: She is alert and oriented to person, place, and time  Mental status is at baseline  Psychiatric:         Mood and Affect: Mood normal          Behavior: Behavior normal          Thought Content:  Thought content normal          Judgment: Judgment normal

## 2022-03-16 ENCOUNTER — VBI (OUTPATIENT)
Dept: ADMINISTRATIVE | Facility: OTHER | Age: 62
End: 2022-03-16

## 2022-03-17 DIAGNOSIS — F41.9 ANXIETY: Primary | ICD-10-CM

## 2022-03-17 RX ORDER — ALPRAZOLAM 0.5 MG/1
1 TABLET ORAL 3 TIMES DAILY PRN
Qty: 180 TABLET | Refills: 0 | Status: SHIPPED | OUTPATIENT
Start: 2022-03-17 | End: 2022-03-25 | Stop reason: SDUPTHER

## 2022-03-17 NOTE — TELEPHONE ENCOUNTER
At future visit this needs to be switched to 1 mg  Dr Paul Mcpherson, can we do this? It looks as though she has been taking 0 5 mg - 2 tid  NAINA Justice, DO
Medication:  PDMP not reviewed  Active agreement on file -Yes
Per  -   She got 180 of these on 2/18 and they are written for tid  She should not be due  Can you question why she needs a refill so soon? TY        German Richards, DO
Per pt, she takes this 2 tabs TID  Pt could not tell me how many pills she had left in her bottle but states she has enough to last until Friday 
Pt requested refill on her xanax
none

## 2022-03-18 ENCOUNTER — TELEPHONE (OUTPATIENT)
Dept: FAMILY MEDICINE CLINIC | Facility: CLINIC | Age: 62
End: 2022-03-18

## 2022-03-18 NOTE — TELEPHONE ENCOUNTER
Last office visit shows you adjusted pt xanax dose    from 1mg with a sig of 2 tabs TID    was changed to 0 5mg one tablet TID  Vahe Malloy at [facility] is questioning what is her correct dose

## 2022-03-21 ENCOUNTER — RA CDI HCC (OUTPATIENT)
Dept: OTHER | Facility: HOSPITAL | Age: 62
End: 2022-03-21

## 2022-03-21 NOTE — PROGRESS NOTES
[facility] [address] [address]  coding opportunities       Chart reviewed, no opportunity found: CHART REVIEWED, NO OPPORTUNITY FOUND        Patients Insurance     Medicare Insurance: Medicare

## 2022-03-25 ENCOUNTER — OFFICE VISIT (OUTPATIENT)
Dept: FAMILY MEDICINE CLINIC | Facility: CLINIC | Age: 62
End: 2022-03-25
Payer: COMMERCIAL

## 2022-03-25 VITALS
HEART RATE: 88 BPM | BODY MASS INDEX: 21.53 KG/M2 | SYSTOLIC BLOOD PRESSURE: 124 MMHG | WEIGHT: 134 LBS | TEMPERATURE: 98 F | HEIGHT: 66 IN | DIASTOLIC BLOOD PRESSURE: 74 MMHG | OXYGEN SATURATION: 98 %

## 2022-03-25 DIAGNOSIS — E78.2 MIXED HYPERLIPIDEMIA: ICD-10-CM

## 2022-03-25 DIAGNOSIS — Z23 NEED FOR SHINGLES VACCINE: ICD-10-CM

## 2022-03-25 DIAGNOSIS — Z59.9 FINANCIAL DIFFICULTIES: Primary | ICD-10-CM

## 2022-03-25 DIAGNOSIS — J43.1 PANLOBULAR EMPHYSEMA (HCC): ICD-10-CM

## 2022-03-25 DIAGNOSIS — F32.2 SEVERE DEPRESSION (HCC): ICD-10-CM

## 2022-03-25 DIAGNOSIS — Z23 NEED FOR PNEUMOCOCCAL VACCINATION: ICD-10-CM

## 2022-03-25 DIAGNOSIS — F41.9 ANXIETY: ICD-10-CM

## 2022-03-25 PROCEDURE — 3008F BODY MASS INDEX DOCD: CPT | Performed by: FAMILY MEDICINE

## 2022-03-25 PROCEDURE — 99214 OFFICE O/P EST MOD 30 MIN: CPT | Performed by: FAMILY MEDICINE

## 2022-03-25 PROCEDURE — G0009 ADMIN PNEUMOCOCCAL VACCINE: HCPCS

## 2022-03-25 PROCEDURE — 90670 PCV13 VACCINE IM: CPT

## 2022-03-25 RX ORDER — ZOSTER VACCINE RECOMBINANT, ADJUVANTED 50 MCG/0.5
0.5 KIT INTRAMUSCULAR ONCE
Qty: 1 EACH | Refills: 1 | Status: SHIPPED | OUTPATIENT
Start: 2022-03-25 | End: 2022-03-25

## 2022-03-25 RX ORDER — ATORVASTATIN CALCIUM 40 MG/1
40 TABLET, FILM COATED ORAL EVERY EVENING
Qty: 90 TABLET | Refills: 1 | Status: SHIPPED | OUTPATIENT
Start: 2022-03-25 | End: 2022-05-20 | Stop reason: SDUPTHER

## 2022-03-25 RX ORDER — ALPRAZOLAM 1 MG/1
2 TABLET ORAL 3 TIMES DAILY PRN
Qty: 180 TABLET | Refills: 3 | Status: SHIPPED | OUTPATIENT
Start: 2022-03-25 | End: 2022-04-19 | Stop reason: SDUPTHER

## 2022-03-25 RX ORDER — LEVALBUTEROL TARTRATE 45 UG/1
1-2 AEROSOL, METERED ORAL EVERY 4 HOURS PRN
Qty: 15 G | Refills: 5 | Status: SHIPPED | OUTPATIENT
Start: 2022-03-25

## 2022-03-25 SDOH — ECONOMIC STABILITY - INCOME SECURITY: PROBLEM RELATED TO HOUSING AND ECONOMIC CIRCUMSTANCES, UNSPECIFIED: Z59.9

## 2022-03-25 NOTE — PROGRESS NOTES
Assessment/Plan:    No problem-specific Assessment & Plan notes found for this encounter  Diagnoses and all orders for this visit:    Financial difficulties  -     Ambulatory Referral to Social Work Care Management Program; Future    Mixed hyperlipidemia  -     atorvastatin (LIPITOR) 40 mg tablet; Take 1 tablet (40 mg total) by mouth every evening    Anxiety  -     ALPRAZolam (XANAX) 1 mg tablet; Take 2 tablets (2 mg total) by mouth 3 (three) times a day as needed for anxiety    Panlobular emphysema ([place] [address] )  -     Ambulatory referral to clinical pharmacy; Future    Severe depression ([place] [address] )  -     Ambulatory referral to clinical pharmacy; Future    Need for shingles vaccine  -     Zoster Vac Recomb Adjuvanted (Shingrix) 50 MCG/0 5ML SUSR; Inject 0 5 mL into a muscle once for 1 dose Repeat dose in 2 to 6 months    Need for pneumococcal vaccination  -     PNEUMOCOCCAL CONJUGATE VACCINE 13-VALENT GREATER THAN 6 MONTHS          Subjective:      Patient ID: Abiola Malik is a 63 y o  female  She is taking a large dose of Xanax  She has been on this regimen for an extended period of time  She is also taking 100 mg of Zoloft  She states that the 2 mg Xanax bars don't work for her  She prefers taking 2 x 1 mg tablets three times daily  She has significant psychosocial stress  She has financial issues as well  Her water is going to be shut off  She is tearful in the office  She has trouble affording her medications, especially her inhalers  She takes Lipitor without issues  She has no increase in myalgias or muscle weakness  Her LFTs are normal       The following portions of the patient's history were reviewed and updated as appropriate:   She  has a past medical history of Acute respiratory failure with hypoxia ([facility] ) (7/31/2020), Cancer ([facility] ), Diabetes mellitus ([facility] ), Irregular heartbeat, and Pancreatitis    She   Patient Active Problem List    Diagnosis Date Noted    Severe depression ([facility] ) 10/01/2020    PTSD (post-traumatic stress disorder) 10/01/2020    Chronic cough 06/09/2020    Chronic insomnia 06/09/2020    Anxiety 02/15/2019    Protein calorie malnutrition ([place] [address] ) 02/15/2019    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[place] [address] ) 01/22/2019    Non-small cell cancer of right lung ([place] [address] ) 01/22/2019    Dyslipidemia 01/15/2019    Endobronchial mass 01/15/2019    Anemia 08/28/2012    Chronic pancreatitis ([place] [address] ) 08/28/2012    Fibromyalgia 08/28/2012    Gastroesophageal reflux disease 08/28/2012    Restless legs syndrome 08/28/2012    Asthma 05/07/2012    Degeneration of lumbosacral intervertebral disc 07/07/2011    COPD with emphysema ([place] [address] ) 05/29/2007     She  has a past surgical history that includes Cholecystectomy and Bronchoscopy  Her family history includes COPD in her mother; Cancer in her mother; Diabetes in her maternal grandfather  She  reports that she has been smoking cigarettes  She has a 20 00 pack-year smoking history  She has never used smokeless tobacco  She reports that she does not drink alcohol and does not use drugs    Current Outpatient Medications   Medication Sig Dispense Refill    ALPRAZolam (XANAX) 1 mg tablet Take 2 tablets (2 mg total) by mouth 3 (three) times a day as needed for anxiety 180 tablet 3    cholecalciferol (VITAMIN D3) 400 units tablet Take 400 Units by mouth daily      folic acid (FOLVITE) 1 mg tablet Take by mouth daily      levalbuterol (XOPENEX HFA) 45 mcg/act inhaler Inhale 1-2 puffs every 4 (four) hours as needed for wheezing or shortness of breath 15 g 5    Promethazine-DM (PHENERGAN-DM) 6 25-15 mg/5 mL oral syrup Take 5 mL by mouth 4 (four) times a day as needed for cough 473 mL 1    sertraline (ZOLOFT) 100 mg tablet Take 1 5 tablets (150 mg total) by mouth daily at bedtime (Patient taking differently: Take 100 mg by mouth daily  ) 150 tablet 3    temazepam (RESTORIL) 30 mg capsule Take 1 capsule (30 mg total) by mouth daily at bedtime 90 capsule 0    tiotropium (Spiriva HandiHaler) 18 mcg inhalation capsule Place 1 capsule (18 mcg total) into inhaler and inhale daily 90 capsule 1    atorvastatin (LIPITOR) 40 mg tablet Take 1 tablet (40 mg total) by mouth every evening 90 tablet 1    Zoster Vac Recomb Adjuvanted (Shingrix) 50 MCG/0 5ML SUSR Inject 0 5 mL into a muscle once for 1 dose Repeat dose in 2 to 6 months 1 each 1     No current facility-administered medications for this visit  Current Outpatient Medications on File Prior to Visit   Medication Sig    cholecalciferol (VITAMIN D3) 400 units tablet Take 400 Units by mouth daily    folic acid (FOLVITE) 1 mg tablet Take by mouth daily    levalbuterol (XOPENEX HFA) 45 mcg/act inhaler Inhale 1-2 puffs every 4 (four) hours as needed for wheezing or shortness of breath    Promethazine-DM (PHENERGAN-DM) 6 25-15 mg/5 mL oral syrup Take 5 mL by mouth 4 (four) times a day as needed for cough    sertraline (ZOLOFT) 100 mg tablet Take 1 5 tablets (150 mg total) by mouth daily at bedtime (Patient taking differently: Take 100 mg by mouth daily  )    temazepam (RESTORIL) 30 mg capsule Take 1 capsule (30 mg total) by mouth daily at bedtime    tiotropium (Spiriva HandiHaler) 18 mcg inhalation capsule Place 1 capsule (18 mcg total) into inhaler and inhale daily    [DISCONTINUED] ALPRAZolam (XANAX) 0 5 mg tablet Take 2 tablets (1 mg total) by mouth 3 (three) times a day as needed for anxiety (Patient taking differently: Take 1 mg by mouth 3 (three) times a day as needed for anxiety States she is to be taking 1mg tab 2 tabs 3xs a day )    [DISCONTINUED] atorvastatin (LIPITOR) 40 mg tablet Take 1 tablet (40 mg total) by mouth every evening     No current facility-administered medications on file prior to visit       She is allergic to contrast [iodinated diagnostic agents], sulfa antibiotics, cisco flavor - food allergy, antihistamine & nasal deconges [fexofenadine-pseudoephed er], codeine, lisinopril, mangifera indica, nsaids, other, poison ivy extract, naproxen, and prednisone       Review of Systems   All other systems reviewed and are negative  Objective:      /74   Pulse 88   Temp 98 °F (36 7 °C)   Ht 5' 6" (1 676 m)   Wt 60 8 kg (134 lb)   SpO2 98%   BMI 21 63 kg/m²          Physical Exam  Vitals and nursing note reviewed  Constitutional:       Appearance: Normal appearance  She is normal weight  HENT:      Head: Normocephalic and atraumatic  Cardiovascular:      Rate and Rhythm: Normal rate and regular rhythm  Pulses: Normal pulses  Heart sounds: Normal heart sounds  Pulmonary:      Effort: Pulmonary effort is normal       Breath sounds: Normal breath sounds  Abdominal:      General: Abdomen is flat  Bowel sounds are normal       Palpations: Abdomen is soft  Musculoskeletal:         General: Normal range of motion  Cervical back: Normal range of motion and neck supple  Skin:     General: Skin is warm and dry  Capillary Refill: Capillary refill takes less than 2 seconds  Neurological:      General: No focal deficit present  Mental Status: She is alert and oriented to person, place, and time  Mental status is at baseline  Psychiatric:         Mood and Affect: Mood normal          Behavior: Behavior normal          Thought Content:  Thought content normal          Judgment: Judgment normal

## 2022-03-29 ENCOUNTER — TELEPHONE (OUTPATIENT)
Dept: FAMILY MEDICINE CLINIC | Facility: CLINIC | Age: 62
End: 2022-03-29

## 2022-03-29 NOTE — PROGRESS NOTES
[address] Danielle Bobby, Pharmacist    Vero Loya is a 63 y o  female who was referred to the pharmacist for inhaler cost referred by Dr Bev Daniel  Plan     1  High cost Medication Plan  · Spiriva patient assistance program application mailed to patient's home  · Instructed her to complete her portion of the application and bring entire application to PCP's office for them to complete the provider portion and fax to program along with proof of income  Follow-up: 2 5 weeks    Assessment     1  Patient assistance program   a  [employer] assistance program  i  Patient DOES program's income criteria    Subjective     1  Medication cost-   · Xopenex (Tier 2 - generic on formulary); alternative albuterol also Tier 2  · Patient states this inhaler is affordable for her   · Spirivia Handihaler (Tier 3- preferred brand); altervaitves such as spiriva respimat and incruse also tier 3  · Cost $47 per month now (previously $45 per month)   · Pt states she ran out yesterday  Has not been using daily due to trying to stretch it out         Reason For Outreach  Embedded Pharmacist    Demographics  Interaction Method: Phone  Type of Intervention: New    Topic(s) Addressed  COPD    Intervention(s) Made      Non-Pharmacologic:    -- Adherence addressed  -- Cost    Tool(s) Used  Not Applicable    Time Spent:   Time Spent in Direct Patient Care: 20 minutes  Time Spent in Care Coordination: 10 minutes    Recommendations  Recipient: Patient/caregiver  Outcome: Cost Savings Information Provided

## 2022-03-30 ENCOUNTER — PATIENT OUTREACH (OUTPATIENT)
Dept: FAMILY MEDICINE CLINIC | Facility: CLINIC | Age: 62
End: 2022-03-30

## 2022-03-30 NOTE — PROGRESS NOTES
Patient referred for SW CM follow up by PCP due to financial difficulties  Patient was contacted by clinical pharmacist due to inhaler cost  SW CM reviewed chart  Patient reported that her water is going to be turned off  SW CM outreached patient via telephone ([phone])  Patient did not answer  SW CM left message asking patient to return call

## 2022-04-05 ENCOUNTER — PATIENT OUTREACH (OUTPATIENT)
Dept: FAMILY MEDICINE CLINIC | Facility: CLINIC | Age: 62
End: 2022-04-05

## 2022-04-05 NOTE — PROGRESS NOTES
MONI VAUGHAN received voicemail from patient  Patient stated she was returning MONI VAUGHAN's call and asked that she call again  MONI VAUGHAN outreached patient via telephone ([phone])  Patient did not answer  MONI VAUGHAN left message asking patient to return call  Patient returned call  MONI VAUGHAN introduced herself and patient agreeable to outreach  Patient stated she cannot afford copays  Patient stated she has been denied MA and SNAP benefits are down to $20/month  She has recently started going to a local food pantry  Patient reported having medical bills  She stated that she applies for MA every six months  She is enrolled in a financial assistance program with [facility] and all of her bills are covered there  Monthly household income is $1,900  Patient is behind on  payment  Patient lives in her own home with her , Keagan Betancur   is also disabled  They do not have a mortgage payment   drives them places  Patient is independent with ADL's  Patient is interested in seeing a counselor  She does not see a psychiatrist  Patient reported taking Zoloft and Xanax prescribed by PCP  She shared that she feels supported by her sister who lives in [place]  MONI VAUGHAN will outreach [employer] [facility] to inquire about  assistance  4/14/22 to see Pulmonary Oncologist     MONI VAUGHAN outreached [facility] (([phone])  Funding for water and  is only for renters  Information was [employer]) [phone]  MONI VAUGHAN outreached [facility] - application is able to be completed through [facility]  MONI VAUGHAN outreached patient to encourage her to apply for [employer] and she has applied  Patient stated: "[facility] did not apply for the myron" so she is unable to get assistance  Patient stated she has even reached out to the local  - who also collects  and garbage payments - and was told it was too much paperwork to apply for the myron   MONI VAUGHAN will collaborate with team and follow up with patient within one week

## 2022-04-18 ENCOUNTER — CLINICAL SUPPORT (OUTPATIENT)
Dept: FAMILY MEDICINE CLINIC | Facility: CLINIC | Age: 62
End: 2022-04-18

## 2022-04-18 DIAGNOSIS — F32.2 SEVERE DEPRESSION (HCC): ICD-10-CM

## 2022-04-18 DIAGNOSIS — J43.1 PANLOBULAR EMPHYSEMA (HCC): ICD-10-CM

## 2022-04-18 NOTE — PROGRESS NOTES
[address] Danielle Bobby, Pharmacist    Saida Mckee is a 63 y o  female who was referred to the pharmacist for inhaler cost referred by Dr Raffaele Funez  Plan     High cost Medication Plan  · [employer] patient assistance program application  · Patient received application in the mail  She is in the process of filling out her portion of the application  · Advised her the next step is to take completed application to PCP office for Dr Raffaele Funez to complete his portion of the application  · Answered all questions patient had regarding filling out the application  Patient has no further questions at this time  Follow-up: as needed  Assessment     1  Patient assistance program   a  [employer] assistance program  i  Patient DOES program's income criteria    Subjective     1  Medication cost-   · Xopenex (Tier 2 - generic on formulary); alternative albuterol also Tier 2  · Patient states this inhaler is affordable for her   · Spirivia Handihaler (Tier 3- preferred brand); altervaitves such as spiriva respimat and incruse also tier 3  · Cost $47 per month now (previously $45 per month)   · Pt states she ran out yesterday  Has not been using daily due to trying to stretch it out         Reason For Outreach  Embedded Pharmacist    Demographics  Interaction Method: Phone  Type of Intervention: Follow-Up    Topic(s) Addressed  COPD    Intervention(s) Made      Non-Pharmacologic:    -- Adherence addressed  -- Cost    Tool(s) Used  Not Applicable    Time Spent:   Time Spent in Direct Patient Care: 10 minutes  Time Spent in Care Coordination: 0 minutes    Recommendations  Recipient: Patient/caregiver  Outcome: Cost Savings Information Provided

## 2022-04-19 DIAGNOSIS — F51.04 CHRONIC INSOMNIA: ICD-10-CM

## 2022-04-19 DIAGNOSIS — F41.9 ANXIETY: ICD-10-CM

## 2022-04-19 RX ORDER — ALPRAZOLAM 1 MG/1
2 TABLET ORAL 3 TIMES DAILY PRN
Qty: 180 TABLET | Refills: 3 | Status: SHIPPED | OUTPATIENT
Start: 2022-04-19 | End: 2022-04-19 | Stop reason: SDUPTHER

## 2022-04-19 RX ORDER — ALPRAZOLAM 1 MG/1
2 TABLET ORAL 3 TIMES DAILY PRN
Qty: 180 TABLET | Refills: 0 | Status: SHIPPED | OUTPATIENT
Start: 2022-04-19 | End: 2022-04-22 | Stop reason: SDUPTHER

## 2022-04-19 RX ORDER — TEMAZEPAM 30 MG/1
30 CAPSULE ORAL
Qty: 90 CAPSULE | Refills: 0 | Status: SHIPPED | OUTPATIENT
Start: 2022-04-19 | End: 2022-05-20 | Stop reason: SDUPTHER

## 2022-04-22 DIAGNOSIS — F41.9 ANXIETY: ICD-10-CM

## 2022-04-22 RX ORDER — ALPRAZOLAM 1 MG/1
2 TABLET ORAL 3 TIMES DAILY PRN
Qty: 180 TABLET | Refills: 0 | Status: SHIPPED | OUTPATIENT
Start: 2022-04-22 | End: 2022-05-20 | Stop reason: SDUPTHER

## 2022-04-22 NOTE — TELEPHONE ENCOUNTER
[facility] d/c the refill from 4/19 by mistake   He needs you to send a new script

## 2022-05-13 DIAGNOSIS — R05.9 COUGH: ICD-10-CM

## 2022-05-13 RX ORDER — DEXTROMETHORPHAN HYDROBROMIDE AND PROMETHAZINE HYDROCHLORIDE 15; 6.25 MG/5ML; MG/5ML
SYRUP ORAL
Qty: 120 ML | Refills: 1 | Status: SHIPPED | OUTPATIENT
Start: 2022-05-13 | End: 2022-05-20 | Stop reason: SDUPTHER

## 2022-05-14 ENCOUNTER — RA CDI HCC (OUTPATIENT)
Dept: OTHER | Facility: HOSPITAL | Age: 62
End: 2022-05-14

## 2022-05-15 NOTE — PROGRESS NOTES
[facility]  coding opportunities       Chart reviewed, no opportunity found:   [address]        Patients Insurance     Medicare Insurance: [employer]

## 2022-05-18 ENCOUNTER — TELEPHONE (OUTPATIENT)
Dept: FAMILY MEDICINE CLINIC | Facility: CLINIC | Age: 62
End: 2022-05-18

## 2022-05-20 ENCOUNTER — OFFICE VISIT (OUTPATIENT)
Dept: FAMILY MEDICINE CLINIC | Facility: CLINIC | Age: 62
End: 2022-05-20
Payer: COMMERCIAL

## 2022-05-20 VITALS
WEIGHT: 132 LBS | HEIGHT: 66 IN | TEMPERATURE: 98.1 F | RESPIRATION RATE: 16 BRPM | SYSTOLIC BLOOD PRESSURE: 118 MMHG | DIASTOLIC BLOOD PRESSURE: 74 MMHG | BODY MASS INDEX: 21.21 KG/M2 | OXYGEN SATURATION: 95 % | HEART RATE: 84 BPM

## 2022-05-20 DIAGNOSIS — J43.1 PANLOBULAR EMPHYSEMA (HCC): ICD-10-CM

## 2022-05-20 DIAGNOSIS — F43.10 PTSD (POST-TRAUMATIC STRESS DISORDER): ICD-10-CM

## 2022-05-20 DIAGNOSIS — E78.2 MIXED HYPERLIPIDEMIA: ICD-10-CM

## 2022-05-20 DIAGNOSIS — F51.04 CHRONIC INSOMNIA: ICD-10-CM

## 2022-05-20 DIAGNOSIS — K86.1 OTHER CHRONIC PANCREATITIS (HCC): Primary | ICD-10-CM

## 2022-05-20 DIAGNOSIS — C34.91 NON-SMALL CELL CANCER OF RIGHT LUNG (HCC): ICD-10-CM

## 2022-05-20 DIAGNOSIS — F32.2 SEVERE DEPRESSION (HCC): ICD-10-CM

## 2022-05-20 DIAGNOSIS — Z23 NEED FOR SHINGLES VACCINE: ICD-10-CM

## 2022-05-20 DIAGNOSIS — M79.7 FIBROMYALGIA: ICD-10-CM

## 2022-05-20 DIAGNOSIS — R05.9 COUGH: ICD-10-CM

## 2022-05-20 DIAGNOSIS — F41.8 DEPRESSION WITH ANXIETY: ICD-10-CM

## 2022-05-20 DIAGNOSIS — J45.909 UNCOMPLICATED ASTHMA, UNSPECIFIED ASTHMA SEVERITY, UNSPECIFIED WHETHER PERSISTENT: ICD-10-CM

## 2022-05-20 DIAGNOSIS — C34.91 ADENOSQUAMOUS CARCINOMA OF RIGHT LUNG (HCC): ICD-10-CM

## 2022-05-20 DIAGNOSIS — F41.9 ANXIETY: ICD-10-CM

## 2022-05-20 PROCEDURE — 3008F BODY MASS INDEX DOCD: CPT | Performed by: FAMILY MEDICINE

## 2022-05-20 PROCEDURE — 99214 OFFICE O/P EST MOD 30 MIN: CPT | Performed by: FAMILY MEDICINE

## 2022-05-20 RX ORDER — ZOSTER VACCINE RECOMBINANT, ADJUVANTED 50 MCG/0.5
0.5 KIT INTRAMUSCULAR ONCE
Qty: 1 EACH | Refills: 1 | Status: SHIPPED | OUTPATIENT
Start: 2022-05-20 | End: 2022-05-20

## 2022-05-20 RX ORDER — TEMAZEPAM 30 MG/1
30 CAPSULE ORAL
Qty: 90 CAPSULE | Refills: 5 | Status: SHIPPED | OUTPATIENT
Start: 2022-05-20

## 2022-05-20 RX ORDER — ALPRAZOLAM 1 MG/1
2 TABLET ORAL 3 TIMES DAILY PRN
Qty: 180 TABLET | Refills: 3 | Status: SHIPPED | OUTPATIENT
Start: 2022-05-20

## 2022-05-20 RX ORDER — DEXTROMETHORPHAN HYDROBROMIDE AND PROMETHAZINE HYDROCHLORIDE 15; 6.25 MG/5ML; MG/5ML
5 SYRUP ORAL 4 TIMES DAILY PRN
Qty: 120 ML | Refills: 5 | Status: SHIPPED | OUTPATIENT
Start: 2022-05-20 | End: 2022-07-13

## 2022-05-20 RX ORDER — ATORVASTATIN CALCIUM 40 MG/1
40 TABLET, FILM COATED ORAL EVERY EVENING
Qty: 90 TABLET | Refills: 3 | Status: SHIPPED | OUTPATIENT
Start: 2022-05-20 | End: 2022-07-20 | Stop reason: SDUPTHER

## 2022-05-20 RX ORDER — SERTRALINE HYDROCHLORIDE 100 MG/1
100 TABLET, FILM COATED ORAL DAILY
Qty: 90 TABLET | Refills: 3 | Status: SHIPPED | OUTPATIENT
Start: 2022-05-20

## 2022-05-22 NOTE — PROGRESS NOTES
Assessment/Plan:    No problem-specific Assessment & Plan notes found for this encounter  Diagnoses and all orders for this visit:    Other chronic pancreatitis (HCC)    Anxiety  -     ALPRAZolam (XANAX) 1 mg tablet; Take 2 tablets (2 mg total) by mouth as needed in the morning and 2 tablets (2 mg total) as needed at noon and 2 tablets (2 mg total) as needed in the evening for anxiety  -     Ambulatory Referral to [facility]; Future    Mixed hyperlipidemia  -     atorvastatin (LIPITOR) 40 mg tablet; Take 1 tablet (40 mg total) by mouth every evening    Cough  -     promethazine-dextromethorphan (PHENERGAN-DM) 6 25-15 mg/5 mL oral syrup; Take 5 mL by mouth as needed in the morning and 5 mL as needed at noon and 5 mL as needed in the evening and 5 mL as needed before bedtime for cough  Depression with anxiety  -     sertraline (ZOLOFT) 100 mg tablet; Take 1 tablet (100 mg total) by mouth in the morning  Chronic insomnia  -     temazepam (RESTORIL) 30 mg capsule; Take 1 capsule (30 mg total) by mouth daily at bedtime    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HCC)    Uncomplicated asthma, unspecified asthma severity, unspecified whether persistent    Panlobular emphysema (HCC)    Non-small cell cancer of right lung (HCC)    Severe depression (HCC)    Fibromyalgia    PTSD (post-traumatic stress disorder)    Need for shingles vaccine  -     Zoster Vac Recomb Adjuvanted (Shingrix) 50 MCG/0 5ML SUSR; Inject 0 5 mL into a muscle once for 1 dose Repeat dose in 2 to 6 months          Subjective:      Patient ID: Michael Cook is a 63 y o  female  Despite having lung CA, she is not interested in smoking cessation  Her chronic pancreatitis is quiet  Her COPD is stable  She has no increased cough, wheezing, sputum production, or dyspnea  She has chronic pain  She asks for an increase in her pain medication        The following portions of the patient's history were reviewed and updated as appropriate:   She  has a past medical history of Acute respiratory failure with hypoxia ([facility] ) (7/31/2020), Cancer ([place] [address] ), Diabetes mellitus ([place] [address] ), Irregular heartbeat, and Pancreatitis  She   Patient Active Problem List    Diagnosis Date Noted    Severe depression ([place] [address] ) 10/01/2020    PTSD (post-traumatic stress disorder) 10/01/2020    Chronic cough 06/09/2020    Chronic insomnia 06/09/2020    Anxiety 02/15/2019    Protein calorie malnutrition ([place] [address] ) 02/15/2019    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[facility] ) 01/22/2019    Non-small cell cancer of right lung ([place] [address] ) 01/22/2019    Dyslipidemia 01/15/2019    Endobronchial mass 01/15/2019    Anemia 08/28/2012    Chronic pancreatitis ([place] [address] ) 08/28/2012    Fibromyalgia 08/28/2012    Gastroesophageal reflux disease 08/28/2012    Restless legs syndrome 08/28/2012    Asthma 05/07/2012    Degeneration of lumbosacral intervertebral disc 07/07/2011    COPD with emphysema ([place] [address] ) 05/29/2007     She  has a past surgical history that includes Cholecystectomy and Bronchoscopy  Her family history includes COPD in her mother; Cancer in her mother; Diabetes in her maternal grandfather  She  reports that she has been smoking cigarettes  She has a 20 00 pack-year smoking history  She has never used smokeless tobacco  She reports that she does not drink alcohol and does not use drugs       Review of Systems   All other systems reviewed and are negative  Objective:      /74 (BP Location: Left arm, Patient Position: Sitting, Cuff Size: Standard)   Pulse 84   Temp 98 1 °F (36 7 °C) (Temporal)   Resp 16   Ht 5' 6" (1 676 m)   Wt 59 9 kg (132 lb)   SpO2 95%   BMI 21 31 kg/m²          Physical Exam  Vitals and nursing note reviewed  Constitutional:       Appearance: Normal appearance  She is normal weight  HENT:      Head: Normocephalic and atraumatic  Cardiovascular:      Rate and Rhythm: Normal rate and regular rhythm  Pulses: Normal pulses  Heart sounds: Normal heart sounds  Pulmonary:      Effort: Pulmonary effort is normal       Breath sounds: Normal breath sounds  Abdominal:      General: Abdomen is flat  Bowel sounds are normal       Palpations: Abdomen is soft  Musculoskeletal:         General: Normal range of motion  Cervical back: Normal range of motion and neck supple  Skin:     General: Skin is warm and dry  Capillary Refill: Capillary refill takes less than 2 seconds  Neurological:      General: No focal deficit present  Mental Status: She is alert and oriented to person, place, and time  Mental status is at baseline  Psychiatric:         Mood and Affect: Mood normal          Behavior: Behavior normal          Thought Content:  Thought content normal          Judgment: Judgment normal

## 2022-07-13 DIAGNOSIS — R05.9 COUGH: ICD-10-CM

## 2022-07-13 RX ORDER — DEXTROMETHORPHAN HYDROBROMIDE AND PROMETHAZINE HYDROCHLORIDE 15; 6.25 MG/5ML; MG/5ML
5 SYRUP ORAL 4 TIMES DAILY PRN
Qty: 120 ML | Refills: 5 | Status: SHIPPED | OUTPATIENT
Start: 2022-07-13 | End: 2022-07-20 | Stop reason: SDUPTHER

## 2022-07-20 DIAGNOSIS — R05.9 COUGH: ICD-10-CM

## 2022-07-20 DIAGNOSIS — E78.2 MIXED HYPERLIPIDEMIA: ICD-10-CM

## 2022-07-20 RX ORDER — DEXTROMETHORPHAN HYDROBROMIDE AND PROMETHAZINE HYDROCHLORIDE 15; 6.25 MG/5ML; MG/5ML
5 SYRUP ORAL 4 TIMES DAILY PRN
Qty: 120 ML | Refills: 0 | Status: SHIPPED | OUTPATIENT
Start: 2022-07-20 | End: 2022-08-03 | Stop reason: SDUPTHER

## 2022-07-20 RX ORDER — ATORVASTATIN CALCIUM 40 MG/1
40 TABLET, FILM COATED ORAL EVERY EVENING
Qty: 90 TABLET | Refills: 0 | Status: SHIPPED | OUTPATIENT
Start: 2022-07-20 | End: 2022-09-28 | Stop reason: SDUPTHER

## 2022-08-03 DIAGNOSIS — R05.9 COUGH: ICD-10-CM

## 2022-08-03 RX ORDER — DEXTROMETHORPHAN HYDROBROMIDE AND PROMETHAZINE HYDROCHLORIDE 15; 6.25 MG/5ML; MG/5ML
5 SYRUP ORAL 4 TIMES DAILY PRN
Qty: 120 ML | Refills: 0 | Status: SHIPPED | OUTPATIENT
Start: 2022-08-03 | End: 2022-08-14 | Stop reason: SDUPTHER

## 2022-08-14 DIAGNOSIS — R05.9 COUGH: ICD-10-CM

## 2022-08-14 DIAGNOSIS — J43.1 PANLOBULAR EMPHYSEMA (HCC): ICD-10-CM

## 2022-08-15 RX ORDER — LEVALBUTEROL TARTRATE 45 UG/1
1-2 AEROSOL, METERED ORAL EVERY 4 HOURS PRN
Qty: 15 G | Refills: 0 | Status: SHIPPED | OUTPATIENT
Start: 2022-08-15 | End: 2022-09-11 | Stop reason: SDUPTHER

## 2022-08-15 RX ORDER — DEXTROMETHORPHAN HYDROBROMIDE AND PROMETHAZINE HYDROCHLORIDE 15; 6.25 MG/5ML; MG/5ML
5 SYRUP ORAL 4 TIMES DAILY PRN
Qty: 120 ML | Refills: 0 | Status: SHIPPED | OUTPATIENT
Start: 2022-08-15 | End: 2022-08-31 | Stop reason: SDUPTHER

## 2022-08-23 ENCOUNTER — RA CDI HCC (OUTPATIENT)
Dept: OTHER | Facility: HOSPITAL | Age: 62
End: 2022-08-23

## 2022-08-23 NOTE — PROGRESS NOTES
[facility]  coding opportunities       Chart reviewed, no opportunity found:   [address]        Patients Insurance     Medicare Insurance: [employer]

## 2022-08-31 DIAGNOSIS — R05.9 COUGH: ICD-10-CM

## 2022-08-31 RX ORDER — DEXTROMETHORPHAN HYDROBROMIDE AND PROMETHAZINE HYDROCHLORIDE 15; 6.25 MG/5ML; MG/5ML
5 SYRUP ORAL 4 TIMES DAILY PRN
Qty: 120 ML | Refills: 0 | Status: SHIPPED | OUTPATIENT
Start: 2022-08-31 | End: 2022-09-11 | Stop reason: SDUPTHER

## 2022-09-07 DIAGNOSIS — F41.9 ANXIETY: ICD-10-CM

## 2022-09-07 RX ORDER — ALPRAZOLAM 1 MG/1
2 TABLET ORAL 3 TIMES DAILY PRN
Qty: 180 TABLET | Refills: 0 | Status: SHIPPED | OUTPATIENT
Start: 2022-09-07 | End: 2022-10-02 | Stop reason: SDUPTHER

## 2022-09-11 DIAGNOSIS — R05.9 COUGH: ICD-10-CM

## 2022-09-11 DIAGNOSIS — J43.1 PANLOBULAR EMPHYSEMA (HCC): ICD-10-CM

## 2022-09-12 RX ORDER — LEVALBUTEROL TARTRATE 45 UG/1
1-2 AEROSOL, METERED ORAL EVERY 4 HOURS PRN
Qty: 15 G | Refills: 0 | Status: SHIPPED | OUTPATIENT
Start: 2022-09-12 | End: 2022-10-02 | Stop reason: SDUPTHER

## 2022-09-12 RX ORDER — DEXTROMETHORPHAN HYDROBROMIDE AND PROMETHAZINE HYDROCHLORIDE 15; 6.25 MG/5ML; MG/5ML
5 SYRUP ORAL 4 TIMES DAILY PRN
Qty: 120 ML | Refills: 0 | Status: SHIPPED | OUTPATIENT
Start: 2022-09-12 | End: 2022-09-20 | Stop reason: SDUPTHER

## 2022-09-19 DIAGNOSIS — R05.9 COUGH: ICD-10-CM

## 2022-09-19 DIAGNOSIS — F51.04 CHRONIC INSOMNIA: ICD-10-CM

## 2022-09-20 DIAGNOSIS — R05.9 COUGH: ICD-10-CM

## 2022-09-20 RX ORDER — TEMAZEPAM 30 MG/1
30 CAPSULE ORAL
Qty: 90 CAPSULE | Refills: 0 | Status: SHIPPED | OUTPATIENT
Start: 2022-09-20 | End: 2022-10-13 | Stop reason: SDUPTHER

## 2022-09-20 RX ORDER — DEXTROMETHORPHAN HYDROBROMIDE AND PROMETHAZINE HYDROCHLORIDE 15; 6.25 MG/5ML; MG/5ML
5 SYRUP ORAL 4 TIMES DAILY PRN
Qty: 120 ML | Refills: 0 | Status: SHIPPED | OUTPATIENT
Start: 2022-09-20

## 2022-09-20 RX ORDER — DEXTROMETHORPHAN HYDROBROMIDE AND PROMETHAZINE HYDROCHLORIDE 15; 6.25 MG/5ML; MG/5ML
5 SYRUP ORAL 4 TIMES DAILY PRN
Qty: 120 ML | Refills: 0 | Status: SHIPPED | OUTPATIENT
Start: 2022-09-20 | End: 2022-09-28 | Stop reason: SDUPTHER

## 2022-09-28 DIAGNOSIS — F41.8 DEPRESSION WITH ANXIETY: ICD-10-CM

## 2022-09-28 DIAGNOSIS — R05.9 COUGH: ICD-10-CM

## 2022-09-28 DIAGNOSIS — E78.2 MIXED HYPERLIPIDEMIA: ICD-10-CM

## 2022-09-28 RX ORDER — SERTRALINE HYDROCHLORIDE 100 MG/1
100 TABLET, FILM COATED ORAL DAILY
Qty: 90 TABLET | Refills: 0 | Status: SHIPPED | OUTPATIENT
Start: 2022-09-28

## 2022-09-28 RX ORDER — ATORVASTATIN CALCIUM 40 MG/1
40 TABLET, FILM COATED ORAL EVERY EVENING
Qty: 90 TABLET | Refills: 0 | Status: SHIPPED | OUTPATIENT
Start: 2022-09-28 | End: 2022-12-27

## 2022-09-28 RX ORDER — DEXTROMETHORPHAN HYDROBROMIDE AND PROMETHAZINE HYDROCHLORIDE 15; 6.25 MG/5ML; MG/5ML
5 SYRUP ORAL 4 TIMES DAILY PRN
Qty: 120 ML | Refills: 0 | Status: SHIPPED | OUTPATIENT
Start: 2022-09-28 | End: 2022-10-02 | Stop reason: SDUPTHER

## 2022-09-29 RX ORDER — SERTRALINE HYDROCHLORIDE 100 MG/1
100 TABLET, FILM COATED ORAL DAILY
Qty: 90 TABLET | Refills: 0 | Status: SHIPPED | OUTPATIENT
Start: 2022-09-29 | End: 2022-10-10 | Stop reason: SDUPTHER

## 2022-10-02 DIAGNOSIS — F41.9 ANXIETY: ICD-10-CM

## 2022-10-02 DIAGNOSIS — R05.9 COUGH: ICD-10-CM

## 2022-10-02 DIAGNOSIS — J43.1 PANLOBULAR EMPHYSEMA (HCC): ICD-10-CM

## 2022-10-03 RX ORDER — ALPRAZOLAM 1 MG/1
2 TABLET ORAL 3 TIMES DAILY PRN
Qty: 180 TABLET | Refills: 0 | Status: SHIPPED | OUTPATIENT
Start: 2022-10-03 | End: 2022-10-28 | Stop reason: SDUPTHER

## 2022-10-03 RX ORDER — LEVALBUTEROL TARTRATE 45 UG/1
1-2 AEROSOL, METERED ORAL EVERY 4 HOURS PRN
Qty: 15 G | Refills: 0 | Status: SHIPPED | OUTPATIENT
Start: 2022-10-03 | End: 2022-10-28 | Stop reason: SDUPTHER

## 2022-10-03 RX ORDER — DEXTROMETHORPHAN HYDROBROMIDE AND PROMETHAZINE HYDROCHLORIDE 15; 6.25 MG/5ML; MG/5ML
5 SYRUP ORAL 4 TIMES DAILY PRN
Qty: 120 ML | Refills: 0 | Status: SHIPPED | OUTPATIENT
Start: 2022-10-03 | End: 2022-10-10 | Stop reason: SDUPTHER

## 2022-10-10 DIAGNOSIS — R05.9 COUGH: ICD-10-CM

## 2022-10-10 DIAGNOSIS — F41.8 DEPRESSION WITH ANXIETY: ICD-10-CM

## 2022-10-10 RX ORDER — SERTRALINE HYDROCHLORIDE 100 MG/1
100 TABLET, FILM COATED ORAL DAILY
Qty: 90 TABLET | Refills: 0 | Status: SHIPPED | OUTPATIENT
Start: 2022-10-10

## 2022-10-10 RX ORDER — DEXTROMETHORPHAN HYDROBROMIDE AND PROMETHAZINE HYDROCHLORIDE 15; 6.25 MG/5ML; MG/5ML
5 SYRUP ORAL 4 TIMES DAILY PRN
Qty: 120 ML | Refills: 0 | Status: SHIPPED | OUTPATIENT
Start: 2022-10-10 | End: 2022-10-13 | Stop reason: SDUPTHER

## 2022-10-13 DIAGNOSIS — R05.9 COUGH: ICD-10-CM

## 2022-10-13 DIAGNOSIS — F51.04 CHRONIC INSOMNIA: ICD-10-CM

## 2022-10-13 RX ORDER — TEMAZEPAM 30 MG/1
30 CAPSULE ORAL
Qty: 90 CAPSULE | Refills: 0 | Status: SHIPPED | OUTPATIENT
Start: 2022-10-13

## 2022-10-13 RX ORDER — DEXTROMETHORPHAN HYDROBROMIDE AND PROMETHAZINE HYDROCHLORIDE 15; 6.25 MG/5ML; MG/5ML
5 SYRUP ORAL 4 TIMES DAILY PRN
Qty: 120 ML | Refills: 0 | Status: SHIPPED | OUTPATIENT
Start: 2022-10-13 | End: 2022-10-21 | Stop reason: SDUPTHER

## 2022-10-21 DIAGNOSIS — R05.9 COUGH: ICD-10-CM

## 2022-10-21 RX ORDER — DEXTROMETHORPHAN HYDROBROMIDE AND PROMETHAZINE HYDROCHLORIDE 15; 6.25 MG/5ML; MG/5ML
5 SYRUP ORAL 4 TIMES DAILY PRN
Qty: 120 ML | Refills: 0 | Status: SHIPPED | OUTPATIENT
Start: 2022-10-21 | End: 2022-10-28 | Stop reason: SDUPTHER

## 2022-10-26 ENCOUNTER — TELEPHONE (OUTPATIENT)
Dept: FAMILY MEDICINE CLINIC | Facility: CLINIC | Age: 62
End: 2022-10-26

## 2022-10-28 DIAGNOSIS — R05.9 COUGH: ICD-10-CM

## 2022-10-28 DIAGNOSIS — J43.1 PANLOBULAR EMPHYSEMA (HCC): ICD-10-CM

## 2022-10-28 DIAGNOSIS — F41.9 ANXIETY: ICD-10-CM

## 2022-10-28 RX ORDER — DEXTROMETHORPHAN HYDROBROMIDE AND PROMETHAZINE HYDROCHLORIDE 15; 6.25 MG/5ML; MG/5ML
5 SYRUP ORAL 4 TIMES DAILY PRN
Qty: 120 ML | Refills: 0 | Status: SHIPPED | OUTPATIENT
Start: 2022-10-28

## 2022-10-28 RX ORDER — LEVALBUTEROL TARTRATE 45 UG/1
1-2 AEROSOL, METERED ORAL EVERY 4 HOURS PRN
Qty: 15 G | Refills: 0 | Status: SHIPPED | OUTPATIENT
Start: 2022-10-28

## 2022-10-28 RX ORDER — ALPRAZOLAM 1 MG/1
2 TABLET ORAL 3 TIMES DAILY PRN
Qty: 180 TABLET | Refills: 0 | Status: SHIPPED | OUTPATIENT
Start: 2022-10-28

## 2022-11-07 DIAGNOSIS — R05.9 COUGH: ICD-10-CM

## 2022-11-07 RX ORDER — DEXTROMETHORPHAN HYDROBROMIDE AND PROMETHAZINE HYDROCHLORIDE 15; 6.25 MG/5ML; MG/5ML
5 SYRUP ORAL 4 TIMES DAILY PRN
Qty: 120 ML | Refills: 0 | Status: SHIPPED | OUTPATIENT
Start: 2022-11-07 | End: 2022-11-14 | Stop reason: SDUPTHER

## 2022-11-08 ENCOUNTER — RA CDI HCC (OUTPATIENT)
Dept: OTHER | Facility: HOSPITAL | Age: 62
End: 2022-11-08

## 2022-11-14 DIAGNOSIS — E78.2 MIXED HYPERLIPIDEMIA: ICD-10-CM

## 2022-11-14 DIAGNOSIS — F41.8 DEPRESSION WITH ANXIETY: ICD-10-CM

## 2022-11-14 DIAGNOSIS — J43.1 PANLOBULAR EMPHYSEMA (HCC): ICD-10-CM

## 2022-11-14 DIAGNOSIS — F51.04 CHRONIC INSOMNIA: ICD-10-CM

## 2022-11-14 DIAGNOSIS — R05.9 COUGH: ICD-10-CM

## 2022-11-14 RX ORDER — ATORVASTATIN CALCIUM 40 MG/1
40 TABLET, FILM COATED ORAL EVERY EVENING
Qty: 90 TABLET | Refills: 0 | Status: SHIPPED | OUTPATIENT
Start: 2022-11-14 | End: 2023-02-12

## 2022-11-14 RX ORDER — DEXTROMETHORPHAN HYDROBROMIDE AND PROMETHAZINE HYDROCHLORIDE 15; 6.25 MG/5ML; MG/5ML
5 SYRUP ORAL 4 TIMES DAILY PRN
Qty: 120 ML | Refills: 0 | Status: SHIPPED | OUTPATIENT
Start: 2022-11-14 | End: 2022-11-19 | Stop reason: SDUPTHER

## 2022-11-14 RX ORDER — SERTRALINE HYDROCHLORIDE 100 MG/1
100 TABLET, FILM COATED ORAL DAILY
Qty: 90 TABLET | Refills: 0 | Status: SHIPPED | OUTPATIENT
Start: 2022-11-14 | End: 2022-11-19 | Stop reason: SDUPTHER

## 2022-11-14 RX ORDER — LEVALBUTEROL TARTRATE 45 UG/1
1-2 AEROSOL, METERED ORAL EVERY 4 HOURS PRN
Qty: 15 G | Refills: 0 | Status: SHIPPED | OUTPATIENT
Start: 2022-11-14 | End: 2022-11-19 | Stop reason: SDUPTHER

## 2022-11-14 RX ORDER — SERTRALINE HYDROCHLORIDE 100 MG/1
100 TABLET, FILM COATED ORAL DAILY
Qty: 90 TABLET | Refills: 0 | Status: SHIPPED | OUTPATIENT
Start: 2022-11-14

## 2022-11-14 RX ORDER — TEMAZEPAM 30 MG/1
30 CAPSULE ORAL
Qty: 90 CAPSULE | Refills: 0 | Status: SHIPPED | OUTPATIENT
Start: 2022-11-14

## 2022-11-14 RX ORDER — ATORVASTATIN CALCIUM 40 MG/1
40 TABLET, FILM COATED ORAL EVERY EVENING
Qty: 90 TABLET | Refills: 0 | Status: SHIPPED | OUTPATIENT
Start: 2022-11-14 | End: 2022-11-19 | Stop reason: SDUPTHER

## 2022-11-21 DIAGNOSIS — R05.9 COUGH: ICD-10-CM

## 2022-11-22 RX ORDER — DEXTROMETHORPHAN HYDROBROMIDE AND PROMETHAZINE HYDROCHLORIDE 15; 6.25 MG/5ML; MG/5ML
5 SYRUP ORAL 4 TIMES DAILY PRN
Qty: 120 ML | Refills: 0 | Status: SHIPPED | OUTPATIENT
Start: 2022-11-22 | End: 2022-11-28 | Stop reason: SDUPTHER

## 2022-11-27 DIAGNOSIS — F41.9 ANXIETY: ICD-10-CM

## 2022-11-28 DIAGNOSIS — R05.9 COUGH: ICD-10-CM

## 2022-11-28 RX ORDER — ALPRAZOLAM 1 MG/1
2 TABLET ORAL 3 TIMES DAILY PRN
Qty: 180 TABLET | Refills: 0 | Status: SHIPPED | OUTPATIENT
Start: 2022-11-28

## 2022-11-29 RX ORDER — DEXTROMETHORPHAN HYDROBROMIDE AND PROMETHAZINE HYDROCHLORIDE 15; 6.25 MG/5ML; MG/5ML
5 SYRUP ORAL 4 TIMES DAILY PRN
Qty: 120 ML | Refills: 0 | Status: SHIPPED | OUTPATIENT
Start: 2022-11-29 | End: 2022-12-09

## 2022-12-05 DIAGNOSIS — J43.1 PANLOBULAR EMPHYSEMA (HCC): ICD-10-CM

## 2022-12-05 DIAGNOSIS — R05.9 COUGH: ICD-10-CM

## 2022-12-05 RX ORDER — DEXTROMETHORPHAN HYDROBROMIDE AND PROMETHAZINE HYDROCHLORIDE 15; 6.25 MG/5ML; MG/5ML
5 SYRUP ORAL 4 TIMES DAILY PRN
Qty: 120 ML | Refills: 0 | Status: SHIPPED | OUTPATIENT
Start: 2022-12-05 | End: 2022-12-14 | Stop reason: SDUPTHER

## 2022-12-05 RX ORDER — LEVALBUTEROL TARTRATE 45 UG/1
1-2 AEROSOL, METERED ORAL EVERY 4 HOURS PRN
Qty: 15 G | Refills: 0 | Status: SHIPPED | OUTPATIENT
Start: 2022-12-05

## 2022-12-09 ENCOUNTER — OFFICE VISIT (OUTPATIENT)
Dept: FAMILY MEDICINE CLINIC | Facility: CLINIC | Age: 62
End: 2022-12-09

## 2022-12-09 VITALS
WEIGHT: 139 LBS | TEMPERATURE: 97.2 F | HEART RATE: 79 BPM | RESPIRATION RATE: 18 BRPM | DIASTOLIC BLOOD PRESSURE: 70 MMHG | HEIGHT: 66 IN | SYSTOLIC BLOOD PRESSURE: 124 MMHG | OXYGEN SATURATION: 97 % | BODY MASS INDEX: 22.34 KG/M2

## 2022-12-09 DIAGNOSIS — F17.200 SMOKER: ICD-10-CM

## 2022-12-09 DIAGNOSIS — Z12.11 SCREEN FOR COLON CANCER: ICD-10-CM

## 2022-12-09 DIAGNOSIS — R11.0 NAUSEA: ICD-10-CM

## 2022-12-09 DIAGNOSIS — F51.04 CHRONIC INSOMNIA: ICD-10-CM

## 2022-12-09 DIAGNOSIS — Z12.31 BREAST CANCER SCREENING BY MAMMOGRAM: Primary | ICD-10-CM

## 2022-12-09 RX ORDER — NICOTINE 21 MG/24HR
1 PATCH, TRANSDERMAL 24 HOURS TRANSDERMAL EVERY 24 HOURS
Qty: 28 PATCH | Refills: 0 | Status: SHIPPED | OUTPATIENT
Start: 2022-12-09

## 2022-12-09 RX ORDER — ONDANSETRON 4 MG/1
4 TABLET, FILM COATED ORAL EVERY 8 HOURS PRN
Qty: 20 TABLET | Refills: 0 | Status: SHIPPED | OUTPATIENT
Start: 2022-12-09

## 2022-12-09 RX ORDER — TEMAZEPAM 30 MG/1
30 CAPSULE ORAL
Qty: 90 CAPSULE | Refills: 5 | Status: SHIPPED | OUTPATIENT
Start: 2022-12-09

## 2022-12-09 RX ORDER — BUPROPION HYDROCHLORIDE 150 MG/1
150 TABLET ORAL EVERY MORNING
Qty: 30 TABLET | Refills: 5 | Status: SHIPPED | OUTPATIENT
Start: 2022-12-09

## 2022-12-09 NOTE — PATIENT INSTRUCTIONS
Medicare Preventive Visit Patient Instructions  Thank you for completing your Welcome to Medicare Visit or Medicare Annual Wellness Visit today  Your next wellness visit will be due in one year (12/10/2023)  The screening/preventive services that you may require over the next 5-10 years are detailed below  Some tests may not apply to you based off risk factors and/or age  Screening tests ordered at today's visit but not completed yet may show as past due  Also, please note that scanned in results may not display below  Preventive Screenings:  Service Recommendations Previous Testing/Comments   Colorectal Cancer Screening  * Colonoscopy    * Fecal Occult Blood Test (FOBT)/Fecal Immunochemical Test (FIT)  * Fecal DNA/Cologuard Test  * Flexible Sigmoidoscopy Age: 39-70 years old   Colonoscopy: every 10 years (may be performed more frequently if at higher risk)  OR  FOBT/FIT: every 1 year  OR  Cologuard: every 3 years  OR  Sigmoidoscopy: every 5 years  Screening may be recommended earlier than age 39 if at higher risk for colorectal cancer  Also, an individualized decision between you and your healthcare provider will decide whether screening between the ages of 74-80 would be appropriate  Colonoscopy: 01/01/2005  FOBT/FIT: Not on file  Cologuard: Not on file  Sigmoidoscopy: Not on file          Breast Cancer Screening Age: 36 years old  Frequency: every 1-2 years  Not required if history of left and right mastectomy Mammogram: Not on file        Cervical Cancer Screening Between the ages of 21-29, pap smear recommended once every 3 years  Between the ages of 33-67, can perform pap smear with HPV co-testing every 5 years     Recommendations may differ for women with a history of total hysterectomy, cervical cancer, or abnormal pap smears in past  Pap Smear: Not on file        Hepatitis C Screening Once for adults born between [facility]  More frequently in patients at high risk for Hepatitis C Hep C Antibody: 01/04/2018    Screening Current   Diabetes Screening 1-2 times per year if you're at risk for diabetes or have pre-diabetes Fasting glucose: No results in last 5 years (No results in last 5 years)  A1C: 5 6 % (2/17/2019)      Cholesterol Screening Once every 5 years if you don't have a lipid disorder  May order more often based on risk factors  Lipid panel: 11/26/2021    Screening Not Indicated  History Lipid Disorder     Other Preventive Screenings Covered by Medicare:  1  Abdominal Aortic Aneurysm (AAA) Screening: covered once if your at risk  You're considered to be at risk if you have a family history of AAA  2  Lung Cancer Screening: covers low dose CT scan once per year if you meet all of the following conditions: (1) Age 50-69; (2) No signs or symptoms of lung cancer; (3) Current smoker or have quit smoking within the last 15 years; (4) You have a tobacco smoking history of at least 20 pack years (packs per day multiplied by number of years you smoked); (5) You get a written order from a healthcare provider  3  Glaucoma Screening: covered annually if you're considered high risk: (1) You have diabetes OR (2) Family history of glaucoma OR (3)  aged 48 and older OR (3)  American aged 72 and older  3  Osteoporosis Screening: covered every 2 years if you meet one of the following conditions: (1) You're estrogen deficient and at risk for osteoporosis based off medical history and other findings; (2) Have a vertebral abnormality; (3) On glucocorticoid therapy for more than 3 months; (4) Have primary hyperparathyroidism; (5) On osteoporosis medications and need to assess response to drug therapy  · Last bone density test (DXA Scan): Not on file  5  HIV Screening: covered annually if you're between the age of 12-76  Also covered annually if you are younger than 13 and older than 72 with risk factors for HIV infection   For pregnant patients, it is covered up to 3 times per pregnancy  Immunizations:  Immunization Recommendations   Influenza Vaccine Annual influenza vaccination during flu season is recommended for all persons aged >= 6 months who do not have contraindications   Pneumococcal Vaccine   * Pneumococcal conjugate vaccine = PCV13 (Prevnar 13), PCV15 (Vaxneuvance), PCV20 (Prevnar 20)  * Pneumococcal polysaccharide vaccine = PPSV23 (Pneumovax) Adults 25-60 years old: 1-3 doses may be recommended based on certain risk factors  Adults 72 years old: 1-2 doses may be recommended based off what pneumonia vaccine you previously received   Hepatitis B Vaccine 3 dose series if at intermediate or high risk (ex: diabetes, end stage renal disease, liver disease)   Tetanus (Td) Vaccine - COST NOT COVERED BY MEDICARE PART B Following completion of primary series, a booster dose should be given every 10 years to maintain immunity against tetanus  Td may also be given as tetanus wound prophylaxis  Tdap Vaccine - COST NOT COVERED BY MEDICARE PART B Recommended at least once for all adults  For pregnant patients, recommended with each pregnancy  Shingles Vaccine (Shingrix) - COST NOT COVERED BY MEDICARE PART B  2 shot series recommended in those aged 48 and above     Health Maintenance Due:      Topic Date Due   • HIV Screening  Never done   • Cervical Cancer Screening  Never done   • Breast Cancer Screening: Mammogram  Never done   • Colorectal Cancer Screening  Never done   • Hepatitis C Screening  Completed     Immunizations Due:      Topic Date Due   • Hepatitis B Vaccine (1 of 3 - 3-dose series) Never done   • Influenza Vaccine (1) Never done     Advance Directives   What are advance directives? Advance directives are legal documents that state your wishes and plans for medical care  These plans are made ahead of time in case you lose your ability to make decisions for yourself   Advance directives can apply to any medical decision, such as the treatments you want, and if you want to donate organs  What are the types of advance directives? There are many types of advance directives, and each state has rules about how to use them  You may choose a combination of any of the following:  · Living will: This is a written record of the treatment you want  You can also choose which treatments you do not want, which to limit, and which to stop at a certain time  This includes surgery, medicine, IV fluid, and tube feedings  · Durable power of  for healthcare [facility]): This is a written record that states who you want to make healthcare choices for you when you are unable to make them for yourself  This person, called a proxy, is usually a family member or a friend  You may choose more than 1 proxy  · Do not resuscitate (DNR) order:  A DNR order is used in case your heart stops beating or you stop breathing  It is a request not to have certain forms of treatment, such as CPR  A DNR order may be included in other types of advance directives  · Medical directive: This covers the care that you want if you are in a coma, near death, or unable to make decisions for yourself  You can list the treatments you want for each condition  Treatment may include pain medicine, surgery, blood transfusions, dialysis, IV or tube feedings, and a ventilator (breathing machine)  · Values history: This document has questions about your views, beliefs, and how you feel and think about life  This information can help others choose the care that you would choose  Why are advance directives important? An advance directive helps you control your care  Although spoken wishes may be used, it is better to have your wishes written down  Spoken wishes can be misunderstood, or not followed  Treatments may be given even if you do not want them  An advance directive may make it easier for your family to make difficult choices about your care     Cigarette Smoking and Your Health   Risks to your health if you smoke: Nicotine and other chemicals found in tobacco damage every cell in your body  Even if you are a light smoker, you have an increased risk for cancer, heart disease, and lung disease  If you are pregnant or have diabetes, smoking increases your risk for complications  Benefits to your health if you stop smoking:   · You decrease respiratory symptoms such as coughing, wheezing, and shortness of breath  · You reduce your risk for cancers of the lung, mouth, throat, kidney, bladder, pancreas, stomach, and cervix  If you already have cancer, you increase the benefits of chemotherapy  You also reduce your risk for cancer returning or a second cancer from developing  · You reduce your risk for heart disease, blood clots, heart attack, and stroke  · You reduce your risk for lung infections, and diseases such as pneumonia, asthma, chronic bronchitis, and emphysema  · Your circulation improves  More oxygen can be delivered to your body  If you have diabetes, you lower your risk for complications, such as kidney, artery, and eye diseases  You also lower your risk for nerve damage  Nerve damage can lead to amputations, poor vision, and blindness  · You improve your body's ability to heal and to fight infections  For more information and support to stop smoking:   · Smokefree  gov  Phone: 5- 243 - 945-5177  Web Address: www [employer]  [address] [place], [place] [address]saurabh 2018 Information is for End User's use only and may not be sold, redistributed or otherwise used for commercial purposes   All illustrations and images included in CareNotes® are the copyrighted property of [employer]  or [address] [place] [address]

## 2022-12-09 NOTE — PROGRESS NOTES
Assessment and Plan:     Problem List Items Addressed This Visit        Other    Chronic insomnia    Relevant Medications    temazepam (RESTORIL) 30 mg capsule   Other Visit Diagnoses     Breast cancer screening by mammogram    -  Primary    Relevant Orders    Mammo screening bilateral w cad    Screen for colon cancer        Relevant Orders    Cologuard    Smoker        Relevant Medications    buPROPion (Wellbutrin XL) 150 mg 24 hr tablet    nicotine (NICODERM CQ) 21 mg/24 hr TD 24 hr patch    Nausea        Relevant Medications    ondansetron (ZOFRAN) 4 mg tablet           Preventive health issues were discussed with patient, and age appropriate screening tests were ordered as noted in patient's After Visit Summary  Personalized health advice and appropriate referrals for health education or preventive services given if needed, as noted in patient's After Visit Summary       History of Present Illness:     Patient presents for a Medicare Wellness Visit    HPI   Patient Care Team:  Eleazar Meraz MD as PCP - General (Family Medicine)  Eleazar Meraz MD as PCP - [address] [place] [address] (RTE)  [facility]  Tai Quinonez MD (Oncology)  Lisbeth Johnson MD (Radiation Oncology)  [facility] (Ophthalmology)     Review of Systems:     Review of Systems     Problem List:     Patient Active Problem List   Diagnosis   • Adenosquamous carcinoma of right lung [facility])   • Anemia   • Asthma   • Chronic pancreatitis ([facility] )   • COPD with emphysema ([facility] )   • Degeneration of lumbosacral intervertebral disc   • Dyslipidemia   • Endobronchial mass   • Fibromyalgia   • Gastroesophageal reflux disease   • Non-small cell cancer of right lung ([facility] )   • Restless legs syndrome   • Anxiety   • Protein calorie malnutrition ([address] )   • Chronic cough   • Chronic insomnia   • Severe depression ([address] )   • PTSD (post-traumatic stress disorder)      Past Medical and Surgical History:     Past Medical History: Diagnosis Date   • [facility] with [facility] ) 7/31/2020   • Cancer (HCC)    • Diabetes mellitus ([facility] )     borderline   • Irregular heartbeat    • Pancreatitis     Pre cancerous lesions per Omkar Cristobal     Past Surgical History:   Procedure Laterality Date   • BRONCHOSCOPY     • CHOLECYSTECTOMY        Family History:     Family History   Problem Relation Age of Onset   • Cancer Mother    • COPD Mother    • Diabetes Maternal Grandfather       Social History:     Social History     Socioeconomic History   • Marital status: /Civil Union     Spouse name: None   • Number of children: None   • Years of education: None   • Highest education level: None   Occupational History   • None   Tobacco Use   • Smoking status: Every Day     Packs/day: 0 50     Years: 40 00     Pack years: 20 00     Types: Cigarettes     Last attempt to quit: 1/11/2019     Years since quitting: 3 9   • Smokeless tobacco: Never   Vaping Use   • Vaping Use: Never used   Substance and Sexual Activity   • Alcohol use: No   • Drug use: No   • Sexual activity: None   Other Topics Concern   • None   Social History Narrative   • None     Social Determinants of Health     Financial Resource Strain: Not on file   Food Insecurity: Not on file   Transportation Needs: Not on file   Physical Activity: Not on file   Stress: Not on file   Social Connections: Not on file   Intimate Partner Violence: Not on file   Housing Stability: Not on file      Medications and Allergies:     Current Outpatient Medications   Medication Sig Dispense Refill   • ALPRAZolam (XANAX) 1 mg tablet Take 2 tablets (2 mg total) by mouth 3 (three) times a day as needed for anxiety 180 tablet 0   • atorvastatin (LIPITOR) 40 mg tablet Take 1 tablet (40 mg total) by mouth every evening 90 tablet 1   • buPROPion (Wellbutrin XL) 150 mg 24 hr tablet Take 1 tablet (150 mg total) by mouth every morning 30 tablet 5   • folic acid (FOLVITE) 1 mg tablet Take by mouth daily • levalbuterol (XOPENEX HFA) 45 mcg/act inhaler Inhale 1-2 puffs every 4 (four) hours as needed for wheezing or shortness of breath 15 g 0   • nicotine (NICODERM CQ) 21 mg/24 hr TD 24 hr patch Place 1 patch on the skin every 24 hours 28 patch 0   • ondansetron (ZOFRAN) 4 mg tablet Take 1 tablet (4 mg total) by mouth every 8 (eight) hours as needed for nausea or vomiting 20 tablet 0   • promethazine-dextromethorphan (PHENERGAN-DM) 6 25-15 mg/5 mL oral syrup Take 5 mL by mouth 4 (four) times a day as needed for cough 120 mL 0   • sertraline (ZOLOFT) 100 mg tablet Take 1 tablet (100 mg total) by mouth daily 90 tablet 1   • temazepam (RESTORIL) 30 mg capsule Take 1 capsule (30 mg total) by mouth daily at bedtime 90 capsule 5   • tiotropium (Spiriva HandiHaler) 18 mcg inhalation capsule Place 1 capsule (18 mcg total) into inhaler and inhale daily 90 capsule 1   • cholecalciferol (VITAMIN D3) 400 units tablet Take 400 Units by mouth daily       No current facility-administered medications for this visit       Allergies   Allergen Reactions   • Contrast [Iodinated Diagnostic Agents] Hives, Throat Swelling and Facial Swelling   • Sulfa Antibiotics Rash and Palpitations   • [place] Flavor - Food Allergy Hives   • Antihistamine & Nasal Deconges [Fexofenadine-Pseudoephed Er] Other (See Comments)     unknown   • Codeine Headache     Severe migraines per pt   • Lisinopril Cough   • Mangifera Indica Other (See Comments)     unknown   • Nsaids Other (See Comments)     daypro, orudis    can take motrin   • Other Other (See Comments)     Steroid shots:  Heart race, shakes   • Poison Ivy Extract Other (See Comments)     Steroid shots:  Heart race, shakes  Mangoes: rash   • Naproxen Rash   • Prednisone Palpitations      Immunizations:     Immunization History   Administered Date(s) Administered   • COVID-19 MODERNA VACC 0 5 ML IM 02/17/2021, 03/17/2021, 09/02/2021, 02/15/2022   • Pneumococcal Conjugate 13-Valent 03/25/2022   • Pneumococcal Polysaccharide PPV23 01/20/2021   • Tdap 08/02/2007      Health Maintenance:         Topic Date Due   • HIV Screening  Never done   • Cervical Cancer Screening  Never done   • Breast Cancer Screening: Mammogram  Never done   • Colorectal Cancer Screening  Never done   • Hepatitis C Screening  Completed         Topic Date Due   • Hepatitis B Vaccine (1 of 3 - 3-dose series) Never done   • Influenza Vaccine (1) Never done      Medicare Screening Tests and Risk Assessments:         Health Risk Assessment:   Patient rates overall health as poor  Patient feels that their physical health rating is slightly worse  Patient is dissatisfied with their life  Eyesight was rated as slightly worse  Hearing was rated as slightly worse  Patient feels that their emotional and mental health rating is much worse  Patients states they are never, rarely angry  Pain experienced in the last 7 days has been a lot  Patient's pain rating has been 7/10  Patient states that she has experienced no weight loss or gain in last 6 months  Fall Risk Screening: In the past year, patient has experienced: no history of falling in past year      Urinary Incontinence Screening:   Patient has not leaked urine accidently in the last six months  Home Safety:  Patient has trouble with stairs inside or outside of their home  Patient has working smoke alarms and has working carbon monoxide detector  Home safety hazards include: none  Nutrition:   Current diet is Regular  Medications:   Patient is currently taking over-the-counter supplements  OTC medications include: see medication list  Patient is able to manage medications  Activities of Daily Living (ADLs)/Instrumental Activities of Daily Living (IADLs):   Walk and transfer into and out of bed and chair?: Yes  Dress and groom yourself?: Yes    Bathe or shower yourself?: Yes    Feed yourself?  Yes  Do your laundry/housekeeping?: Yes  Manage your money, pay your bills and track your expenses?: Yes  Make your own meals?: Yes    Do your own shopping?: Yes    Previous Hospitalizations:   Any hospitalizations or ED visits within the last 12 months?: No      PREVENTIVE SCREENINGS      Cardiovascular Screening:    General: Screening Not Indicated and History Lipid Disorder      Lung Cancer Screening:     General: Screening Not Indicated and History Lung Cancer      Hepatitis C Screening:    General: Screening Current    Screening, Brief Intervention, and Referral to Treatment (SBIRT)    Screening  Typical number of drinks in a day: 0  Typical number of drinks in a week: 0  Interpretation: Low risk drinking behavior  Single Item Drug Screening:  How often have you used an illegal drug (including marijuana) or a prescription medication for non-medical reasons in the past year? never    Single Item Drug Screen Score: 0  Interpretation: Negative screen for possible drug use disorder    No results found       Physical Exam:     /70 (BP Location: Right arm, Patient Position: Sitting, Cuff Size: Standard)   Pulse 79   Temp (!) 97 2 °F (36 2 °C) (Temporal)   Resp 18   Ht 5' 6" (1 676 m)   Wt 63 kg (139 lb)   SpO2 97%   BMI 22 44 kg/m²     Physical Exam     Cheko Gray MD

## 2022-12-09 NOTE — PROGRESS NOTES
Name: Christina Rehman      : 1960      MRN: [id number]  Encounter Provider: Tim Cook MD  Encounter Date: 2022   Encounter department: [address] [place] [address]     1  Breast cancer screening by mammogram  -     Mammo screening bilateral w cad; Future; Expected date: 2022    2  Screen for colon cancer  -     Cologuard    3  Smoker  -     buPROPion (Wellbutrin XL) 150 mg 24 hr tablet; Take 1 tablet (150 mg total) by mouth every morning  -     nicotine (NICODERM CQ) 21 mg/24 hr TD 24 hr patch; Place 1 patch on the skin every 24 hours    4  Chronic insomnia  -     temazepam (RESTORIL) 30 mg capsule; Take 1 capsule (30 mg total) by mouth daily at bedtime    5  Nausea  -     ondansetron (ZOFRAN) 4 mg tablet; Take 1 tablet (4 mg total) by mouth every 8 (eight) hours as needed for nausea or vomiting           Subjective      She's tearful in the office today  She had to put her dog down  Besides missing him, she feels unsafe without him  Review of Systems   All other systems reviewed and are negative        Current Outpatient Medications on File Prior to Visit   Medication Sig   • ALPRAZolam (XANAX) 1 mg tablet Take 2 tablets (2 mg total) by mouth 3 (three) times a day as needed for anxiety   • atorvastatin (LIPITOR) 40 mg tablet Take 1 tablet (40 mg total) by mouth every evening   • folic acid (FOLVITE) 1 mg tablet Take by mouth daily   • levalbuterol (XOPENEX HFA) 45 mcg/act inhaler Inhale 1-2 puffs every 4 (four) hours as needed for wheezing or shortness of breath   • promethazine-dextromethorphan (PHENERGAN-DM) 6 25-15 mg/5 mL oral syrup Take 5 mL by mouth 4 (four) times a day as needed for cough   • sertraline (ZOLOFT) 100 mg tablet Take 1 tablet (100 mg total) by mouth daily   • tiotropium (Spiriva HandiHaler) 18 mcg inhalation capsule Place 1 capsule (18 mcg total) into inhaler and inhale daily   • cholecalciferol (VITAMIN D3) 400 units tablet Take 400 Units by mouth daily       Objective     /70 (BP Location: Right arm, Patient Position: Sitting, Cuff Size: Standard)   Pulse 79   Temp (!) 97 2 °F (36 2 °C) (Temporal)   Resp 18   Ht 5' 6" (1 676 m)   Wt 63 kg (139 lb)   SpO2 97%   BMI 22 44 kg/m²     Physical Exam  Vitals and nursing note reviewed  Constitutional:       Appearance: Normal appearance  She is normal weight  Cardiovascular:      Rate and Rhythm: Normal rate and regular rhythm  Pulses: Normal pulses  Heart sounds: Normal heart sounds  Pulmonary:      Effort: Pulmonary effort is normal       Breath sounds: Normal breath sounds  Abdominal:      General: Abdomen is flat  Bowel sounds are normal       Palpations: Abdomen is soft  Musculoskeletal:         General: Normal range of motion  Cervical back: Normal range of motion and neck supple  Skin:     General: Skin is warm and dry  Neurological:      General: No focal deficit present  Mental Status: She is alert and oriented to person, place, and time  Mental status is at baseline  Psychiatric:         Mood and Affect: Mood normal          Behavior: Behavior normal          Thought Content:  Thought content normal          Judgment: Judgment normal        Ronda Phoenix, MD

## 2022-12-14 DIAGNOSIS — R05.9 COUGH: ICD-10-CM

## 2022-12-14 RX ORDER — DEXTROMETHORPHAN HYDROBROMIDE AND PROMETHAZINE HYDROCHLORIDE 15; 6.25 MG/5ML; MG/5ML
5 SYRUP ORAL 4 TIMES DAILY PRN
Qty: 120 ML | Refills: 0 | Status: SHIPPED | OUTPATIENT
Start: 2022-12-14 | End: 2022-12-19 | Stop reason: SDUPTHER

## 2022-12-19 DIAGNOSIS — R05.9 COUGH: ICD-10-CM

## 2022-12-19 RX ORDER — DEXTROMETHORPHAN HYDROBROMIDE AND PROMETHAZINE HYDROCHLORIDE 15; 6.25 MG/5ML; MG/5ML
5 SYRUP ORAL 4 TIMES DAILY PRN
Qty: 120 ML | Refills: 0 | Status: SHIPPED | OUTPATIENT
Start: 2022-12-19 | End: 2022-12-26 | Stop reason: SDUPTHER

## 2022-12-23 DIAGNOSIS — F41.9 ANXIETY: ICD-10-CM

## 2022-12-26 DIAGNOSIS — J43.1 PANLOBULAR EMPHYSEMA (HCC): ICD-10-CM

## 2022-12-26 DIAGNOSIS — R05.9 COUGH: ICD-10-CM

## 2022-12-27 RX ORDER — ALPRAZOLAM 1 MG/1
2 TABLET ORAL 3 TIMES DAILY PRN
Qty: 180 TABLET | Refills: 0 | Status: SHIPPED | OUTPATIENT
Start: 2022-12-27

## 2022-12-27 RX ORDER — DEXTROMETHORPHAN HYDROBROMIDE AND PROMETHAZINE HYDROCHLORIDE 15; 6.25 MG/5ML; MG/5ML
5 SYRUP ORAL 4 TIMES DAILY PRN
Qty: 120 ML | Refills: 0 | Status: SHIPPED | OUTPATIENT
Start: 2022-12-27 | End: 2023-01-04 | Stop reason: SDUPTHER

## 2022-12-27 RX ORDER — LEVALBUTEROL TARTRATE 45 UG/1
1-2 AEROSOL, METERED ORAL EVERY 4 HOURS PRN
Qty: 15 G | Refills: 0 | Status: SHIPPED | OUTPATIENT
Start: 2022-12-27

## 2023-01-04 DIAGNOSIS — R05.9 COUGH: ICD-10-CM

## 2023-01-05 RX ORDER — DEXTROMETHORPHAN HYDROBROMIDE AND PROMETHAZINE HYDROCHLORIDE 15; 6.25 MG/5ML; MG/5ML
5 SYRUP ORAL 4 TIMES DAILY PRN
Qty: 120 ML | Refills: 0 | Status: SHIPPED | OUTPATIENT
Start: 2023-01-05 | End: 2023-01-09 | Stop reason: SDUPTHER

## 2023-01-09 DIAGNOSIS — R05.9 COUGH: ICD-10-CM

## 2023-01-09 RX ORDER — DEXTROMETHORPHAN HYDROBROMIDE AND PROMETHAZINE HYDROCHLORIDE 15; 6.25 MG/5ML; MG/5ML
5 SYRUP ORAL 4 TIMES DAILY PRN
Qty: 120 ML | Refills: 0 | Status: SHIPPED | OUTPATIENT
Start: 2023-01-09 | End: 2023-01-17 | Stop reason: SDUPTHER

## 2023-01-10 DIAGNOSIS — F51.04 CHRONIC INSOMNIA: ICD-10-CM

## 2023-01-10 DIAGNOSIS — F41.9 ANXIETY: ICD-10-CM

## 2023-01-10 RX ORDER — ALPRAZOLAM 1 MG/1
2 TABLET ORAL 3 TIMES DAILY PRN
Qty: 180 TABLET | Refills: 0 | Status: CANCELLED | OUTPATIENT
Start: 2023-01-10

## 2023-01-10 RX ORDER — TEMAZEPAM 30 MG/1
30 CAPSULE ORAL
Qty: 90 CAPSULE | Refills: 0 | Status: SHIPPED | OUTPATIENT
Start: 2023-01-10

## 2023-01-17 DIAGNOSIS — R05.9 COUGH: ICD-10-CM

## 2023-01-17 DIAGNOSIS — J43.1 PANLOBULAR EMPHYSEMA (HCC): ICD-10-CM

## 2023-01-17 RX ORDER — DEXTROMETHORPHAN HYDROBROMIDE AND PROMETHAZINE HYDROCHLORIDE 15; 6.25 MG/5ML; MG/5ML
5 SYRUP ORAL 4 TIMES DAILY PRN
Qty: 120 ML | Refills: 0 | Status: SHIPPED | OUTPATIENT
Start: 2023-01-17 | End: 2023-01-23 | Stop reason: SDUPTHER

## 2023-01-17 RX ORDER — LEVALBUTEROL TARTRATE 45 UG/1
1-2 AEROSOL, METERED ORAL EVERY 4 HOURS PRN
Qty: 15 G | Refills: 0 | Status: SHIPPED | OUTPATIENT
Start: 2023-01-17 | End: 2023-01-23

## 2023-01-23 DIAGNOSIS — R05.9 COUGH: ICD-10-CM

## 2023-01-23 DIAGNOSIS — F41.9 ANXIETY: ICD-10-CM

## 2023-01-23 DIAGNOSIS — J43.1 PANLOBULAR EMPHYSEMA (HCC): ICD-10-CM

## 2023-01-23 RX ORDER — DEXTROMETHORPHAN HYDROBROMIDE AND PROMETHAZINE HYDROCHLORIDE 15; 6.25 MG/5ML; MG/5ML
5 SYRUP ORAL 4 TIMES DAILY PRN
Qty: 120 ML | Refills: 0 | Status: SHIPPED | OUTPATIENT
Start: 2023-01-23 | End: 2023-01-30 | Stop reason: SDUPTHER

## 2023-01-23 RX ORDER — ALPRAZOLAM 1 MG/1
2 TABLET ORAL 3 TIMES DAILY PRN
Qty: 180 TABLET | Refills: 0 | Status: SHIPPED | OUTPATIENT
Start: 2023-01-23

## 2023-01-23 RX ORDER — LEVALBUTEROL TARTRATE 45 UG/1
1-2 AEROSOL, METERED ORAL EVERY 4 HOURS PRN
Qty: 15 G | Refills: 0 | Status: SHIPPED | OUTPATIENT
Start: 2023-01-23

## 2023-01-30 DIAGNOSIS — R05.9 COUGH: ICD-10-CM

## 2023-01-30 RX ORDER — DEXTROMETHORPHAN HYDROBROMIDE AND PROMETHAZINE HYDROCHLORIDE 15; 6.25 MG/5ML; MG/5ML
5 SYRUP ORAL 4 TIMES DAILY PRN
Qty: 120 ML | Refills: 1 | Status: SHIPPED | OUTPATIENT
Start: 2023-01-30 | End: 2023-02-06 | Stop reason: SDUPTHER

## 2023-02-06 DIAGNOSIS — F51.04 CHRONIC INSOMNIA: ICD-10-CM

## 2023-02-06 DIAGNOSIS — R05.9 COUGH: ICD-10-CM

## 2023-02-07 RX ORDER — TEMAZEPAM 30 MG/1
30 CAPSULE ORAL
Qty: 90 CAPSULE | Refills: 0 | Status: SHIPPED | OUTPATIENT
Start: 2023-02-07

## 2023-02-07 RX ORDER — DEXTROMETHORPHAN HYDROBROMIDE AND PROMETHAZINE HYDROCHLORIDE 15; 6.25 MG/5ML; MG/5ML
5 SYRUP ORAL 4 TIMES DAILY PRN
Qty: 120 ML | Refills: 0 | Status: SHIPPED | OUTPATIENT
Start: 2023-02-07

## 2023-02-17 ENCOUNTER — TELEPHONE (OUTPATIENT)
Dept: PSYCHIATRY | Facility: CLINIC | Age: 63
End: 2023-02-17

## 2023-02-17 NOTE — TELEPHONE ENCOUNTER
Contacted patient in regards to Routine Referral in attempts to verify patient's needs of services and add patient to proper wait list  LVM for patient to contact intake dept  in regards to referral

## 2023-02-20 DIAGNOSIS — F41.9 ANXIETY: ICD-10-CM

## 2023-02-20 RX ORDER — ALPRAZOLAM 1 MG/1
2 TABLET ORAL 3 TIMES DAILY PRN
Qty: 180 TABLET | Refills: 0 | Status: SHIPPED | OUTPATIENT
Start: 2023-02-20

## 2023-03-20 DIAGNOSIS — F41.9 ANXIETY: ICD-10-CM

## 2023-03-21 RX ORDER — ALPRAZOLAM 1 MG/1
2 TABLET ORAL 3 TIMES DAILY PRN
Qty: 180 TABLET | Refills: 0 | Status: SHIPPED | OUTPATIENT
Start: 2023-03-21

## 2023-03-22 RX ORDER — ALPRAZOLAM 1 MG/1
2 TABLET ORAL 3 TIMES DAILY PRN
Qty: 180 TABLET | Refills: 0 | OUTPATIENT
Start: 2023-03-22

## 2023-04-03 ENCOUNTER — OFFICE VISIT (OUTPATIENT)
Dept: FAMILY MEDICINE CLINIC | Facility: CLINIC | Age: 63
End: 2023-04-03

## 2023-04-03 VITALS
SYSTOLIC BLOOD PRESSURE: 111 MMHG | TEMPERATURE: 97.6 F | HEART RATE: 108 BPM | DIASTOLIC BLOOD PRESSURE: 53 MMHG | OXYGEN SATURATION: 94 % | BODY MASS INDEX: 23.69 KG/M2 | RESPIRATION RATE: 18 BRPM | WEIGHT: 146.8 LBS

## 2023-04-03 DIAGNOSIS — F32.2 SEVERE DEPRESSION (HCC): ICD-10-CM

## 2023-04-03 DIAGNOSIS — L98.9 SKIN LESION: ICD-10-CM

## 2023-04-03 DIAGNOSIS — E44.0 MODERATE PROTEIN-CALORIE MALNUTRITION (HCC): ICD-10-CM

## 2023-04-03 DIAGNOSIS — F51.01 PRIMARY INSOMNIA: ICD-10-CM

## 2023-04-03 DIAGNOSIS — J43.9 PULMONARY EMPHYSEMA, UNSPECIFIED EMPHYSEMA TYPE (HCC): ICD-10-CM

## 2023-04-03 DIAGNOSIS — Z23 NEED FOR SHINGLES VACCINE: ICD-10-CM

## 2023-04-03 DIAGNOSIS — C34.91 NON-SMALL CELL CANCER OF RIGHT LUNG (HCC): ICD-10-CM

## 2023-04-03 DIAGNOSIS — R49.0 HOARSENESS OF VOICE: ICD-10-CM

## 2023-04-03 DIAGNOSIS — K86.1 OTHER CHRONIC PANCREATITIS (HCC): ICD-10-CM

## 2023-04-03 DIAGNOSIS — J43.1 PANLOBULAR EMPHYSEMA (HCC): ICD-10-CM

## 2023-04-03 DIAGNOSIS — Z11.4 SCREENING FOR HIV (HUMAN IMMUNODEFICIENCY VIRUS): Primary | ICD-10-CM

## 2023-04-03 RX ORDER — DOXYCYCLINE 100 MG/1
100 CAPSULE ORAL 2 TIMES DAILY
Qty: 14 CAPSULE | Refills: 0 | Status: SHIPPED | OUTPATIENT
Start: 2023-04-03 | End: 2023-04-10

## 2023-04-03 RX ORDER — ZOSTER VACCINE RECOMBINANT, ADJUVANTED 50 MCG/0.5
0.5 KIT INTRAMUSCULAR ONCE
Qty: 1 EACH | Refills: 1 | Status: SHIPPED | OUTPATIENT
Start: 2023-04-03 | End: 2023-04-03

## 2023-04-03 RX ORDER — ESZOPICLONE 2 MG/1
2 TABLET, FILM COATED ORAL
Qty: 30 TABLET | Refills: 5 | Status: SHIPPED | OUTPATIENT
Start: 2023-04-03

## 2023-04-03 RX ORDER — LEVALBUTEROL TARTRATE 45 UG/1
1-2 AEROSOL, METERED ORAL EVERY 4 HOURS PRN
Qty: 15 G | Refills: 0 | Status: SHIPPED | OUTPATIENT
Start: 2023-04-03

## 2023-04-03 NOTE — PROGRESS NOTES
Name: Kra Rodrigues      : 1960      MRN: [id number]  Encounter Provider: Obdulio Cooper MD  Encounter Date: 4/3/2023   Encounter department: [address] [place] [address]     1  Screening for HIV (human immunodeficiency virus)  -     HIV 1/2 AG/AB w Reflex [facility] for 2 yr old and above; Future    2  Need for shingles vaccine  -     Zoster Vac Recomb Adjuvanted (Shingrix) 50 MCG/0 5ML SUSR; Inject 0 5 mL into a muscle once for 1 dose Repeat dose in 2 to 6 months    3  Non-small cell cancer of right lung ([address] )    4  Moderate protein-calorie malnutrition ([facility] [address] )    5  Pulmonary emphysema, unspecified emphysema type ([address] )  -     fluticasone-umeclidinium-vilanterol 100-62 5-25 mcg/actuation inhaler; Inhale 1 puff daily Rinse mouth after use  6  Severe depression ([address] )    7  Other chronic pancreatitis ([facility] [address] )    8  Panlobular emphysema ([facility])  -     levalbuterol (XOPENEX HFA) 45 mcg/act inhaler; Inhale 1-2 puffs every 4 (four) hours as needed for wheezing or shortness of breath           Subjective      She desaturates to 88% on room air at 20 feet  She has cough and congestion  She has dyspnea and sputum production  She refuses steroids  Her depression is fairly well controlled at this point  She has no HI/SI  She has no side effects  Review of Systems   All other systems reviewed and are negative        Current Outpatient Medications on File Prior to Visit   Medication Sig   • ALPRAZolam (XANAX) 1 mg tablet Take 2 tablets (2 mg total) by mouth 3 (three) times a day as needed for anxiety   • atorvastatin (LIPITOR) 40 mg tablet Take 1 tablet (40 mg total) by mouth every evening   • folic acid (FOLVITE) 1 mg tablet Take by mouth daily   • ondansetron (ZOFRAN) 4 mg tablet Take 1 tablet (4 mg total) by mouth every 8 (eight) hours as needed for nausea or vomiting   • promethazine-dextromethorphan (PHENERGAN-DM) 6 25-15 mg/5 mL oral syrup Take 5 mL by mouth 4 (four) times a day as needed for cough   • sertraline (ZOLOFT) 100 mg tablet Take 1 tablet (100 mg total) by mouth daily   • temazepam (RESTORIL) 30 mg capsule Take 1 capsule (30 mg total) by mouth daily at bedtime   • [DISCONTINUED] levalbuterol (XOPENEX HFA) 45 mcg/act inhaler INHALE 1-2 PUFFS EVERY 4 (FOUR) HOURS AS NEEDED FOR WHEEZING OR SHORTNESS OF BREATH   • [DISCONTINUED] tiotropium (Spiriva HandiHaler) 18 mcg inhalation capsule Place 1 capsule (18 mcg total) into inhaler and inhale daily   • [DISCONTINUED] buPROPion (Wellbutrin XL) 150 mg 24 hr tablet Take 1 tablet (150 mg total) by mouth every morning (Patient not taking: Reported on 4/3/2023)   • [DISCONTINUED] cholecalciferol (VITAMIN D3) 400 units tablet Take 400 Units by mouth daily   • [DISCONTINUED] nicotine (NICODERM CQ) 21 mg/24 hr TD 24 hr patch Place 1 patch on the skin every 24 hours (Patient not taking: Reported on 4/3/2023)       Objective     /53 (BP Location: Left arm, Patient Position: Sitting, Cuff Size: Standard)   Pulse (!) 108   Temp 97 6 °F (36 4 °C) (Tympanic)   Resp 18   Wt 66 6 kg (146 lb 12 8 oz)   SpO2 94%   BMI 23 69 kg/m²     Physical Exam  Vitals and nursing note reviewed  Constitutional:       Appearance: She is well-developed  Cardiovascular:      Rate and Rhythm: Normal rate and regular rhythm  Heart sounds: Normal heart sounds  Pulmonary:      Effort: Pulmonary effort is normal       Breath sounds: Normal breath sounds  Abdominal:      General: Bowel sounds are normal       Palpations: Abdomen is soft  Skin:     General: Skin is warm  Capillary Refill: Capillary refill takes less than 2 seconds  Neurological:      General: No focal deficit present  Mental Status: She is alert and oriented to person, place, and time     Psychiatric:         Mood and Affect: Mood normal        Samuel Mckeon MD

## 2023-04-25 DIAGNOSIS — R05.9 COUGH: ICD-10-CM

## 2023-04-25 RX ORDER — DEXTROMETHORPHAN HYDROBROMIDE AND PROMETHAZINE HYDROCHLORIDE 15; 6.25 MG/5ML; MG/5ML
5 SYRUP ORAL 4 TIMES DAILY PRN
Qty: 120 ML | Refills: 0 | Status: SHIPPED | OUTPATIENT
Start: 2023-04-25 | End: 2023-05-01 | Stop reason: SDUPTHER

## 2023-05-01 DIAGNOSIS — R05.9 COUGH: ICD-10-CM

## 2023-05-01 DIAGNOSIS — J43.1 PANLOBULAR EMPHYSEMA (HCC): ICD-10-CM

## 2023-05-01 RX ORDER — LEVALBUTEROL TARTRATE 45 UG/1
1-2 AEROSOL, METERED ORAL EVERY 4 HOURS PRN
Qty: 15 G | Refills: 0 | Status: SHIPPED | OUTPATIENT
Start: 2023-05-01

## 2023-05-01 RX ORDER — DEXTROMETHORPHAN HYDROBROMIDE AND PROMETHAZINE HYDROCHLORIDE 15; 6.25 MG/5ML; MG/5ML
5 SYRUP ORAL 4 TIMES DAILY PRN
Qty: 120 ML | Refills: 0 | Status: SHIPPED | OUTPATIENT
Start: 2023-05-01

## 2023-05-04 DIAGNOSIS — Z12.31 SCREENING MAMMOGRAM FOR BREAST CANCER: Primary | ICD-10-CM

## 2023-05-09 ENCOUNTER — TELEPHONE (OUTPATIENT)
Dept: FAMILY MEDICINE CLINIC | Facility: CLINIC | Age: 63
End: 2023-05-09

## 2023-05-09 DIAGNOSIS — F51.01 PRIMARY INSOMNIA: Primary | ICD-10-CM

## 2023-05-09 DIAGNOSIS — R05.9 COUGH: ICD-10-CM

## 2023-05-09 RX ORDER — TEMAZEPAM 15 MG/1
15 CAPSULE ORAL
Qty: 30 CAPSULE | Refills: 0 | Status: SHIPPED | OUTPATIENT
Start: 2023-05-09 | End: 2023-05-15 | Stop reason: SDUPTHER

## 2023-05-09 NOTE — TELEPHONE ENCOUNTER
Pt calling stating she cannot take the lunesta  States she doesn't like how it makes her feel and her heart flutters   She is asking if you can call in the temazepam to [facility]nee

## 2023-05-10 RX ORDER — DEXTROMETHORPHAN HYDROBROMIDE AND PROMETHAZINE HYDROCHLORIDE 15; 6.25 MG/5ML; MG/5ML
5 SYRUP ORAL 4 TIMES DAILY PRN
Qty: 120 ML | Refills: 0 | Status: SHIPPED | OUTPATIENT
Start: 2023-05-10 | End: 2023-05-15 | Stop reason: SDUPTHER

## 2023-05-11 ENCOUNTER — RA CDI HCC (OUTPATIENT)
Dept: OTHER | Facility: HOSPITAL | Age: 63
End: 2023-05-11

## 2023-05-12 NOTE — PROGRESS NOTES
[facility]  coding opportunities       Chart reviewed, no opportunity found: CHART REVIEWED, Dustin Amaya [id number]     Patients Insurance     Medicare Insurance: [employer]

## 2023-05-15 ENCOUNTER — OFFICE VISIT (OUTPATIENT)
Dept: FAMILY MEDICINE CLINIC | Facility: CLINIC | Age: 63
End: 2023-05-15

## 2023-05-15 VITALS
BODY MASS INDEX: 23.15 KG/M2 | RESPIRATION RATE: 18 BRPM | HEART RATE: 110 BPM | TEMPERATURE: 98 F | OXYGEN SATURATION: 95 % | SYSTOLIC BLOOD PRESSURE: 105 MMHG | DIASTOLIC BLOOD PRESSURE: 58 MMHG | WEIGHT: 143.4 LBS

## 2023-05-15 DIAGNOSIS — F41.9 ANXIETY: ICD-10-CM

## 2023-05-15 DIAGNOSIS — E44.0 MODERATE PROTEIN-CALORIE MALNUTRITION (HCC): ICD-10-CM

## 2023-05-15 DIAGNOSIS — R05.9 COUGH: ICD-10-CM

## 2023-05-15 DIAGNOSIS — C34.91 NON-SMALL CELL CANCER OF RIGHT LUNG (HCC): ICD-10-CM

## 2023-05-15 DIAGNOSIS — E78.5 DYSLIPIDEMIA: ICD-10-CM

## 2023-05-15 DIAGNOSIS — R91.8 ENDOBRONCHIAL MASS: ICD-10-CM

## 2023-05-15 DIAGNOSIS — J45.909 UNCOMPLICATED ASTHMA, UNSPECIFIED ASTHMA SEVERITY, UNSPECIFIED WHETHER PERSISTENT: ICD-10-CM

## 2023-05-15 DIAGNOSIS — E78.2 MIXED HYPERLIPIDEMIA: ICD-10-CM

## 2023-05-15 DIAGNOSIS — D49.0 IPMN (INTRADUCTAL PAPILLARY MUCINOUS NEOPLASM): ICD-10-CM

## 2023-05-15 DIAGNOSIS — Z11.4 SCREENING FOR HIV (HUMAN IMMUNODEFICIENCY VIRUS): ICD-10-CM

## 2023-05-15 DIAGNOSIS — Z12.11 COLON CANCER SCREENING: ICD-10-CM

## 2023-05-15 DIAGNOSIS — M79.7 FIBROMYALGIA: ICD-10-CM

## 2023-05-15 DIAGNOSIS — C34.91 ADENOSQUAMOUS CARCINOMA OF RIGHT LUNG (HCC): ICD-10-CM

## 2023-05-15 DIAGNOSIS — F51.01 PRIMARY INSOMNIA: ICD-10-CM

## 2023-05-15 DIAGNOSIS — Z00.00 MEDICARE ANNUAL WELLNESS VISIT, SUBSEQUENT: Primary | ICD-10-CM

## 2023-05-15 DIAGNOSIS — F32.2 SEVERE DEPRESSION (HCC): ICD-10-CM

## 2023-05-15 DIAGNOSIS — K86.1 OTHER CHRONIC PANCREATITIS (HCC): ICD-10-CM

## 2023-05-15 DIAGNOSIS — J43.1 PANLOBULAR EMPHYSEMA (HCC): ICD-10-CM

## 2023-05-15 DIAGNOSIS — Z12.4 SCREENING FOR CERVICAL CANCER: ICD-10-CM

## 2023-05-15 DIAGNOSIS — F43.10 PTSD (POST-TRAUMATIC STRESS DISORDER): ICD-10-CM

## 2023-05-15 RX ORDER — ALPRAZOLAM 1 MG/1
2 TABLET ORAL 3 TIMES DAILY PRN
Qty: 180 TABLET | Refills: 5 | Status: SHIPPED | OUTPATIENT
Start: 2023-05-15

## 2023-05-15 RX ORDER — LEVALBUTEROL TARTRATE 45 UG/1
1-2 AEROSOL, METERED ORAL EVERY 4 HOURS PRN
Qty: 15 G | Refills: 5 | Status: SHIPPED | OUTPATIENT
Start: 2023-05-15

## 2023-05-15 RX ORDER — ATORVASTATIN CALCIUM 40 MG/1
40 TABLET, FILM COATED ORAL EVERY EVENING
Qty: 90 TABLET | Refills: 3 | Status: SHIPPED | OUTPATIENT
Start: 2023-05-15 | End: 2023-08-13

## 2023-05-15 RX ORDER — TEMAZEPAM 30 MG/1
30 CAPSULE ORAL
Qty: 90 CAPSULE | Refills: 3 | Status: SHIPPED | OUTPATIENT
Start: 2023-05-15

## 2023-05-15 RX ORDER — DEXTROMETHORPHAN HYDROBROMIDE AND PROMETHAZINE HYDROCHLORIDE 15; 6.25 MG/5ML; MG/5ML
5 SYRUP ORAL 4 TIMES DAILY PRN
Qty: 120 ML | Refills: 5 | Status: SHIPPED | OUTPATIENT
Start: 2023-05-15

## 2023-05-15 NOTE — PATIENT INSTRUCTIONS
Medicare Preventive Visit Patient Instructions  Thank you for completing your Welcome to Medicare Visit or Medicare Annual Wellness Visit today  Your next wellness visit will be due in one year (5/15/2024)  The screening/preventive services that you may require over the next 5-10 years are detailed below  Some tests may not apply to you based off risk factors and/or age  Screening tests ordered at today's visit but not completed yet may show as past due  Also, please note that scanned in results may not display below  Preventive Screenings:  Service Recommendations Previous Testing/Comments   Colorectal Cancer Screening  * Colonoscopy    * Fecal Occult Blood Test (FOBT)/Fecal Immunochemical Test (FIT)  * Fecal DNA/Cologuard Test  * Flexible Sigmoidoscopy Age: 39-70 years old   Colonoscopy: every 10 years (may be performed more frequently if at higher risk)  OR  FOBT/FIT: every 1 year  OR  Cologuard: every 3 years  OR  Sigmoidoscopy: every 5 years  Screening may be recommended earlier than age 39 if at higher risk for colorectal cancer  Also, an individualized decision between you and your healthcare provider will decide whether screening between the ages of 74-80 would be appropriate  Colonoscopy: 01/01/2005  FOBT/FIT: Not on file  Cologuard: Not on file  Sigmoidoscopy: Not on file          Breast Cancer Screening Age: 36 years old  Frequency: every 1-2 years  Not required if history of left and right mastectomy Mammogram: 01/01/2009        Cervical Cancer Screening Between the ages of 21-29, pap smear recommended once every 3 years  Between the ages of 33-67, can perform pap smear with HPV co-testing every 5 years     Recommendations may differ for women with a history of total hysterectomy, cervical cancer, or abnormal pap smears in past  Pap Smear: Not on file        Hepatitis C Screening Once for adults born between [facility]  More frequently in patients at high risk for Hepatitis C Hep C Antibody: 01/04/2018    Screening Current   Diabetes Screening 1-2 times per year if you're at risk for diabetes or have pre-diabetes Fasting glucose: No results in last 5 years (No results in last 5 years)  A1C: 5 6 % (2/17/2019)      Cholesterol Screening Once every 5 years if you don't have a lipid disorder  May order more often based on risk factors  Lipid panel: 11/26/2021    Screening Not Indicated  History Lipid Disorder     Other Preventive Screenings Covered by Medicare:  1  Abdominal Aortic Aneurysm (AAA) Screening: covered once if your at risk  You're considered to be at risk if you have a family history of AAA  2  Lung Cancer Screening: covers low dose CT scan once per year if you meet all of the following conditions: (1) Age 50-69; (2) No signs or symptoms of lung cancer; (3) Current smoker or have quit smoking within the last 15 years; (4) You have a tobacco smoking history of at least 20 pack years (packs per day multiplied by number of years you smoked); (5) You get a written order from a healthcare provider  3  Glaucoma Screening: covered annually if you're considered high risk: (1) You have diabetes OR (2) Family history of glaucoma OR (3)  aged 48 and older OR (3)  American aged 72 and older  3  Osteoporosis Screening: covered every 2 years if you meet one of the following conditions: (1) You're estrogen deficient and at risk for osteoporosis based off medical history and other findings; (2) Have a vertebral abnormality; (3) On glucocorticoid therapy for more than 3 months; (4) Have primary hyperparathyroidism; (5) On osteoporosis medications and need to assess response to drug therapy  · Last bone density test (DXA Scan): Not on file  5  HIV Screening: covered annually if you're between the age of 12-76  Also covered annually if you are younger than 13 and older than 72 with risk factors for HIV infection   For pregnant patients, it is covered up to 3 times per pregnancy  Immunizations:  Immunization Recommendations   Influenza Vaccine Annual influenza vaccination during flu season is recommended for all persons aged >= 6 months who do not have contraindications   Pneumococcal Vaccine   * Pneumococcal conjugate vaccine = PCV13 (Prevnar 13), PCV15 (Vaxneuvance), PCV20 (Prevnar 20)  * Pneumococcal polysaccharide vaccine = PPSV23 (Pneumovax) Adults 25-60 years old: 1-3 doses may be recommended based on certain risk factors  Adults 72 years old: 1-2 doses may be recommended based off what pneumonia vaccine you previously received   Hepatitis B Vaccine 3 dose series if at intermediate or high risk (ex: diabetes, end stage renal disease, liver disease)   Tetanus (Td) Vaccine - COST NOT COVERED BY MEDICARE PART B Following completion of primary series, a booster dose should be given every 10 years to maintain immunity against tetanus  Td may also be given as tetanus wound prophylaxis  Tdap Vaccine - COST NOT COVERED BY MEDICARE PART B Recommended at least once for all adults  For pregnant patients, recommended with each pregnancy  Shingles Vaccine (Shingrix) - COST NOT COVERED BY MEDICARE PART B  2 shot series recommended in those aged 48 and above     Health Maintenance Due:      Topic Date Due   • HIV Screening  Never done   • Cervical Cancer Screening  Never done   • Breast Cancer Screening: Mammogram  Never done   • Colorectal Cancer Screening  Never done   • Hepatitis C Screening  Completed     Immunizations Due:      Topic Date Due   • COVID-19 Vaccine (5 - Booster for Kyle Neer series) 04/12/2022     Advance Directives   What are advance directives? Advance directives are legal documents that state your wishes and plans for medical care  These plans are made ahead of time in case you lose your ability to make decisions for yourself  Advance directives can apply to any medical decision, such as the treatments you want, and if you want to donate organs     What are the types of advance directives? There are many types of advance directives, and each state has rules about how to use them  You may choose a combination of any of the following:  · Living will: This is a written record of the treatment you want  You can also choose which treatments you do not want, which to limit, and which to stop at a certain time  This includes surgery, medicine, IV fluid, and tube feedings  · Durable power of  for healthcare [facility]): This is a written record that states who you want to make healthcare choices for you when you are unable to make them for yourself  This person, called a proxy, is usually a family member or a friend  You may choose more than 1 proxy  · Do not resuscitate (DNR) order:  A DNR order is used in case your heart stops beating or you stop breathing  It is a request not to have certain forms of treatment, such as CPR  A DNR order may be included in other types of advance directives  · Medical directive: This covers the care that you want if you are in a coma, near death, or unable to make decisions for yourself  You can list the treatments you want for each condition  Treatment may include pain medicine, surgery, blood transfusions, dialysis, IV or tube feedings, and a ventilator (breathing machine)  · Values history: This document has questions about your views, beliefs, and how you feel and think about life  This information can help others choose the care that you would choose  Why are advance directives important? An advance directive helps you control your care  Although spoken wishes may be used, it is better to have your wishes written down  Spoken wishes can be misunderstood, or not followed  Treatments may be given even if you do not want them  An advance directive may make it easier for your family to make difficult choices about your care     Cigarette Smoking and Your Health   Risks to your health if you smoke:  Nicotine and other chemicals found in tobacco damage every cell in your body  Even if you are a light smoker, you have an increased risk for cancer, heart disease, and lung disease  If you are pregnant or have diabetes, smoking increases your risk for complications  Benefits to your health if you stop smoking:   · You decrease respiratory symptoms such as coughing, wheezing, and shortness of breath  · You reduce your risk for cancers of the lung, mouth, throat, kidney, bladder, pancreas, stomach, and cervix  If you already have cancer, you increase the benefits of chemotherapy  You also reduce your risk for cancer returning or a second cancer from developing  · You reduce your risk for heart disease, blood clots, heart attack, and stroke  · You reduce your risk for lung infections, and diseases such as pneumonia, asthma, chronic bronchitis, and emphysema  · Your circulation improves  More oxygen can be delivered to your body  If you have diabetes, you lower your risk for complications, such as kidney, artery, and eye diseases  You also lower your risk for nerve damage  Nerve damage can lead to amputations, poor vision, and blindness  · You improve your body's ability to heal and to fight infections  For more information and support to stop smoking:   · Smokefree  gov  Phone: 6- 403 - 326-6973  Web Address: www kapturem  [facility] 2018 Information is for End User's use only and may not be sold, redistributed or otherwise used for commercial purposes   All illustrations and images included in CareNotes® are the copyrighted property of [employer]  or [address] [place] [address]

## 2023-05-15 NOTE — PROGRESS NOTES
Name: Rhiannon Hurley      : 1960      MRN: [id number]  Encounter Provider: Radha Mcgovern MD  Encounter Date: 5/15/2023   Encounter department: [address] [place] [address]     1  Medicare annual wellness visit, subsequent    2  Screening for HIV (human immunodeficiency virus)  -     HIV 1/2 AG/AB w Reflex [facility] for 2 yr old and above; Future  -     Lipid panel; Future  -     Comprehensive metabolic panel; Future; Expected date: 2023  -     TSH, 3rd generation with Free T4 reflex; Future  -     HEMOGLOBIN A1C W/ EAG ESTIMATION; Future    3  Screening for cervical cancer  -     Lipid panel; Future  -     Comprehensive metabolic panel; Future; Expected date: 2023  -     TSH, 3rd generation with Free T4 reflex; Future  -     HEMOGLOBIN A1C W/ EAG ESTIMATION; Future    4  Other chronic pancreatitis ([facility] [address] )  -     Lipid panel; Future  -     Comprehensive metabolic panel; Future; Expected date: 2023  -     TSH, 3rd generation with Free T4 reflex; Future  -     HEMOGLOBIN A1C W/ EAG ESTIMATION; Future  -     Amylase; Future  -     Lipase; Future  -     CT abdomen pelvis w contrast; Future; Expected date: 05/15/2023    5  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[facility] [address] )  -     Lipid panel; Future  -     Comprehensive metabolic panel; Future; Expected date: 2023  -     TSH, 3rd generation with Free T4 reflex; Future  -     HEMOGLOBIN A1C W/ EAG ESTIMATION; Future  -     Complete PFT with post bronchodilator; Future    6  Uncomplicated asthma, unspecified asthma severity, unspecified whether persistent  -     Lipid panel; Future  -     Comprehensive metabolic panel; Future; Expected date: 2023  -     TSH, 3rd generation with Free T4 reflex; Future  -     HEMOGLOBIN A1C W/ EAG ESTIMATION; Future  -     Complete PFT with post bronchodilator; Future    7  Panlobular emphysema ([facility] [address] )  -     Lipid panel; Future  -     Comprehensive metabolic panel;  Future; Expected date: 05/16/2023  -     TSH, 3rd generation with Free T4 reflex; Future  -     HEMOGLOBIN A1C W/ EAG ESTIMATION; Future  -     levalbuterol (XOPENEX HFA) 45 mcg/act inhaler; Inhale 1-2 puffs every 4 (four) hours as needed for wheezing or shortness of breath  -     Complete PFT with post bronchodilator; Future    8  Endobronchial mass  -     Lipid panel; Future  -     Comprehensive metabolic panel; Future; Expected date: 05/16/2023  -     TSH, 3rd generation with Free T4 reflex; Future  -     HEMOGLOBIN A1C W/ EAG ESTIMATION; Future  -     Complete PFT with post bronchodilator; Future    9  Non-small cell cancer of right lung (HCC)  -     Lipid panel; Future  -     Comprehensive metabolic panel; Future; Expected date: 05/16/2023  -     TSH, 3rd generation with Free T4 reflex; Future  -     HEMOGLOBIN A1C W/ EAG ESTIMATION; Future  -     Complete PFT with post bronchodilator; Future    10  Dyslipidemia  -     Lipid panel; Future  -     Comprehensive metabolic panel; Future; Expected date: 05/16/2023  -     TSH, 3rd generation with Free T4 reflex; Future  -     HEMOGLOBIN A1C W/ EAG ESTIMATION; Future    11  Fibromyalgia  -     Lipid panel; Future  -     Comprehensive metabolic panel; Future; Expected date: 05/16/2023  -     TSH, 3rd generation with Free T4 reflex; Future  -     HEMOGLOBIN A1C W/ EAG ESTIMATION; Future    12  Moderate protein-calorie malnutrition ([facility] [address] )  -     Lipid panel; Future  -     Comprehensive metabolic panel; Future; Expected date: 05/16/2023  -     TSH, 3rd generation with Free T4 reflex; Future  -     HEMOGLOBIN A1C W/ EAG ESTIMATION; Future    13  Severe depression ([address] )  -     Lipid panel; Future  -     Comprehensive metabolic panel; Future; Expected date: 05/16/2023  -     TSH, 3rd generation with Free T4 reflex; Future  -     HEMOGLOBIN A1C W/ EAG ESTIMATION; Future    14  PTSD (post-traumatic stress disorder)  -     Lipid panel; Future  -     Comprehensive metabolic panel;  Future; Expected date: 05/16/2023  -     TSH, 3rd generation with Free T4 reflex; Future  -     HEMOGLOBIN A1C W/ EAG ESTIMATION; Future    15  Colon cancer screening  -     Cologuard    16  Anxiety  -     ALPRAZolam (XANAX) 1 mg tablet; Take 2 tablets (2 mg total) by mouth 3 (three) times a day as needed for anxiety    17  Primary insomnia  -     temazepam (RESTORIL) 30 mg capsule; Take 1 capsule (30 mg total) by mouth daily at bedtime as needed for sleep    18  Mixed hyperlipidemia  -     atorvastatin (LIPITOR) 40 mg tablet; Take 1 tablet (40 mg total) by mouth every evening    19  Cough  -     promethazine-dextromethorphan (PHENERGAN-DM) 6 25-15 mg/5 mL oral syrup; Take 5 mL by mouth 4 (four) times a day as needed for cough  -     Complete PFT with post bronchodilator; Future    20  IPMN (intraductal papillary mucinous neoplasm)  -     CT abdomen pelvis w contrast; Future; Expected date: 05/15/2023           Subjective      She is having conflict with her neighbors  She is quite stressed about it  She has depression and anxiety  She has no side effects from her current regimen  She has chronic pancreatitis  She has no abdominal pain, N/V/D  She has asthma and COPD  She is doing well on her current regimen  She has no wheezing, CP, SOB, or sputum production  Her lung CA is in remission  She has epigastric pain  Her weight is stable  Review of Systems   All other systems reviewed and are negative        Current Outpatient Medications on File Prior to Visit   Medication Sig   • folic acid (FOLVITE) 1 mg tablet Take by mouth daily   • mupirocin (BACTROBAN) 2 % ointment Apply topically 3 (three) times a day   • ondansetron (ZOFRAN) 4 mg tablet Take 1 tablet (4 mg total) by mouth every 8 (eight) hours as needed for nausea or vomiting   • sertraline (ZOLOFT) 100 mg tablet Take 1 tablet (100 mg total) by mouth daily (Patient taking differently: Take 50 mg by mouth daily)   • [DISCONTINUED] ALPRAZolam (XANAX) 1 mg tablet Take 2 tablets (2 mg total) by mouth 3 (three) times a day as needed for anxiety   • [DISCONTINUED] atorvastatin (LIPITOR) 40 mg tablet Take 1 tablet (40 mg total) by mouth every evening   • [DISCONTINUED] levalbuterol (XOPENEX HFA) 45 mcg/act inhaler Inhale 1-2 puffs every 4 (four) hours as needed for wheezing or shortness of breath   • [DISCONTINUED] promethazine-dextromethorphan (PHENERGAN-DM) 6 25-15 mg/5 mL oral syrup Take 5 mL by mouth 4 (four) times a day as needed for cough   • [DISCONTINUED] temazepam (RESTORIL) 15 mg capsule Take 1 capsule (15 mg total) by mouth daily at bedtime as needed for sleep   • [DISCONTINUED] fluticasone-umeclidinium-vilanterol 100-62 5-25 mcg/actuation inhaler Inhale 1 puff daily Rinse mouth after use  Objective     /58 (BP Location: Left arm, Patient Position: Sitting, Cuff Size: Standard)   Pulse (!) 110   Temp 98 °F (36 7 °C) (Tympanic)   Resp 18   Wt 65 kg (143 lb 6 4 oz)   SpO2 95%   BMI 23 15 kg/m²     Physical Exam  Vitals and nursing note reviewed  Constitutional:       Appearance: Normal appearance  She is normal weight  Cardiovascular:      Rate and Rhythm: Normal rate and regular rhythm  Pulses: Normal pulses  Heart sounds: Normal heart sounds  Pulmonary:      Effort: Pulmonary effort is normal       Breath sounds: Normal breath sounds  Abdominal:      General: Abdomen is flat  Bowel sounds are normal       Palpations: Abdomen is soft  Musculoskeletal:         General: Normal range of motion  Cervical back: Normal range of motion and neck supple  Skin:     General: Skin is warm and dry  Capillary Refill: Capillary refill takes less than 2 seconds  Neurological:      General: No focal deficit present  Mental Status: She is alert and oriented to person, place, and time  Mental status is at baseline     Psychiatric:         Mood and Affect: Mood normal          Behavior: Behavior normal          Thought Content:  Thought content normal          Judgment: Judgment normal        Shiv Acuña MD

## 2023-05-15 NOTE — PROGRESS NOTES
Assessment and Plan:     Problem List Items Addressed This Visit    None  Visit Diagnoses     Screening for HIV (human immunodeficiency virus)        Screening for cervical cancer               Preventive health issues were discussed with patient, and age appropriate screening tests were ordered as noted in patient's After Visit Summary  Personalized health advice and appropriate referrals for health education or preventive services given if needed, as noted in patient's After Visit Summary       History of Present Illness:     Patient presents for a Medicare Wellness Visit    HPI   Patient Care Team:  Kristen Borges MD as PCP - General (Family Medicine)  Kristen Borges MD as PCP - [address] [place] [address] (RTE)  [facility]  Nakul Lagos MD (Oncology)  Kelly Bower MD (Radiation Oncology)  [facility] (Ophthalmology)     Review of Systems:     Review of Systems     Problem List:     Patient Active Problem List   Diagnosis   • Adenosquamous carcinoma of right lung [facility])   • Anemia   • Asthma   • Chronic pancreatitis ([facility] [address] )   • COPD with emphysema ([address] )   • Degeneration of lumbosacral intervertebral disc   • Dyslipidemia   • Endobronchial mass   • Fibromyalgia   • Gastroesophageal reflux disease   • Non-small cell cancer of right lung (HCC)   • Restless legs syndrome   • Anxiety   • Protein calorie malnutrition ([address] )   • Chronic cough   • Chronic insomnia   • Severe depression ([address] )   • PTSD (post-traumatic stress disorder)      Past Medical and Surgical History:     Past Medical History:   Diagnosis Date   • Acute respiratory failure with hypoxia ([address] ) 7/31/2020   • Cancer ([address] )    • Diabetes mellitus ([address] )     borderline   • Irregular heartbeat    • Pancreatitis     Pre cancerous lesions per Norma [place]     Past Surgical History:   Procedure Laterality Date   • BRONCHOSCOPY     • CHOLECYSTECTOMY        Family History:     Family History   Problem Relation Age of Onset   • Cancer Mother    • COPD Mother    • Diabetes Maternal Grandfather       Social History:     Social History     Socioeconomic History   • Marital status: /Civil Union     Spouse name: None   • Number of children: None   • Years of education: None   • Highest education level: None   Occupational History   • None   Tobacco Use   • Smoking status: Every Day     Packs/day: 0 50     Years: 40 00     Pack years: 20 00     Types: Cigarettes     Last attempt to quit: 2019     Years since quittin 3   • Smokeless tobacco: Never   Vaping Use   • Vaping Use: Never used   Substance and Sexual Activity   • Alcohol use: No   • Drug use: No   • Sexual activity: None   Other Topics Concern   • None   Social History Narrative   • None     Social Determinants of Health     Financial Resource Strain: Low Risk    • Difficulty of Paying Living Expenses: Not very hard   Food Insecurity: Not on file   Transportation Needs: No Transportation Needs   • Lack of Transportation (Medical): No   • Lack of Transportation (Non-Medical):  No   Physical Activity: Not on file   Stress: Not on file   Social Connections: Not on file   Intimate Partner Violence: Not on file   Housing Stability: Not on file      Medications and Allergies:     Current Outpatient Medications   Medication Sig Dispense Refill   • ALPRAZolam (XANAX) 1 mg tablet Take 2 tablets (2 mg total) by mouth 3 (three) times a day as needed for anxiety 180 tablet 0   • atorvastatin (LIPITOR) 40 mg tablet Take 1 tablet (40 mg total) by mouth every evening 90 tablet 1   • folic acid (FOLVITE) 1 mg tablet Take by mouth daily     • levalbuterol (XOPENEX HFA) 45 mcg/act inhaler Inhale 1-2 puffs every 4 (four) hours as needed for wheezing or shortness of breath 15 g 0   • mupirocin (BACTROBAN) 2 % ointment Apply topically 3 (three) times a day 22 g 0   • ondansetron (ZOFRAN) 4 mg tablet Take 1 tablet (4 mg total) by mouth every 8 (eight) hours as needed for nausea or vomiting 20 tablet 0   • promethazine-dextromethorphan (PHENERGAN-DM) 6 25-15 mg/5 mL oral syrup Take 5 mL by mouth 4 (four) times a day as needed for cough 120 mL 0   • sertraline (ZOLOFT) 100 mg tablet Take 1 tablet (100 mg total) by mouth daily (Patient taking differently: Take 50 mg by mouth daily) 90 tablet 1   • temazepam (RESTORIL) 15 mg capsule Take 1 capsule (15 mg total) by mouth daily at bedtime as needed for sleep 30 capsule 0   • fluticasone-umeclidinium-vilanterol 100-62 5-25 mcg/actuation inhaler Inhale 1 puff daily Rinse mouth after use  1 each 5     No current facility-administered medications for this visit       Allergies   Allergen Reactions   • Contrast [Iodinated Contrast Media] Hives, Throat Swelling and Facial Swelling   • Sulfa Antibiotics Rash and Palpitations   • [place] Flavor - Food Allergy Hives   • Antihistamine & Nasal Deconges [Fexofenadine-Pseudoephed Er] Other (See Comments)     unknown   • Codeine Headache     Severe migraines per pt   • Lisinopril Cough   • Mangifera Indica Other (See Comments)     unknown   • Nsaids Other (See Comments)     daypro, orudis    can take motrin   • Other Other (See Comments)     Steroid shots:  Heart race, shakes   • Poison Ivy Extract Other (See Comments)     Steroid shots:  Heart race, shakes  [place]angoes: rash   • Naproxen Rash   • Prednisone Palpitations      Immunizations:     Immunization History   Administered Date(s) Administered   • COVID-19 MODERNA VACC 0 5 ML IM 02/17/2021, 03/17/2021, 09/02/2021, 02/15/2022   • Pneumococcal Conjugate 13-Valent 03/25/2022   • Pneumococcal Polysaccharide PPV23 01/20/2021   • Tdap 08/02/2007      Health Maintenance:         Topic Date Due   • HIV Screening  Never done   • Cervical Cancer Screening  Never done   • Breast Cancer Screening: Mammogram  Never done   • Colorectal Cancer Screening  Never done   • Hepatitis C Screening  Completed         Topic Date Due   • COVID-19 Vaccine (5 - Booster for Moderna series) 04/12/2022      Medicare Screening Tests and Risk Assessments:     Azael Crespo is here for her Subsequent Wellness visit  Last Medicare Wellness visit information reviewed, patient interviewed and updates made to the record today  Health Risk Assessment:   Patient rates overall health as poor  Patient feels that their physical health rating is much worse  Patient is dissatisfied with their life  Eyesight was rated as much worse  Hearing was rated as much worse  Patient feels that their emotional and mental health rating is much worse  Patients states they are often angry  Patient states they are sometimes unusually tired/fatigued  Pain experienced in the last 7 days has been some  Patient's pain rating has been 5/10  Patient states that she has experienced weight loss or gain in last 6 months  Fall Risk Screening: In the past year, patient has experienced: no history of falling in past year      Urinary Incontinence Screening:   Patient has not leaked urine accidently in the last six months  Home Safety:  Patient does not have trouble with stairs inside or outside of their home  Patient has working smoke alarms and has working carbon monoxide detector  Home safety hazards include: none  Nutrition:   Current diet is Other (please comment)  High protein diet    Medications:   Patient is currently taking over-the-counter supplements  OTC medications include: see medication list  Patient is able to manage medications  Activities of Daily Living (ADLs)/Instrumental Activities of Daily Living (IADLs):   Walk and transfer into and out of bed and chair?: Yes  Dress and groom yourself?: Yes    Bathe or shower yourself?: Yes    Feed yourself?  Yes  Do your laundry/housekeeping?: Yes  Manage your money, pay your bills and track your expenses?: Yes  Make your own meals?: Yes    Do your own shopping?: No    Previous Hospitalizations:   Any hospitalizations or ED visits within the last 12 months?: No Cognitive Screening:   Provider or family/friend/caregiver concerned regarding cognition?: No    PREVENTIVE SCREENINGS      Cardiovascular Screening:    General: Screening Not Indicated and History Lipid Disorder      Diabetes Screening:     General: Screening Current      Colorectal Cancer Screening:     General: Screening Current      Breast Cancer Screening:     General: Screening Current      Cervical Cancer Screening:    General: Screening Current      Osteoporosis Screening:    General: Screening Not Indicated      Abdominal Aortic Aneurysm (AAA) Screening:        General: Screening Not Indicated      Lung Cancer Screening:     General: Screening Not Indicated and History Lung Cancer      Hepatitis C Screening:    General: Screening Current    Screening, Brief Intervention, and Referral to Treatment (SBIRT)    Screening  Typical number of drinks in a day: 0  Typical number of drinks in a week: 0  Interpretation: Low risk drinking behavior  Single Item Drug Screening:  How often have you used an illegal drug (including marijuana) or a prescription medication for non-medical reasons in the past year? never    Single Item Drug Screen Score: 0  Interpretation: Negative screen for possible drug use disorder    No results found       Physical Exam:     /58 (BP Location: Left arm, Patient Position: Sitting, Cuff Size: Standard)   Pulse (!) 110   Temp 98 °F (36 7 °C) (Tympanic)   Resp 18   Wt 65 kg (143 lb 6 4 oz)   SpO2 95%   BMI 23 15 kg/m²     Physical Exam     Brice Escoto MD

## 2023-05-19 ENCOUNTER — TELEPHONE (OUTPATIENT)
Dept: FAMILY MEDICINE CLINIC | Facility: CLINIC | Age: 63
End: 2023-05-19

## 2023-05-19 NOTE — LETTER
To Whom It May Concern:      Due to Mrs Echols Miguel health conditions her  is her primary caretaker  She needs his help with daily personal needs and shopping  She cannot handle things on her own within her house         Please do not hesitate to call with any questions    Sincerely         Uma Scott

## 2023-05-19 NOTE — TELEPHONE ENCOUNTER
Pt asking if you can write a letter stating that pts spouse is her care giver   States this is for his

## 2023-07-10 ENCOUNTER — TELEPHONE (OUTPATIENT)
Dept: FAMILY MEDICINE CLINIC | Facility: CLINIC | Age: 63
End: 2023-07-10

## 2023-07-10 DIAGNOSIS — K04.7 DENTAL INFECTION: Primary | ICD-10-CM

## 2023-07-10 RX ORDER — AMOXICILLIN 500 MG/1
500 CAPSULE ORAL EVERY 8 HOURS SCHEDULED
Qty: 21 CAPSULE | Refills: 0 | Status: SHIPPED | OUTPATIENT
Start: 2023-07-10 | End: 2023-07-17

## 2023-08-03 DIAGNOSIS — J43.1 PANLOBULAR EMPHYSEMA (HCC): ICD-10-CM

## 2023-08-03 RX ORDER — LEVALBUTEROL TARTRATE 45 UG/1
1-2 AEROSOL, METERED ORAL EVERY 4 HOURS PRN
Qty: 15 G | Refills: 0 | Status: SHIPPED | OUTPATIENT
Start: 2023-08-03

## 2023-08-04 ENCOUNTER — TELEPHONE (OUTPATIENT)
Dept: FAMILY MEDICINE CLINIC | Facility: CLINIC | Age: 63
End: 2023-08-04

## 2023-08-04 DIAGNOSIS — J43.1 PANLOBULAR EMPHYSEMA (HCC): Primary | ICD-10-CM

## 2023-08-04 RX ORDER — ALBUTEROL SULFATE 90 UG/1
2 AEROSOL, METERED RESPIRATORY (INHALATION) EVERY 6 HOURS PRN
Qty: 18 G | Refills: 3 | Status: SHIPPED | OUTPATIENT
Start: 2023-08-04

## 2023-08-04 NOTE — TELEPHONE ENCOUNTER
Please send in albuterol inhaler for the pt as they cannot get her levalbuterol in until Monday or Tuesday and pt is completely out of her inhaler

## 2023-08-07 ENCOUNTER — TELEPHONE (OUTPATIENT)
Dept: FAMILY MEDICINE CLINIC | Facility: CLINIC | Age: 63
End: 2023-08-07

## 2023-08-08 DIAGNOSIS — R69 SICK: Primary | ICD-10-CM

## 2023-08-08 RX ORDER — DOXYCYCLINE 100 MG/1
100 CAPSULE ORAL 2 TIMES DAILY
Qty: 14 CAPSULE | Refills: 0 | Status: SHIPPED | OUTPATIENT
Start: 2023-08-08 | End: 2023-08-15

## 2023-08-10 DIAGNOSIS — R05.9 COUGH: ICD-10-CM

## 2023-08-11 RX ORDER — DEXTROMETHORPHAN HYDROBROMIDE AND PROMETHAZINE HYDROCHLORIDE 15; 6.25 MG/5ML; MG/5ML
5 SYRUP ORAL 4 TIMES DAILY PRN
Qty: 120 ML | Refills: 0 | Status: SHIPPED | OUTPATIENT
Start: 2023-08-11

## 2023-08-24 DIAGNOSIS — J43.1 PANLOBULAR EMPHYSEMA (HCC): ICD-10-CM

## 2023-08-24 RX ORDER — LEVALBUTEROL TARTRATE 45 UG/1
1-2 AEROSOL, METERED ORAL EVERY 4 HOURS PRN
Qty: 15 G | Refills: 0 | Status: SHIPPED | OUTPATIENT
Start: 2023-08-24 | End: 2023-08-24 | Stop reason: SDUPTHER

## 2023-08-25 ENCOUNTER — TELEPHONE (OUTPATIENT)
Dept: FAMILY MEDICINE CLINIC | Facility: CLINIC | Age: 63
End: 2023-08-25

## 2023-08-25 NOTE — TELEPHONE ENCOUNTER
Patient called to check on the status of a medication refill. The prescription shows that it was approved and sent to the pharmacy.

## 2023-08-27 RX ORDER — LEVALBUTEROL TARTRATE 45 UG/1
1-2 AEROSOL, METERED ORAL EVERY 4 HOURS PRN
Qty: 15 G | Refills: 0 | Status: SHIPPED | OUTPATIENT
Start: 2023-08-27 | End: 2023-09-07 | Stop reason: SDUPTHER

## 2023-08-30 DIAGNOSIS — Z12.11 COLON CANCER SCREENING: ICD-10-CM

## 2023-08-30 DIAGNOSIS — Z12.31 BREAST CANCER SCREENING BY MAMMOGRAM: Primary | ICD-10-CM

## 2023-09-01 DIAGNOSIS — R05.9 COUGH: ICD-10-CM

## 2023-09-01 RX ORDER — DEXTROMETHORPHAN HYDROBROMIDE AND PROMETHAZINE HYDROCHLORIDE 15; 6.25 MG/5ML; MG/5ML
5 SYRUP ORAL 4 TIMES DAILY PRN
Qty: 120 ML | Refills: 0 | Status: SHIPPED | OUTPATIENT
Start: 2023-09-01

## 2023-09-07 DIAGNOSIS — J43.1 PANLOBULAR EMPHYSEMA (HCC): ICD-10-CM

## 2023-09-07 RX ORDER — LEVALBUTEROL TARTRATE 45 UG/1
1-2 AEROSOL, METERED ORAL EVERY 4 HOURS PRN
Qty: 15 G | Refills: 0 | Status: SHIPPED | OUTPATIENT
Start: 2023-09-07

## 2023-09-20 ENCOUNTER — TELEPHONE (OUTPATIENT)
Dept: FAMILY MEDICINE CLINIC | Facility: CLINIC | Age: 63
End: 2023-09-20

## 2023-09-20 DIAGNOSIS — J43.1 PANLOBULAR EMPHYSEMA (HCC): ICD-10-CM

## 2023-09-20 RX ORDER — LEVALBUTEROL TARTRATE 45 UG/1
2 AEROSOL, METERED ORAL EVERY 4 HOURS PRN
Qty: 15 G | Refills: 5 | Status: SHIPPED | OUTPATIENT
Start: 2023-09-20

## 2023-09-20 NOTE — TELEPHONE ENCOUNTER
Pt needs refill on her levalbuterol inhaler but needs the rx changed from 1-2 puff to 2 puff every 4 hours.  States she is getting an issue from insurance regarding it saying 1-2 puffs

## 2023-10-05 ENCOUNTER — RA CDI HCC (OUTPATIENT)
Dept: OTHER | Facility: HOSPITAL | Age: 63
End: 2023-10-05

## 2023-10-05 NOTE — PROGRESS NOTES
[address] coding opportunities       Chart reviewed, no opportunity found: [address] Review     Patients Insurance     Medicare Insurance: [employer]

## 2023-10-11 DIAGNOSIS — F41.9 ANXIETY: ICD-10-CM

## 2023-10-11 RX ORDER — ALPRAZOLAM 1 MG/1
2 TABLET ORAL 3 TIMES DAILY PRN
Qty: 180 TABLET | Refills: 5 | Status: SHIPPED | OUTPATIENT
Start: 2023-10-11

## 2023-10-12 ENCOUNTER — RA CDI HCC (OUTPATIENT)
Dept: OTHER | Facility: HOSPITAL | Age: 63
End: 2023-10-12

## 2023-10-13 NOTE — PROGRESS NOTES
[address] [facility] coding opportunities       Chart reviewed, no opportunity found: CHART REVIEWED, [facility]     Patients Insurance     Medicare Insurance: [employer]

## 2023-10-16 ENCOUNTER — OFFICE VISIT (OUTPATIENT)
Dept: FAMILY MEDICINE CLINIC | Facility: CLINIC | Age: 63
End: 2023-10-16
Payer: COMMERCIAL

## 2023-10-16 VITALS
RESPIRATION RATE: 16 BRPM | DIASTOLIC BLOOD PRESSURE: 84 MMHG | WEIGHT: 146 LBS | OXYGEN SATURATION: 98 % | TEMPERATURE: 98.6 F | SYSTOLIC BLOOD PRESSURE: 132 MMHG | HEART RATE: 84 BPM | BODY MASS INDEX: 23.57 KG/M2

## 2023-10-16 DIAGNOSIS — K86.1 OTHER CHRONIC PANCREATITIS (HCC): ICD-10-CM

## 2023-10-16 DIAGNOSIS — M25.561 CHRONIC PAIN OF BOTH KNEES: ICD-10-CM

## 2023-10-16 DIAGNOSIS — C34.91 ADENOSQUAMOUS CARCINOMA OF RIGHT LUNG (HCC): ICD-10-CM

## 2023-10-16 DIAGNOSIS — E44.0 MODERATE PROTEIN-CALORIE MALNUTRITION (HCC): ICD-10-CM

## 2023-10-16 DIAGNOSIS — J45.909 UNCOMPLICATED ASTHMA, UNSPECIFIED ASTHMA SEVERITY, UNSPECIFIED WHETHER PERSISTENT: ICD-10-CM

## 2023-10-16 DIAGNOSIS — K21.9 GASTROESOPHAGEAL REFLUX DISEASE, UNSPECIFIED WHETHER ESOPHAGITIS PRESENT: ICD-10-CM

## 2023-10-16 DIAGNOSIS — J43.1 PANLOBULAR EMPHYSEMA (HCC): ICD-10-CM

## 2023-10-16 DIAGNOSIS — F51.04 CHRONIC INSOMNIA: ICD-10-CM

## 2023-10-16 DIAGNOSIS — E78.5 DYSLIPIDEMIA: ICD-10-CM

## 2023-10-16 DIAGNOSIS — M25.562 CHRONIC PAIN OF BOTH KNEES: ICD-10-CM

## 2023-10-16 DIAGNOSIS — F41.9 ANXIETY: ICD-10-CM

## 2023-10-16 DIAGNOSIS — G25.81 RESTLESS LEGS SYNDROME: ICD-10-CM

## 2023-10-16 DIAGNOSIS — M79.7 FIBROMYALGIA: ICD-10-CM

## 2023-10-16 DIAGNOSIS — D64.9 ANEMIA, UNSPECIFIED TYPE: ICD-10-CM

## 2023-10-16 DIAGNOSIS — C34.91 NON-SMALL CELL CANCER OF RIGHT LUNG (HCC): ICD-10-CM

## 2023-10-16 DIAGNOSIS — F43.10 PTSD (POST-TRAUMATIC STRESS DISORDER): ICD-10-CM

## 2023-10-16 DIAGNOSIS — G89.29 CHRONIC PAIN OF BOTH KNEES: ICD-10-CM

## 2023-10-16 DIAGNOSIS — R05.3 CHRONIC COUGH: ICD-10-CM

## 2023-10-16 DIAGNOSIS — F32.2 SEVERE DEPRESSION (HCC): ICD-10-CM

## 2023-10-16 DIAGNOSIS — R91.8 ENDOBRONCHIAL MASS: ICD-10-CM

## 2023-10-16 DIAGNOSIS — Z12.4 SCREENING FOR CERVICAL CANCER: ICD-10-CM

## 2023-10-16 DIAGNOSIS — M51.37 DEGENERATION OF LUMBOSACRAL INTERVERTEBRAL DISC: ICD-10-CM

## 2023-10-16 DIAGNOSIS — Z11.4 SCREENING FOR HIV (HUMAN IMMUNODEFICIENCY VIRUS): Primary | ICD-10-CM

## 2023-10-16 DIAGNOSIS — Z23 ENCOUNTER FOR IMMUNIZATION: ICD-10-CM

## 2023-10-16 PROCEDURE — 99215 OFFICE O/P EST HI 40 MIN: CPT | Performed by: FAMILY MEDICINE

## 2023-10-16 RX ORDER — MELOXICAM 7.5 MG/1
7.5 TABLET ORAL DAILY
Qty: 90 TABLET | Refills: 3 | Status: SHIPPED | OUTPATIENT
Start: 2023-10-16 | End: 2023-10-16 | Stop reason: SDUPTHER

## 2023-10-16 RX ORDER — MELOXICAM 7.5 MG/1
7.5 TABLET ORAL DAILY
Qty: 90 TABLET | Refills: 3 | Status: SHIPPED | OUTPATIENT
Start: 2023-10-16

## 2023-10-16 RX ORDER — PANTOPRAZOLE SODIUM 40 MG/1
40 TABLET, DELAYED RELEASE ORAL DAILY
Qty: 90 TABLET | Refills: 3 | Status: SHIPPED | OUTPATIENT
Start: 2023-10-16 | End: 2023-10-16 | Stop reason: SDUPTHER

## 2023-10-16 RX ORDER — DULOXETIN HYDROCHLORIDE 30 MG/1
30 CAPSULE, DELAYED RELEASE ORAL DAILY
Qty: 90 CAPSULE | Refills: 3 | Status: SHIPPED | OUTPATIENT
Start: 2023-10-16

## 2023-10-16 RX ORDER — PANTOPRAZOLE SODIUM 40 MG/1
40 TABLET, DELAYED RELEASE ORAL DAILY
Qty: 90 TABLET | Refills: 3 | Status: SHIPPED | OUTPATIENT
Start: 2023-10-16

## 2023-10-16 RX ORDER — DULOXETIN HYDROCHLORIDE 30 MG/1
30 CAPSULE, DELAYED RELEASE ORAL DAILY
Qty: 90 CAPSULE | Refills: 3 | Status: SHIPPED | OUTPATIENT
Start: 2023-10-16 | End: 2023-10-16 | Stop reason: SDUPTHER

## 2023-10-16 NOTE — PROGRESS NOTES
Assessment/Plan:    Chronic pancreatitis (HCC)  Stable. Continue current. Gastroesophageal reflux disease  No alarm signs. Continue current. 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 Asthma  Stable. Continue current. COPD with emphysema ([address])  Urged smoking cessation. Non-small cell cancer of right lung ([address])  Plan per heme-onc. Dyslipidemia  LDL at goal.  Continue current. Fibromyalgia  Stable. Continue current. Restless legs syndrome  Symptoms well controlled on current regimen. Anxiety  Stable. Continue current. Severe depression (HCC)  Stable. Continue current. PTSD (post-traumatic stress disorder)  Stable. Continue current. Diagnoses and all orders for this visit:    Screening for HIV (human immunodeficiency virus)    Screening for cervical cancer    Encounter for immunization  -     influenza vaccine, quadrivalent, 0.5 mL, preservative-free, for adult and pediatric patients 6 mos+ (AFLURIA, FLUARIX, FLULAVAL, FLUZONE)    Chronic pain of both knees  -     XR knee 4+ vw right injury; Future  -     XR knee 3 vw left non injury; Future  -     Sedimentation rate, automated; Future  -     C-reactive protein; Future  -     Discontinue: DULoxetine (CYMBALTA) 30 mg delayed release capsule; Take 1 capsule (30 mg total) by mouth daily  -     Discontinue: meloxicam (Mobic) 7.5 mg tablet; Take 1 tablet (7.5 mg total) by mouth daily  -     Diclofenac Sodium (VOLTAREN) 1 %; Apply 4 g topically 4 (four) times a day  -     DULoxetine (CYMBALTA) 30 mg delayed release capsule; Take 1 capsule (30 mg total) by mouth daily  -     meloxicam (Mobic) 7.5 mg tablet; Take 1 tablet (7.5 mg total) by mouth daily    Other chronic pancreatitis ([address])  -     Lipid panel; Future  -     Comprehensive metabolic panel; Future  -     TSH, 3rd generation with Free T4 reflex; Future  -     TSH, 3rd generation with Free T4 reflex;  Future    Gastroesophageal reflux disease, unspecified whether esophagitis present  -     Discontinue: pantoprazole (PROTONIX) 40 mg tablet; Take 1 tablet (40 mg total) by mouth daily  -     pantoprazole (PROTONIX) 40 mg tablet; Take 1 tablet (40 mg total) by mouth daily    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HCC)    Uncomplicated asthma, unspecified asthma severity, unspecified whether persistent    Panlobular emphysema (HCC)    Endobronchial mass    Non-small cell cancer of right lung (HCC)    Degeneration of lumbosacral intervertebral disc  -     Diclofenac Sodium (VOLTAREN) 1 %; Apply 4 g topically 4 (four) times a day    Anemia, unspecified type    Dyslipidemia  -     Lipid panel; Future  -     HEMOGLOBIN A1C W/ EAG ESTIMATION; Future    Fibromyalgia  -     Lipid panel; Future  -     Comprehensive metabolic panel; Future  -     TSH, 3rd generation with Free T4 reflex; Future  -     TSH, 3rd generation with Free T4 reflex; Future  -     HEMOGLOBIN A1C W/ EAG ESTIMATION; Future  -     Sedimentation rate, automated; Future  -     C-reactive protein; Future  -     Diclofenac Sodium (VOLTAREN) 1 %; Apply 4 g topically 4 (four) times a day    Moderate protein-calorie malnutrition (HCC)    Severe depression (HCC)    PTSD (post-traumatic stress disorder)  -     Lipid panel; Future  -     Comprehensive metabolic panel; Future  -     TSH, 3rd generation with Free T4 reflex; Future  -     TSH, 3rd generation with Free T4 reflex; Future  -     HEMOGLOBIN A1C W/ EAG ESTIMATION; Future  -     Sedimentation rate, automated; Future  -     C-reactive protein; Future    Chronic insomnia  -     Lipid panel; Future  -     Comprehensive metabolic panel; Future  -     TSH, 3rd generation with Free T4 reflex; Future  -     TSH, 3rd generation with Free T4 reflex; Future  -     HEMOGLOBIN A1C W/ EAG ESTIMATION; Future  -     Sedimentation rate, automated; Future  -     C-reactive protein; Future    Chronic cough    Anxiety  -     Lipid panel;  Future  -     Comprehensive metabolic panel; Future  -     TSH, 3rd generation with Free T4 reflex; Future  -     TSH, 3rd generation with Free T4 reflex; Future  -     HEMOGLOBIN A1C W/ EAG ESTIMATION; Future  -     Sedimentation rate, automated; Future  -     C-reactive protein; Future    Restless legs syndrome          Subjective:      Patient ID: Wong Angela is a 66 y.o. female. Her BP is at goal.  She has no CP or SOB. She has no HA or vision changes. Her lipids are at goal on her current regimen. She has normal LFTs and no myalgia or muscle weakness. She denies increased cough, wheeze, dyspnea, or sputum production. The following portions of the patient's history were reviewed and updated as appropriate: She  has a past medical history of Acute respiratory failure with hypoxia ([address]) (7/31/2020), Cancer ([address]), Diabetes mellitus ([address]), Irregular heartbeat, and Pancreatitis. 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 Her family history includes COPD in her mother; Cancer in her mother; Diabetes in her maternal grandfather. She  reports that she has been smoking cigarettes. She has a 20.00 pack-year smoking history.  She has never used smokeless tobacco. She reports that she does not drink alcohol and does not use drugs. Current Outpatient Medications   Medication Sig Dispense Refill    ALPRAZolam (XANAX) 1 mg tablet TAKE 2 TABLETS (2 MG TOTAL) BY MOUTH 3 (THREE) TIMES A DAY AS NEEDED FOR ANXIETY 180 tablet 5    atorvastatin (LIPITOR) 40 mg tablet Take 1 tablet (40 mg total) by mouth every evening 90 tablet 3    Diclofenac Sodium (VOLTAREN) 1 % Apply 4 g topically 4 (four) times a day 500 g 5    DULoxetine (CYMBALTA) 30 mg delayed release capsule Take 1 capsule (30 mg total) by mouth daily 90 capsule 3    folic acid (FOLVITE) 1 mg tablet Take by mouth daily      levalbuterol (XOPENEX HFA) 45 mcg/act inhaler Inhale 2 puffs every 4 (four) hours as needed for wheezing or shortness of breath 15 g 5    meloxicam (Mobic) 7.5 mg tablet Take 1 tablet (7.5 mg total) by mouth daily 90 tablet 3    mupirocin (BACTROBAN) 2 % ointment Apply topically 3 (three) times a day 22 g 0    ondansetron (ZOFRAN) 4 mg tablet Take 1 tablet (4 mg total) by mouth every 8 (eight) hours as needed for nausea or vomiting 20 tablet 0    pantoprazole (PROTONIX) 40 mg tablet Take 1 tablet (40 mg total) by mouth daily 90 tablet 3    sertraline (ZOLOFT) 100 mg tablet Take 1 tablet (100 mg total) by mouth daily (Patient taking differently: Take 50 mg by mouth daily) 90 tablet 1    temazepam (RESTORIL) 30 mg capsule Take 1 capsule (30 mg total) by mouth daily at bedtime as needed for sleep 90 capsule 3     No current facility-administered medications for this visit.      Current Outpatient Medications on File Prior to Visit   Medication Sig    ALPRAZolam (XANAX) 1 mg tablet TAKE 2 TABLETS (2 MG TOTAL) BY MOUTH 3 (THREE) TIMES A DAY AS NEEDED FOR ANXIETY    atorvastatin (LIPITOR) 40 mg tablet Take 1 tablet (40 mg total) by mouth every evening    folic acid (FOLVITE) 1 mg tablet Take by mouth daily    levalbuterol (XOPENEX HFA) 45 mcg/act inhaler Inhale 2 puffs every 4 (four) hours as needed for wheezing or shortness of breath    mupirocin (BACTROBAN) 2 % ointment Apply topically 3 (three) times a day    ondansetron (ZOFRAN) 4 mg tablet Take 1 tablet (4 mg total) by mouth every 8 (eight) hours as needed for nausea or vomiting    sertraline (ZOLOFT) 100 mg tablet Take 1 tablet (100 mg total) by mouth daily (Patient taking differently: Take 50 mg by mouth daily)    temazepam (RESTORIL) 30 mg capsule Take 1 capsule (30 mg total) by mouth daily at bedtime as needed for sleep     No current facility-administered medications on file prior to visit. She is allergic to contrast [iodinated contrast media], sulfa antibiotics, cisco flavor - food allergy, antihistamine & nasal deconges [fexofenadine-pseudoephed er], codeine, lisinopril, mangifera indica, nsaids, other, poison ivy extract, naproxen, and prednisone. .    Review of Systems   All other systems reviewed and are negative. Objective:      /84   Pulse 84   Temp 98.6 °F (37 °C) (Tympanic)   Resp 16   Wt 66.2 kg (146 lb)   SpO2 98%   BMI 23.57 kg/m²          Physical Exam  Vitals and nursing note reviewed. Constitutional:       Appearance: Normal appearance. She is normal weight. Cardiovascular:      Rate and Rhythm: Normal rate and regular rhythm. Pulses: Normal pulses. Heart sounds: Normal heart sounds. Pulmonary:      Effort: Pulmonary effort is normal.      Breath sounds: Normal breath sounds. Abdominal:      General: Abdomen is flat. Bowel sounds are normal.      Palpations: Abdomen is soft. Musculoskeletal:         General: Normal range of motion. Cervical back: Normal range of motion and neck supple. Skin:     General: Skin is warm and dry. Capillary Refill: Capillary refill takes less than 2 seconds. Neurological:      General: No focal deficit present. Mental Status: She is alert and oriented to person, place, and time. Mental status is at baseline.    Psychiatric:         Mood and Affect: Mood normal.         Behavior: Behavior normal.         Thought Content:  Thought content normal.         Judgment: Judgment normal.

## 2023-11-06 DIAGNOSIS — F41.9 ANXIETY: ICD-10-CM

## 2023-11-06 DIAGNOSIS — F51.01 PRIMARY INSOMNIA: ICD-10-CM

## 2023-11-06 NOTE — TELEPHONE ENCOUNTER
Requested medication(s) are due for refill today: Yes  Patient has already received a courtesy refill: No  Other reason request has been forwarded to provider: Consent (Marginal Mandibular)/Introductory Paragraph: The rationale for Mohs was explained to the patient and consent was obtained. The risks, benefits and alternatives to therapy were discussed in detail. Specifically, the risks of damage to the marginal mandibular branch of the facial nerve, infection, scarring, bleeding, prolonged wound healing, incomplete removal, allergy to anesthesia, and recurrence were addressed. Prior to the procedure, the treatment site was clearly identified and confirmed by the patient. All components of Universal Protocol/PAUSE Rule completed.

## 2023-11-07 RX ORDER — TEMAZEPAM 30 MG/1
30 CAPSULE ORAL
Qty: 90 CAPSULE | Refills: 0 | Status: SHIPPED | OUTPATIENT
Start: 2023-11-07

## 2023-11-07 RX ORDER — ALPRAZOLAM 1 MG/1
2 TABLET ORAL 3 TIMES DAILY PRN
Qty: 180 TABLET | Refills: 0 | Status: SHIPPED | OUTPATIENT
Start: 2023-11-07

## 2023-11-13 DIAGNOSIS — F41.8 DEPRESSION WITH ANXIETY: ICD-10-CM

## 2023-11-13 DIAGNOSIS — Z12.31 SCREENING MAMMOGRAM FOR BREAST CANCER: Primary | ICD-10-CM

## 2023-11-13 RX ORDER — SERTRALINE HYDROCHLORIDE 100 MG/1
50 TABLET, FILM COATED ORAL DAILY
Qty: 90 TABLET | Refills: 3 | Status: SHIPPED | OUTPATIENT
Start: 2023-11-13

## 2023-12-01 ENCOUNTER — TELEPHONE (OUTPATIENT)
Dept: FAMILY MEDICINE CLINIC | Facility: CLINIC | Age: 63
End: 2023-12-01

## 2023-12-01 DIAGNOSIS — R69 SICK: Primary | ICD-10-CM

## 2023-12-01 RX ORDER — AMOXICILLIN 500 MG/1
500 CAPSULE ORAL EVERY 8 HOURS SCHEDULED
Qty: 21 CAPSULE | Refills: 0 | Status: SHIPPED | OUTPATIENT
Start: 2023-12-01 | End: 2023-12-08

## 2023-12-01 NOTE — TELEPHONE ENCOUNTER
Pt asking if you can send in amoxicillin in to [facility] for her as she has an abscess on her one tooth.  States that she cannot find a dentist with her insurance and she needs a oral surgeon to actually cut it out but still can't find one

## 2023-12-06 DIAGNOSIS — F41.9 ANXIETY: ICD-10-CM

## 2023-12-06 DIAGNOSIS — F51.01 PRIMARY INSOMNIA: ICD-10-CM

## 2023-12-06 RX ORDER — TEMAZEPAM 30 MG/1
30 CAPSULE ORAL
Qty: 90 CAPSULE | Refills: 0 | Status: SHIPPED | OUTPATIENT
Start: 2023-12-06

## 2023-12-06 RX ORDER — ALPRAZOLAM 1 MG/1
2 TABLET ORAL 3 TIMES DAILY PRN
Qty: 180 TABLET | Refills: 0 | Status: SHIPPED | OUTPATIENT
Start: 2023-12-06

## 2024-01-02 DIAGNOSIS — J43.1 PANLOBULAR EMPHYSEMA (HCC): ICD-10-CM

## 2024-01-02 RX ORDER — LEVALBUTEROL TARTRATE 45 UG/1
2 AEROSOL, METERED ORAL EVERY 4 HOURS PRN
Qty: 15 G | Refills: 5 | Status: SHIPPED | OUTPATIENT
Start: 2024-01-02

## 2024-01-08 DIAGNOSIS — F51.01 PRIMARY INSOMNIA: ICD-10-CM

## 2024-01-08 DIAGNOSIS — F41.9 ANXIETY: ICD-10-CM

## 2024-01-08 RX ORDER — TEMAZEPAM 30 MG/1
30 CAPSULE ORAL
Qty: 90 CAPSULE | Refills: 0 | Status: SHIPPED | OUTPATIENT
Start: 2024-01-08

## 2024-01-08 RX ORDER — ALPRAZOLAM 1 MG/1
2 TABLET ORAL 3 TIMES DAILY PRN
Qty: 180 TABLET | Refills: 0 | Status: SHIPPED | OUTPATIENT
Start: 2024-01-08

## 2024-01-23 ENCOUNTER — RA CDI HCC (OUTPATIENT)
Dept: OTHER | Facility: HOSPITAL | Age: 64
End: 2024-01-23

## 2024-02-01 ENCOUNTER — RA CDI HCC (OUTPATIENT)
Dept: OTHER | Facility: HOSPITAL | Age: 64
End: 2024-02-01

## 2024-02-06 LAB — HBA1C MFR BLD HPLC: 5.5 %

## 2024-02-08 ENCOUNTER — TELEPHONE (OUTPATIENT)
Dept: FAMILY MEDICINE CLINIC | Facility: CLINIC | Age: 64
End: 2024-02-08

## 2024-02-08 DIAGNOSIS — F41.9 ANXIETY: ICD-10-CM

## 2024-02-08 DIAGNOSIS — F51.01 PRIMARY INSOMNIA: ICD-10-CM

## 2024-02-08 RX ORDER — ALPRAZOLAM 1 MG/1
2 TABLET ORAL 3 TIMES DAILY PRN
Qty: 180 TABLET | Refills: 0 | Status: SHIPPED | OUTPATIENT
Start: 2024-02-08

## 2024-02-08 RX ORDER — TEMAZEPAM 30 MG/1
30 CAPSULE ORAL
Qty: 90 CAPSULE | Refills: 0 | Status: SHIPPED | OUTPATIENT
Start: 2024-02-08

## 2024-02-08 NOTE — TELEPHONE ENCOUNTER
Patient called in to refill her XANAX and Temazepam to [facility] on file. Please advise. Thank you.

## 2024-02-08 NOTE — TELEPHONE ENCOUNTER
Pt called back to see if there was any update with her medication refill request as she received a mychart messgae stating prescriptions were approved and sent to pharmacy. She advised no script was at her pharmacy when she arrived. Pt would just like update once script is sent.  [phone].

## 2024-02-16 DIAGNOSIS — J43.1 PANLOBULAR EMPHYSEMA (HCC): ICD-10-CM

## 2024-02-16 RX ORDER — LEVALBUTEROL TARTRATE 45 UG/1
2 AEROSOL, METERED ORAL EVERY 4 HOURS PRN
Qty: 45 G | Refills: 1 | Status: SHIPPED | OUTPATIENT
Start: 2024-02-16

## 2024-02-16 NOTE — TELEPHONE ENCOUNTER
Reason for call:   [x] Refill   [] Prior Auth  [x] Other: Pt has been all over the place trying to find this. [facility] does have it for her and needs a script for 90 day supply. Making HP so they can mail this out ASAP so pt is not without a rescue inhaler.    Office:   [x] PCP/Provider - Abbie ORANTES   [] Specialty/Provider -     Medication: Xopenex HFA     Dose/Frequency: 45mcg/ACT - 2 puffs every 4 hours PRN     Quantity: 45    Pharmacy: [facility]     Does the patient have enough for 3 days?   [] Yes   [x] No - Send as HP to POD

## 2024-03-01 DIAGNOSIS — E78.2 MIXED HYPERLIPIDEMIA: ICD-10-CM

## 2024-03-01 RX ORDER — ATORVASTATIN CALCIUM 40 MG/1
40 TABLET, FILM COATED ORAL EVERY EVENING
Qty: 90 TABLET | Refills: 1 | Status: SHIPPED | OUTPATIENT
Start: 2024-03-01 | End: 2024-08-28

## 2024-03-01 NOTE — TELEPHONE ENCOUNTER
Reason for call:   [x] Refill   [] Prior Auth  [] Other:     Office:   [x] PCP/Provider - Dr Leiva  [] Specialty/Provider -     Medication:   Atorvastatin 40 mg, 1 qd    Pharmacy:   [facility]    Does the patient have enough for 3 days?   [x] Yes   [] No - Send as HP to POD    Patient called requesting refill for levalbuterol . Patient made aware medication was refilled on 2/16/24 as request by patient for 45g with 1 refills to [facility]. Patient instructed to contact the pharmacy to obtain refills of medication. Patient verbalized understanding.

## 2024-03-06 DIAGNOSIS — F41.9 ANXIETY: ICD-10-CM

## 2024-03-06 DIAGNOSIS — F51.01 PRIMARY INSOMNIA: ICD-10-CM

## 2024-03-06 NOTE — TELEPHONE ENCOUNTER
Reason for call:   [x] Refill   [] Prior Auth  [] Other:     Office:   [x] PCP/Provider -   [] Specialty/Provider -     Medication:   ALPRAZolam (XANAX) 1 mg tablet  Take 2 tablets (2 mg total) by mouth 3 (three) times a day as needed for anxiety  #180    temazepam (RESTORIL) 30 mg capsule   Take 1 capsule (30 mg total) by mouth daily at bedtime as needed for sleep #90      Pharmacy: [facility] [address] [phone]    Does the patient have enough for 3 days?   [] Yes   [x] No - Send as HP to POD

## 2024-03-07 RX ORDER — TEMAZEPAM 30 MG/1
30 CAPSULE ORAL
Qty: 90 CAPSULE | Refills: 0 | Status: SHIPPED | OUTPATIENT
Start: 2024-03-07

## 2024-03-07 RX ORDER — ALPRAZOLAM 1 MG/1
2 TABLET ORAL 3 TIMES DAILY PRN
Qty: 180 TABLET | Refills: 0 | Status: SHIPPED | OUTPATIENT
Start: 2024-03-07

## 2024-03-13 DIAGNOSIS — J43.1 PANLOBULAR EMPHYSEMA (HCC): ICD-10-CM

## 2024-03-13 RX ORDER — LEVALBUTEROL TARTRATE 45 UG/1
2 AEROSOL, METERED ORAL EVERY 4 HOURS PRN
Qty: 90 G | Refills: 1 | Status: SHIPPED | OUTPATIENT
Start: 2024-03-13

## 2024-03-13 NOTE — TELEPHONE ENCOUNTER
Reason for call: wrong amount sent in last script  [x] Refill   [] Prior Auth  [] Other:     Office:   [x] PCP/Provider -   [] Specialty/Provider -     Medication: levalbuterol 45 mcg inhaler    Quantity: 90g     Pharmacy: [facility]     Does the patient have enough for 3 days?   [x] Yes   [] No - Send as HP to POD

## 2024-04-01 DIAGNOSIS — F41.9 ANXIETY: ICD-10-CM

## 2024-04-01 DIAGNOSIS — F51.01 PRIMARY INSOMNIA: ICD-10-CM

## 2024-04-01 NOTE — TELEPHONE ENCOUNTER
Reason for call:   [x] Refill   [] Prior Auth  [] Other:     Office:   [x] PCP/Provider -   [] Specialty/Provider -     Medication:   ALPRAZolam (XANAX) 1 mg tablet   TAKE 2 TABLETS (2 MG TOTAL) BY MOUTH 3 (THREE) TIMES A DAY AS NEEDED FOR ANXIETY #180    temazepam (RESTORIL) 30 mg capsule    Take 1 capsule (30 mg total) by mouth daily at bedtime as needed for sleep #90      Pharmacy: [facility] [address] [phone]    Does the patient have enough for 3 days?   [x] Yes   [] No - Send as HP to POD

## 2024-04-02 DIAGNOSIS — J43.1 PANLOBULAR EMPHYSEMA (HCC): ICD-10-CM

## 2024-04-02 RX ORDER — TEMAZEPAM 30 MG/1
30 CAPSULE ORAL
Qty: 90 CAPSULE | Refills: 0 | Status: SHIPPED | OUTPATIENT
Start: 2024-04-02

## 2024-04-02 RX ORDER — ALPRAZOLAM 1 MG/1
2 TABLET ORAL 3 TIMES DAILY PRN
Qty: 180 TABLET | Refills: 0 | Status: SHIPPED | OUTPATIENT
Start: 2024-04-02

## 2024-04-03 RX ORDER — LEVALBUTEROL TARTRATE 45 UG/1
2 AEROSOL, METERED ORAL EVERY 4 HOURS PRN
Qty: 45 G | Refills: 1 | Status: SHIPPED | OUTPATIENT
Start: 2024-04-03

## 2024-04-24 ENCOUNTER — VBI (OUTPATIENT)
Dept: ADMINISTRATIVE | Facility: OTHER | Age: 64
End: 2024-04-24

## 2024-05-01 ENCOUNTER — RA CDI HCC (OUTPATIENT)
Dept: OTHER | Facility: HOSPITAL | Age: 64
End: 2024-05-01

## 2024-05-03 ENCOUNTER — RA CDI HCC (OUTPATIENT)
Dept: OTHER | Facility: HOSPITAL | Age: 64
End: 2024-05-03

## 2024-05-06 DIAGNOSIS — F41.9 ANXIETY: ICD-10-CM

## 2024-05-06 DIAGNOSIS — F51.01 PRIMARY INSOMNIA: ICD-10-CM

## 2024-05-06 RX ORDER — ALPRAZOLAM 1 MG/1
2 TABLET ORAL 3 TIMES DAILY PRN
Qty: 180 TABLET | Refills: 0 | Status: SHIPPED | OUTPATIENT
Start: 2024-05-06 | End: 2024-05-13

## 2024-05-06 RX ORDER — TEMAZEPAM 30 MG/1
30 CAPSULE ORAL
Qty: 90 CAPSULE | Refills: 0 | Status: SHIPPED | OUTPATIENT
Start: 2024-05-06

## 2024-05-13 ENCOUNTER — OFFICE VISIT (OUTPATIENT)
Dept: FAMILY MEDICINE CLINIC | Facility: CLINIC | Age: 64
End: 2024-05-13
Payer: COMMERCIAL

## 2024-05-13 VITALS
TEMPERATURE: 98 F | DIASTOLIC BLOOD PRESSURE: 78 MMHG | HEIGHT: 66 IN | OXYGEN SATURATION: 99 % | WEIGHT: 137 LBS | SYSTOLIC BLOOD PRESSURE: 110 MMHG | BODY MASS INDEX: 22.02 KG/M2 | HEART RATE: 81 BPM

## 2024-05-13 DIAGNOSIS — F51.01 PRIMARY INSOMNIA: ICD-10-CM

## 2024-05-13 DIAGNOSIS — F32.2 SEVERE DEPRESSION (HCC): ICD-10-CM

## 2024-05-13 DIAGNOSIS — E78.2 MIXED HYPERLIPIDEMIA: Primary | ICD-10-CM

## 2024-05-13 DIAGNOSIS — J43.1 PANLOBULAR EMPHYSEMA (HCC): ICD-10-CM

## 2024-05-13 DIAGNOSIS — E44.0 MODERATE PROTEIN-CALORIE MALNUTRITION (HCC): ICD-10-CM

## 2024-05-13 DIAGNOSIS — Z12.4 SCREENING FOR CERVICAL CANCER: ICD-10-CM

## 2024-05-13 DIAGNOSIS — Z29.11 NEED FOR RSV IMMUNIZATION: ICD-10-CM

## 2024-05-13 DIAGNOSIS — Z11.4 SCREENING FOR HIV (HUMAN IMMUNODEFICIENCY VIRUS): ICD-10-CM

## 2024-05-13 DIAGNOSIS — Z12.31 ENCOUNTER FOR SCREENING MAMMOGRAM FOR BREAST CANCER: ICD-10-CM

## 2024-05-13 DIAGNOSIS — F41.9 ANXIETY: ICD-10-CM

## 2024-05-13 DIAGNOSIS — K86.1 OTHER CHRONIC PANCREATITIS (HCC): ICD-10-CM

## 2024-05-13 PROBLEM — Z85.118 HISTORY OF ADENOCARCINOMA OF LUNG: Status: ACTIVE | Noted: 2019-01-22

## 2024-05-13 PROBLEM — Z85.118 HISTORY OF ADENOCARCINOMA OF LUNG: Status: ACTIVE | Noted: 2024-05-13

## 2024-05-13 PROCEDURE — 99214 OFFICE O/P EST MOD 30 MIN: CPT | Performed by: FAMILY MEDICINE

## 2024-05-13 PROCEDURE — G2211 COMPLEX E/M VISIT ADD ON: HCPCS | Performed by: FAMILY MEDICINE

## 2024-05-13 RX ORDER — ALPRAZOLAM 1 MG/1
2 TABLET ORAL 3 TIMES DAILY PRN
Qty: 180 TABLET | Refills: 0 | Status: SHIPPED | OUTPATIENT
Start: 2024-05-13

## 2024-05-13 RX ORDER — ATORVASTATIN CALCIUM 40 MG/1
40 TABLET, FILM COATED ORAL EVERY EVENING
Qty: 90 TABLET | Refills: 1 | Status: SHIPPED | OUTPATIENT
Start: 2024-05-13 | End: 2024-11-09

## 2024-05-13 RX ORDER — DOXEPIN HYDROCHLORIDE 10 MG/1
10 CAPSULE ORAL
Qty: 90 CAPSULE | Refills: 3 | Status: SHIPPED | OUTPATIENT
Start: 2024-05-13

## 2024-05-13 RX ORDER — LEVALBUTEROL TARTRATE 45 UG/1
2 AEROSOL, METERED ORAL EVERY 4 HOURS PRN
Qty: 90 G | Refills: 3 | Status: SHIPPED | OUTPATIENT
Start: 2024-05-13

## 2024-05-13 NOTE — PROGRESS NOTES
Assessment/Plan:    No problem-specific Assessment & Plan notes found for this encounter.       Diagnoses and all orders for this visit:    Mixed hyperlipidemia  -     atorvastatin (LIPITOR) 40 mg tablet; Take 1 tablet (40 mg total) by mouth every evening    Screening for HIV (human immunodeficiency virus)    Screening for cervical cancer    Encounter for screening mammogram for breast cancer    Anxiety  -     ALPRAZolam (XANAX) 1 mg tablet; Take 2 tablets (2 mg total) by mouth 3 (three) times a day as needed for anxiety    Panlobular emphysema (HCC)  -     levalbuterol (XOPENEX HFA) 45 mcg/act inhaler; Inhale 2 puffs every 4 (four) hours as needed for wheezing or shortness of breath    Other chronic pancreatitis (HCC)    Severe depression (HCC)    Primary insomnia  -     doxepin (SINEquan) 10 mg capsule; Take 1 capsule (10 mg total) by mouth daily at bedtime    Need for RSV immunization  -     RSV Pre-Fusion F A&B Vac Rcmb (Abrysvo) SOLR vaccine; Inject 0.5 mL into a muscle once for 1 dose    Moderate protein-calorie malnutrition (HCC)          Subjective:      Patient ID: Sarita France is a 64 y.o. female.    She has no increased SOB, cough, or sputum production,  She is not wheezing.    She continues to struggle with anxiety.  She has no HI/SI.    She has no abdominal pain.        The following portions of the patient's history were reviewed and updated as appropriate: She  has a past medical history of Acute respiratory failure with hypoxia (HCC) (7/31/2020), Cancer (HCC), Diabetes mellitus (HCC), Irregular heartbeat, and Pancreatitis.  She   Patient Active Problem List    Diagnosis Date Noted    Severe depression (HCC) 10/01/2020    PTSD (post-traumatic stress disorder) 10/01/2020    Chronic cough 06/09/2020    Chronic insomnia 06/09/2020    Anxiety 02/15/2019    Protein calorie malnutrition (HCC) 02/15/2019    History of adenocarcinoma of lung 01/22/2019    Dyslipidemia 01/15/2019    Endobronchial mass  01/15/2019    Anemia 08/28/2012    Chronic pancreatitis (HCC) 08/28/2012    Fibromyalgia 08/28/2012    Gastroesophageal reflux disease 08/28/2012    Restless legs syndrome 08/28/2012    Asthma 05/07/2012    Degeneration of lumbosacral intervertebral disc 07/07/2011    COPD with emphysema (HCC) 05/29/2007     She  has a past surgical history that includes Cholecystectomy and Bronchoscopy.  Her family history includes COPD in her mother; Cancer in her mother; Diabetes in her maternal grandfather.  She  reports that she has been smoking cigarettes. She started smoking about 45 years ago. She has a 20.7 pack-year smoking history. She has never used smokeless tobacco. She reports that she does not drink alcohol and does not use drugs.  Current Outpatient Medications   Medication Sig Dispense Refill    ALPRAZolam (XANAX) 1 mg tablet Take 2 tablets (2 mg total) by mouth 3 (three) times a day as needed for anxiety 180 tablet 0    atorvastatin (LIPITOR) 40 mg tablet Take 1 tablet (40 mg total) by mouth every evening 90 tablet 1    doxepin (SINEquan) 10 mg capsule Take 1 capsule (10 mg total) by mouth daily at bedtime 90 capsule 3    folic acid (FOLVITE) 1 mg tablet Take by mouth daily      levalbuterol (XOPENEX HFA) 45 mcg/act inhaler Inhale 2 puffs every 4 (four) hours as needed for wheezing or shortness of breath 90 g 3    RSV Pre-Fusion F A&B Vac Rcmb (Abrysvo) SOLR vaccine Inject 0.5 mL into a muscle once for 1 dose 1 each 0    sertraline (ZOLOFT) 100 mg tablet Take 0.5 tablets (50 mg total) by mouth daily 90 tablet 3    temazepam (RESTORIL) 30 mg capsule Take 1 capsule (30 mg total) by mouth daily at bedtime as needed for sleep 90 capsule 0     No current facility-administered medications for this visit.     Current Outpatient Medications on File Prior to Visit   Medication Sig    folic acid (FOLVITE) 1 mg tablet Take by mouth daily    sertraline (ZOLOFT) 100 mg tablet Take 0.5 tablets (50 mg total) by mouth daily     "temazepam (RESTORIL) 30 mg capsule Take 1 capsule (30 mg total) by mouth daily at bedtime as needed for sleep    [DISCONTINUED] ALPRAZolam (XANAX) 1 mg tablet Take 2 tablets (2 mg total) by mouth 3 (three) times a day as needed for anxiety    [DISCONTINUED] atorvastatin (LIPITOR) 40 mg tablet Take 1 tablet (40 mg total) by mouth every evening    [DISCONTINUED] levalbuterol (XOPENEX HFA) 45 mcg/act inhaler Inhale 2 puffs every 4 (four) hours as needed for wheezing or shortness of breath    [DISCONTINUED] ALPRAZolam (XANAX) 1 mg tablet Take 2 tablets (2 mg total) by mouth 3 (three) times a day as needed for anxiety (Patient not taking: Reported on 5/13/2024)    [DISCONTINUED] Diclofenac Sodium (VOLTAREN) 1 % Apply 4 g topically 4 (four) times a day    [DISCONTINUED] DULoxetine (CYMBALTA) 30 mg delayed release capsule Take 1 capsule (30 mg total) by mouth daily    [DISCONTINUED] meloxicam (Mobic) 7.5 mg tablet Take 1 tablet (7.5 mg total) by mouth daily    [DISCONTINUED] mupirocin (BACTROBAN) 2 % ointment Apply topically 3 (three) times a day (Patient not taking: Reported on 5/13/2024)    [DISCONTINUED] ondansetron (ZOFRAN) 4 mg tablet Take 1 tablet (4 mg total) by mouth every 8 (eight) hours as needed for nausea or vomiting    [DISCONTINUED] pantoprazole (PROTONIX) 40 mg tablet Take 1 tablet (40 mg total) by mouth daily     No current facility-administered medications on file prior to visit.     She is allergic to contrast [iodinated contrast media], sulfa antibiotics, cisco flavor - food allergy, antihistamine & nasal deconges [fexofenadine-pseudoephed er], codeine, lisinopril, mangifera indica, nsaids, other, poison ivy extract, naproxen, and prednisone..    Review of Systems   All other systems reviewed and are negative.        Objective:      /78 (BP Location: Left arm, Patient Position: Sitting)   Pulse 81   Temp 98 °F (36.7 °C) (Tympanic)   Ht 5' 6\" (1.676 m)   Wt 62.1 kg (137 lb)   SpO2 99%   BMI " 22.11 kg/m²          Physical Exam  Vitals and nursing note reviewed.   Constitutional:       Appearance: Normal appearance. She is normal weight.   Cardiovascular:      Rate and Rhythm: Normal rate and regular rhythm.      Pulses: Normal pulses.      Heart sounds: Normal heart sounds.   Pulmonary:      Effort: Pulmonary effort is normal.      Breath sounds: Normal breath sounds.   Abdominal:      General: Abdomen is flat. Bowel sounds are normal.      Palpations: Abdomen is soft.   Musculoskeletal:         General: Normal range of motion.      Cervical back: Normal range of motion and neck supple.   Skin:     General: Skin is warm and dry.      Capillary Refill: Capillary refill takes less than 2 seconds.   Neurological:      General: No focal deficit present.      Mental Status: She is alert and oriented to person, place, and time. Mental status is at baseline.   Psychiatric:         Mood and Affect: Mood normal.         Behavior: Behavior normal.         Thought Content: Thought content normal.         Judgment: Judgment normal.

## 2024-06-07 ENCOUNTER — TELEPHONE (OUTPATIENT)
Age: 64
End: 2024-06-07

## 2024-06-07 DIAGNOSIS — K04.7 DENTAL INFECTION: Primary | ICD-10-CM

## 2024-06-07 RX ORDER — AMOXICILLIN 500 MG/1
500 CAPSULE ORAL EVERY 8 HOURS SCHEDULED
Qty: 21 CAPSULE | Refills: 0 | Status: SHIPPED | OUTPATIENT
Start: 2024-06-07 | End: 2024-06-14

## 2024-06-07 NOTE — TELEPHONE ENCOUNTER
Patient has an abscess on her R  lower molar. Patient reports that she has swelling, pain on R side of the face. Pain from eye to her jaw. Denies redness. Patient has had this in the past. Patient was treated with amoxacillin 500 mg capsules 1 capsule by mouth every 8 hours x 7 days. Patient states that she does not have a dentist to call. Patient is asking if we can call in a prescription to [facility] in [place]. Please call the patient at [phone]

## 2024-07-05 ENCOUNTER — TELEPHONE (OUTPATIENT)
Dept: FAMILY MEDICINE CLINIC | Facility: CLINIC | Age: 64
End: 2024-07-05

## 2024-07-05 DIAGNOSIS — F41.9 ANXIETY: ICD-10-CM

## 2024-07-05 DIAGNOSIS — F51.01 PRIMARY INSOMNIA: ICD-10-CM

## 2024-07-05 RX ORDER — ALPRAZOLAM 1 MG/1
2 TABLET ORAL 3 TIMES DAILY PRN
Qty: 180 TABLET | Refills: 0 | Status: SHIPPED | OUTPATIENT
Start: 2024-07-05

## 2024-07-05 RX ORDER — TEMAZEPAM 30 MG/1
30 CAPSULE ORAL
Qty: 90 CAPSULE | Refills: 0 | Status: SHIPPED | OUTPATIENT
Start: 2024-07-05

## 2024-07-05 NOTE — TELEPHONE ENCOUNTER
Spoke to pt and  let her know that her & her  will be unable to see [facility] on the same day as their scheduled appt with Dr. Leiva.  I told her [facility] is only here on Mondays & she is booked out until November/December.  I told her Dr. Leiva is moving to [place], if they would like to stay with him.  I gave her the [place] office phone # & she said she will think about it and let us know.

## 2024-07-05 NOTE — TELEPHONE ENCOUNTER
Last visit 05/13/2024  Next appt 09/30/2024    Patient stated that she is choosing [facility] as her PCP. Patient is asking will she be visiting with her new PCP on her next appointment, since the appointment is still under Dr Leiva. Patient was also inquiring about her two scripts that are pending for alprazolam (xanax) and temazepam (restoril). Patient explained that she needs the medication to be filled today, because they will be closed in the weekend. Please advise.

## 2024-08-04 DIAGNOSIS — F51.01 PRIMARY INSOMNIA: ICD-10-CM

## 2024-08-04 DIAGNOSIS — F41.9 ANXIETY: ICD-10-CM

## 2024-08-04 RX ORDER — TEMAZEPAM 30 MG/1
30 CAPSULE ORAL
Qty: 90 CAPSULE | Refills: 0 | Status: SHIPPED | OUTPATIENT
Start: 2024-08-04

## 2024-08-04 RX ORDER — ALPRAZOLAM 1 MG/1
2 TABLET ORAL 3 TIMES DAILY PRN
Qty: 180 TABLET | Refills: 0 | Status: SHIPPED | OUTPATIENT
Start: 2024-08-04

## 2024-08-05 ENCOUNTER — TELEPHONE (OUTPATIENT)
Dept: OTHER | Facility: HOSPITAL | Age: 64
End: 2024-08-05

## 2024-08-05 NOTE — TELEPHONE ENCOUNTER
Patient calling to follow up on refill request as she has not heard from pharmacy. Reviewed chart and made patient aware that her medications were signed off on yesterday evening and advised her to reach out to the pharmacy.

## 2024-08-29 DIAGNOSIS — F41.9 ANXIETY: ICD-10-CM

## 2024-08-29 DIAGNOSIS — J43.1 PANLOBULAR EMPHYSEMA (HCC): ICD-10-CM

## 2024-08-29 DIAGNOSIS — F51.01 PRIMARY INSOMNIA: ICD-10-CM

## 2024-08-29 RX ORDER — TEMAZEPAM 30 MG
30 CAPSULE ORAL
Qty: 30 CAPSULE | Refills: 0 | Status: SHIPPED | OUTPATIENT
Start: 2024-08-29

## 2024-08-29 RX ORDER — LEVALBUTEROL TARTRATE 45 UG/1
2 AEROSOL, METERED ORAL EVERY 4 HOURS PRN
Qty: 90 G | Refills: 0 | Status: SHIPPED | OUTPATIENT
Start: 2024-08-29

## 2024-08-29 RX ORDER — ALPRAZOLAM 1 MG
2 TABLET ORAL 3 TIMES DAILY PRN
Qty: 180 TABLET | Refills: 0 | Status: SHIPPED | OUTPATIENT
Start: 2024-08-29

## 2024-08-29 NOTE — TELEPHONE ENCOUNTER
Reason for call:   [x] Refill   [] Prior Auth  [] Other:     Office:   [x] PCP/Provider - Ludwin Leiva MD   [] Specialty/Provider -     Medication:     Does the patient have enough for 3 days?   [] Yes   [x] No - Send as HP to POD

## 2024-09-24 ENCOUNTER — VBI (OUTPATIENT)
Dept: ADMINISTRATIVE | Facility: OTHER | Age: 64
End: 2024-09-24

## 2024-09-24 NOTE — TELEPHONE ENCOUNTER
09/24/24 9:58 AM     Chart reviewed for Mammogram was/were not submitted to the patient's insurance.     Aliya Borja MA   [facility]

## 2024-09-30 DIAGNOSIS — F51.01 PRIMARY INSOMNIA: ICD-10-CM

## 2024-09-30 DIAGNOSIS — F41.9 ANXIETY: ICD-10-CM

## 2024-09-30 NOTE — TELEPHONE ENCOUNTER
Reason for call:   [x] Refill   [] Prior Auth  [] Other:     Office:   [x] PCP/Provider - [facility]   [] Specialty/Provider -     ALPRAZolam (XANAX) 1 mg tablet   2 mg, 3 times daily PRN   180    temazepam (RESTORIL) 30 mg capsule    30 mg, Daily at bedtime PRN   30    Pharmacy: [id number]    Does the patient have enough for 3 days?   [] Yes   [x] No - Send as HP to POD

## 2024-10-01 RX ORDER — ALPRAZOLAM 1 MG
2 TABLET ORAL 3 TIMES DAILY PRN
Qty: 180 TABLET | Refills: 0 | Status: SHIPPED | OUTPATIENT
Start: 2024-10-01

## 2024-10-01 RX ORDER — TEMAZEPAM 30 MG
30 CAPSULE ORAL
Qty: 30 CAPSULE | Refills: 0 | Status: SHIPPED | OUTPATIENT
Start: 2024-10-01

## 2024-10-22 ENCOUNTER — VBI (OUTPATIENT)
Dept: ADMINISTRATIVE | Facility: OTHER | Age: 64
End: 2024-10-22

## 2024-10-22 NOTE — TELEPHONE ENCOUNTER
10/22/24 9:45 AM     Chart reviewed for CRC: Colonoscopy was/were not submitted to the patient's insurance.     Aliya Borja MA   [facility]

## 2024-10-28 DIAGNOSIS — F51.01 PRIMARY INSOMNIA: ICD-10-CM

## 2024-10-28 DIAGNOSIS — F41.9 ANXIETY: ICD-10-CM

## 2024-10-28 DIAGNOSIS — J43.1 PANLOBULAR EMPHYSEMA (HCC): ICD-10-CM

## 2024-10-28 NOTE — TELEPHONE ENCOUNTER
Reason for call: Multiple Pharmacies - need levalbuterol ASAP  [x] Refill   [] Prior Auth  [] Other:     Office:   [x] PCP/Provider -   [] Specialty/Provider -     Medication:     Levalbuterol  45 mcg 2 puffs Q4H As Needed - [facility]  Xanax   1 mg TID as needed - [facility]   Temazepam  30 mg daily as needed - [facility]     Dose/Frequency:     Quantity: 90d supply    Pharmacy: Multiple Pharmacies     Does the patient have enough for 3 days?   [x] Yes   [x] No - Send as HP to POD - levelbuterol

## 2024-10-30 RX ORDER — TEMAZEPAM 30 MG/1
30 CAPSULE ORAL
Qty: 30 CAPSULE | Refills: 0 | Status: SHIPPED | OUTPATIENT
Start: 2024-10-30

## 2024-10-30 RX ORDER — ALPRAZOLAM 1 MG/1
2 TABLET ORAL 3 TIMES DAILY PRN
Qty: 180 TABLET | Refills: 0 | Status: SHIPPED | OUTPATIENT
Start: 2024-10-30

## 2024-10-30 RX ORDER — LEVALBUTEROL TARTRATE 45 UG/1
2 AEROSOL, METERED ORAL EVERY 4 HOURS PRN
Qty: 90 G | Refills: 1 | Status: SHIPPED | OUTPATIENT
Start: 2024-10-30

## 2024-11-02 ENCOUNTER — NURSE TRIAGE (OUTPATIENT)
Dept: OTHER | Facility: OTHER | Age: 64
End: 2024-11-02

## 2024-11-02 NOTE — TELEPHONE ENCOUNTER
"Reason for Disposition  • [1] Follow-up call to recent contact AND [2] information only call, no triage required    Answer Assessment - Initial Assessment Questions  1. REASON FOR CALL: \"What is the main reason for your call?\" or \"How can I best help you?\"  Patient reports not having any medication issue at the current time. This was related to the pharmacy.    Protocols used: Information Only Call - No Triage-Adult-    "

## 2024-11-02 NOTE — TELEPHONE ENCOUNTER
"Regarding: med refill  ----- Message from Jorge CAUSEY sent at 11/2/2024 12:15 PM EDT -----  \" When I went to  my meds they did not give me my alprazolam and now they are closed. I cannot go without this medication all weekend, can someone please send in to [facility]?\"    "

## 2024-11-18 DIAGNOSIS — E78.2 MIXED HYPERLIPIDEMIA: ICD-10-CM

## 2024-11-19 RX ORDER — ATORVASTATIN CALCIUM 40 MG/1
40 TABLET, FILM COATED ORAL EVERY EVENING
Qty: 90 TABLET | Refills: 1 | Status: SHIPPED | OUTPATIENT
Start: 2024-11-19 | End: 2025-05-18

## 2024-11-26 DIAGNOSIS — F41.9 ANXIETY: ICD-10-CM

## 2024-11-26 DIAGNOSIS — F51.01 PRIMARY INSOMNIA: ICD-10-CM

## 2024-11-26 RX ORDER — TEMAZEPAM 30 MG/1
30 CAPSULE ORAL
Qty: 30 CAPSULE | Refills: 0 | Status: SHIPPED | OUTPATIENT
Start: 2024-11-26

## 2024-11-26 RX ORDER — ALPRAZOLAM 1 MG/1
2 TABLET ORAL 3 TIMES DAILY PRN
Qty: 180 TABLET | Refills: 0 | Status: SHIPPED | OUTPATIENT
Start: 2024-11-26

## 2024-11-26 NOTE — TELEPHONE ENCOUNTER
Reason for call:   [x] Refill   [] Prior Auth  [x] Other: pharmacy change - [facility]     Office:   [x] PCP/Provider - : Ludwin Leiva /konstantin  [] Specialty/Provider -     Medication:     ALPRAZolam (XANAX) 1 mg tablet       Dose/Frequency: Take 2 tablets (2 mg total) by mouth 3 (three) times a day as needed for anxiety     Quantity: 180    Medication:     temazepam (RESTORIL) 30 mg capsule       Dose/Frequency:  Take 1 capsule (30 mg total) by mouth daily at bedtime as needed for sleep,     Quantity: 30      [id number] [address] [place] [address]      Does the patient have enough for 3 days?   [] Yes   [] No - Send as HP to POD

## 2024-12-06 ENCOUNTER — VBI (OUTPATIENT)
Dept: ADMINISTRATIVE | Facility: OTHER | Age: 64
End: 2024-12-06

## 2024-12-06 NOTE — TELEPHONE ENCOUNTER
12/06/24 11:53 AM     Chart reviewed for CRC: Colonoscopy was/were not submitted to the patient's insurance.     Aliya Borja MA   [facility]

## 2024-12-11 ENCOUNTER — VBI (OUTPATIENT)
Dept: ADMINISTRATIVE | Facility: OTHER | Age: 64
End: 2024-12-11

## 2024-12-11 NOTE — TELEPHONE ENCOUNTER
12/11/24 8:01 AM     Chart reviewed for Mammogram ; nothing is submitted to the patient's insurance at this time.     Rashaun Jacobs MA   [facility]

## 2024-12-18 DIAGNOSIS — J43.1 PANLOBULAR EMPHYSEMA (HCC): ICD-10-CM

## 2024-12-18 NOTE — TELEPHONE ENCOUNTER
Reason for call:   [x] Refill   [] Prior Auth  [] Other:     Office:   [x] PCP/Provider - Ludwin Leiva,   [] Specialty/Provider -     Medication: levalbuterol (XOPENEX HFA) 45 mcg/act inhaler     Dose/Frequency:  Inhale 2 puffs every 4 (four) hours as needed for wheezing or shortness of breath     Quantity: Requesting 90 day supply    Pharmacy: [facility]    Does the patient have enough for 3 days?   [x] Yes   [] No - Send as HP to POD

## 2024-12-20 RX ORDER — LEVALBUTEROL TARTRATE 45 UG/1
2 AEROSOL, METERED ORAL EVERY 4 HOURS PRN
Qty: 90 G | Refills: 0 | Status: SHIPPED | OUTPATIENT
Start: 2024-12-20

## 2024-12-24 DIAGNOSIS — F51.01 PRIMARY INSOMNIA: ICD-10-CM

## 2024-12-24 DIAGNOSIS — F41.9 ANXIETY: ICD-10-CM

## 2024-12-24 NOTE — TELEPHONE ENCOUNTER
Reason for call: Ludwin Leiva, manage these medications! Patient said she has no more refills at the Pharmacy!    [x] Refill   [] Prior Auth  [] Other:     Office:   [x] PCP/Provider -   [] Specialty/Provider -     Medication:     ALPRAZolam (XANAX) 1 mg tablet       Dose/Frequency: Take 2 tablets (2 mg total) by mouth 3 (three) times a day as needed for anxiety     Quantity: 180 tablet     Medication:    temazepam (RESTORIL) 30 mg capsule     Dose/Frequency:Take 1 capsule (30 mg total) by mouth daily at bedtime as needed for sleep     Quantity:30 capsule     Pharmacy: [id number] [address] [phone]     Does the patient have enough for 3 days?   [] Yes   [x] No - Send as HP to POD

## 2024-12-26 DIAGNOSIS — F51.01 PRIMARY INSOMNIA: ICD-10-CM

## 2024-12-26 DIAGNOSIS — F41.9 ANXIETY: ICD-10-CM

## 2024-12-27 NOTE — TELEPHONE ENCOUNTER
Patient called to request a refill for their temazepam (RESTORIL) 30 mg capsule and ALPRAZolam (XANAX) 1 mg tablet  advised a refill was requested on 12/26/2024 and is pending approval. Patient verbalized understanding and is in agreement.

## 2024-12-27 NOTE — TELEPHONE ENCOUNTER
Patient called requesting refill for levalbuterol (XOPENEX HFA) 45 mcg/act inhaler . Patient made aware medication was refilled on 12/20/2024 for 90 g with 0 refills to [facility] . Patient instructed to contact the pharmacy to obtain refills of medication. Patient verbalized understanding.

## 2024-12-30 RX ORDER — ALPRAZOLAM 1 MG/1
2 TABLET ORAL 3 TIMES DAILY PRN
Qty: 180 TABLET | Refills: 0 | Status: SHIPPED | OUTPATIENT
Start: 2024-12-30

## 2024-12-30 RX ORDER — TEMAZEPAM 30 MG/1
30 CAPSULE ORAL
Qty: 30 CAPSULE | Refills: 0 | Status: SHIPPED | OUTPATIENT
Start: 2024-12-30

## 2025-01-14 NOTE — TELEPHONE ENCOUNTER
Requested medication(s) are due for refill today: Yes  Patient has already received a courtesy refill: No  Other reason request has been forwarded to provider:
No
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
## 2025-01-25 DIAGNOSIS — F41.8 DEPRESSION WITH ANXIETY: ICD-10-CM

## 2025-01-27 DIAGNOSIS — F41.9 ANXIETY: ICD-10-CM

## 2025-01-27 DIAGNOSIS — F51.01 PRIMARY INSOMNIA: ICD-10-CM

## 2025-01-27 RX ORDER — ALPRAZOLAM 1 MG/1
2 TABLET ORAL 3 TIMES DAILY PRN
Qty: 180 TABLET | Refills: 0 | Status: CANCELLED | OUTPATIENT
Start: 2025-01-27

## 2025-01-28 DIAGNOSIS — F41.9 ANXIETY: ICD-10-CM

## 2025-01-28 DIAGNOSIS — F51.01 PRIMARY INSOMNIA: ICD-10-CM

## 2025-01-28 DIAGNOSIS — F41.8 DEPRESSION WITH ANXIETY: ICD-10-CM

## 2025-01-28 RX ORDER — SERTRALINE HYDROCHLORIDE 100 MG/1
150 TABLET, FILM COATED ORAL DAILY
Qty: 90 TABLET | Refills: 3 | OUTPATIENT
Start: 2025-01-28

## 2025-01-28 RX ORDER — SERTRALINE HYDROCHLORIDE 100 MG/1
50 TABLET, FILM COATED ORAL DAILY
Qty: 90 TABLET | Refills: 3 | Status: SHIPPED | OUTPATIENT
Start: 2025-01-28

## 2025-01-29 RX ORDER — TEMAZEPAM 30 MG/1
30 CAPSULE ORAL
Qty: 30 CAPSULE | Refills: 0 | OUTPATIENT
Start: 2025-01-29

## 2025-01-29 RX ORDER — ALPRAZOLAM 1 MG/1
2 TABLET ORAL 3 TIMES DAILY PRN
Qty: 180 TABLET | Refills: 0 | Status: SHIPPED | OUTPATIENT
Start: 2025-01-29

## 2025-01-29 RX ORDER — TEMAZEPAM 30 MG/1
30 CAPSULE ORAL
Qty: 30 CAPSULE | Refills: 0 | Status: SHIPPED | OUTPATIENT
Start: 2025-01-29

## 2025-02-21 DIAGNOSIS — F51.01 PRIMARY INSOMNIA: ICD-10-CM

## 2025-02-21 DIAGNOSIS — F41.9 ANXIETY: ICD-10-CM

## 2025-02-21 RX ORDER — TEMAZEPAM 30 MG/1
30 CAPSULE ORAL
Qty: 30 CAPSULE | Refills: 0 | OUTPATIENT
Start: 2025-02-21

## 2025-02-21 RX ORDER — ALPRAZOLAM 1 MG/1
2 TABLET ORAL 3 TIMES DAILY PRN
Qty: 180 TABLET | Refills: 0 | OUTPATIENT
Start: 2025-02-21

## 2025-02-25 ENCOUNTER — VBI (OUTPATIENT)
Dept: ADMINISTRATIVE | Facility: OTHER | Age: 65
End: 2025-02-25

## 2025-02-25 NOTE — TELEPHONE ENCOUNTER
02/25/25 11:06 AM     Chart reviewed for Mammogram was/were not submitted to the patient's insurance.     Aliya Borja MA   [facility]

## 2025-02-26 DIAGNOSIS — J43.1 PANLOBULAR EMPHYSEMA (HCC): ICD-10-CM

## 2025-02-26 DIAGNOSIS — F41.9 ANXIETY: ICD-10-CM

## 2025-02-26 NOTE — TELEPHONE ENCOUNTER
Patient called to request a refill for alprazolam. She was advised a refill was requested on 02/26/25 and is pending approval. Patient verbalized understanding and is in agreement.     Does the patient have enough for 3 days?   [] Yes   [x] No - Send as HP to POD   Rerouting with high priority

## 2025-02-26 NOTE — TELEPHONE ENCOUNTER
Reason for call:   [x] Refill   [] Prior Auth  [] Other:     Office:   [x] PCP/Provider - Mike ORANTES / Abbie    Medication: levalbuterol inhaler    Dose/Frequency: 45mcg; 2 puffs q4 prn    Quantity: 90g    Pharmacy: [facility] [place] - [address] [facility]     Does the patient have enough for 3 days?   [x] Yes   [] No - Send as HP to POD

## 2025-02-27 RX ORDER — LEVALBUTEROL TARTRATE 45 UG/1
2 AEROSOL, METERED ORAL EVERY 4 HOURS PRN
Qty: 90 G | Refills: 1 | OUTPATIENT
Start: 2025-02-27

## 2025-02-27 RX ORDER — ALPRAZOLAM 1 MG/1
TABLET ORAL
Qty: 180 TABLET | Refills: 0 | Status: SHIPPED | OUTPATIENT
Start: 2025-02-27

## 2025-03-17 DIAGNOSIS — J43.1 PANLOBULAR EMPHYSEMA (HCC): ICD-10-CM

## 2025-03-18 RX ORDER — LEVALBUTEROL TARTRATE 45 UG/1
2 AEROSOL, METERED ORAL EVERY 4 HOURS PRN
Qty: 90 G | Refills: 0 | OUTPATIENT
Start: 2025-03-18

## 2025-03-24 DIAGNOSIS — F41.9 ANXIETY: ICD-10-CM

## 2025-03-24 DIAGNOSIS — F51.01 PRIMARY INSOMNIA: ICD-10-CM

## 2025-03-26 ENCOUNTER — VBI (OUTPATIENT)
Dept: ADMINISTRATIVE | Facility: OTHER | Age: 65
End: 2025-03-26

## 2025-03-26 NOTE — TELEPHONE ENCOUNTER
Patient contacted to schedule Annual Wellness Visit.   A message was left for the patient to return the call.    Thank you.  Gillian Anne  [facility]

## 2025-03-26 NOTE — TELEPHONE ENCOUNTER
03/26/25 9:33 AM     Chart reviewed for CRC: Colonoscopy was/were not submitted to the patient's insurance.     Aliya Borja MA   [facility]

## 2025-03-27 RX ORDER — ALPRAZOLAM 1 MG/1
1 TABLET ORAL 3 TIMES DAILY PRN
Qty: 180 TABLET | Refills: 0 | Status: SHIPPED | OUTPATIENT
Start: 2025-03-27

## 2025-03-27 RX ORDER — TEMAZEPAM 30 MG/1
30 CAPSULE ORAL
Qty: 30 CAPSULE | Refills: 0 | Status: SHIPPED | OUTPATIENT
Start: 2025-03-27

## 2025-03-30 DIAGNOSIS — F41.9 ANXIETY: ICD-10-CM

## 2025-03-31 NOTE — TELEPHONE ENCOUNTER
Patient called the RX Refill Line. Message is being forwarded to the office.     Patient state she sent a my chart message about her medication. Says it was written and sent to [facility] incorrectly.     Patient state  alprazolam should be 2 tablets TID, says it has always been 2TID but it was sent to [facility] on 03.27.2025 as 1 tablet TID and stated her  did  the medication and she only got 90 (1TID).    Patient is requesting alprazolam be resent to [facility] on file as 2 tablets TID state she is hoping this is not going to give her any problems.    Patient says  cancer came back and she is really stress because they are talking cutting her lungs to get the cancer out and she didn't think it would come back after 5 year     Please contact patient at [phone] to discuss

## 2025-04-01 RX ORDER — ALPRAZOLAM 1 MG/1
2 TABLET ORAL 3 TIMES DAILY PRN
Qty: 180 TABLET | Refills: 0 | Status: SHIPPED | OUTPATIENT
Start: 2025-04-01

## 2025-04-03 DIAGNOSIS — J43.1 PANLOBULAR EMPHYSEMA (HCC): ICD-10-CM

## 2025-04-04 RX ORDER — LEVALBUTEROL TARTRATE 45 UG/1
2 AEROSOL, METERED ORAL EVERY 4 HOURS PRN
Qty: 90 G | Refills: 0 | Status: SHIPPED | OUTPATIENT
Start: 2025-04-04

## 2025-04-08 NOTE — TELEPHONE ENCOUNTER
In an effort to ensure that our patients LiveWell, a Team Member has reviewed your chart and identified an opportunity to provide the best care possible. An attempt was made to discuss or schedule due or overdue Preventive or Chronic Condition care.Care Gaps identified: Breast Cancer Screening, Colorectal Cancer Screening, Bone Density Screen, and Wellness Visits.    The Outcome was Contact was not made, left message. We are attempting to schedule a yearly wellness visit. If you have any questions or need help with scheduling, contact your primary care provider..   Type of Appointment needed: Medicare Wellness Visit    Medication:  PDMP not reviewed  Active agreement on file -yes    DUPLICATE

## 2025-04-29 DIAGNOSIS — F51.01 PRIMARY INSOMNIA: ICD-10-CM

## 2025-04-29 DIAGNOSIS — F41.9 ANXIETY: ICD-10-CM

## 2025-04-29 RX ORDER — TEMAZEPAM 30 MG/1
30 CAPSULE ORAL
Qty: 30 CAPSULE | Refills: 0 | Status: SHIPPED | OUTPATIENT
Start: 2025-04-29

## 2025-04-29 RX ORDER — ALPRAZOLAM 1 MG/1
1 TABLET ORAL 3 TIMES DAILY PRN
Qty: 180 TABLET | Refills: 0 | Status: SHIPPED | OUTPATIENT
Start: 2025-04-29

## 2025-04-29 NOTE — TELEPHONE ENCOUNTER
States the script for Xanax was messes up and it needs to to be #180      Reason for call:   [x] Refill   [] Prior Auth  [] Other:     Office:   [x] PCP/Provider -  Yeimi Tomlinson  [] Specialty/Provider -     Medication: Restoril    Dose/Frequency: 30 mg     Quantity: #30    Pharmacy: [facility] [address] [place] [address] Pharmacy   Does the patient have enough for 3 days?   [] Yes   [x] No - Send as HP to POD    [facility]   Does the patient have enough for 10 days?   [] Yes   [] No - Send as HP to POD                  Reason for call:   [x] Refill   [] Prior Auth  [] Other:     Office:   [x] PCP/Provider -   [] Specialty/Provider -     Medication: Xanax    Dose/Frequency: 1 mg    Quantity: #180    [facility]armacy: [facility]   Does the patient have enough for 3 days?   [] Yes   [] No - Send as HP to POD    [facility]   Does the patient have enough for 10 days?   [] Yes   [] No - Send as HP to POD

## 2025-05-01 ENCOUNTER — TELEPHONE (OUTPATIENT)
Dept: FAMILY MEDICINE CLINIC | Facility: CLINIC | Age: 65
End: 2025-05-01

## 2025-05-01 NOTE — TELEPHONE ENCOUNTER
Patient called to make an appt for her high BP. She states at her oncology appt she was informed by a nurse to call our office to make a sooner appt. She states it is running around 150/100 pulse 104 over the last few days. Offered appt today with [place] office, she did schedule then called back to cancel. She states that she would rather wait until her appt on 5/23/25. I informed patient to go report to ER as her BP has been running this high, but she declined.

## 2025-05-05 ENCOUNTER — TELEPHONE (OUTPATIENT)
Dept: FAMILY MEDICINE CLINIC | Facility: CLINIC | Age: 65
End: 2025-05-05

## 2025-05-07 DIAGNOSIS — J43.1 PANLOBULAR EMPHYSEMA (HCC): ICD-10-CM

## 2025-05-07 RX ORDER — LEVALBUTEROL TARTRATE 45 UG/1
2 AEROSOL, METERED ORAL EVERY 4 HOURS PRN
Qty: 90 G | Refills: 0 | Status: SHIPPED | OUTPATIENT
Start: 2025-05-07

## 2025-05-07 NOTE — TELEPHONE ENCOUNTER
Reason for call:   [x] Refill   [] Prior Auth  [] Other:     Office:   [x] PCP/Provider -   [] Specialty/Provider -       levalbuterol (XOPENEX HFA) 45 mcg/act inhaler 2 puff, Inhalation, Every 4 hours PRN       Quantity: 90 day supply    Pharmacy: [facility]    Local Pharmacy   Does the patient have enough for 3 days?   [] Yes   [x] No - Send as HP to POD    [facility]   Does the patient have enough for 10 days?   [] Yes   [] No - Send as HP to POD

## 2025-05-09 DIAGNOSIS — J43.1 PANLOBULAR EMPHYSEMA (HCC): ICD-10-CM

## 2025-05-11 DIAGNOSIS — E78.2 MIXED HYPERLIPIDEMIA: ICD-10-CM

## 2025-05-12 DIAGNOSIS — F41.9 ANXIETY: ICD-10-CM

## 2025-05-12 RX ORDER — ALPRAZOLAM 1 MG/1
2 TABLET ORAL 3 TIMES DAILY PRN
Qty: 180 TABLET | Refills: 0 | Status: SHIPPED | OUTPATIENT
Start: 2025-05-12 | End: 2025-05-13

## 2025-05-13 RX ORDER — ALPRAZOLAM 1 MG/1
TABLET ORAL
Qty: 180 TABLET | Refills: 3 | Status: SHIPPED | OUTPATIENT
Start: 2025-05-13

## 2025-05-13 NOTE — TELEPHONE ENCOUNTER
Reason for call: SENT TO WRONG PHARMACY-PLEASE RESEND TO [facility]-PATIENT IS OUT OF MEDICATION  [x] Refill   [] Prior Auth  [] Other:     Office:   [x] PCP/Provider - Ludwin Leiva MD [facility]   [] Specialty/Provider -     Medication: ALPRAZOLAM    Dose/Frequency: 1mg       2 mg, Oral, 3 times daily PRN     Quantity: 180    Pharmacy: [id number]   Does the patient have enough for 3 days?   [] Yes   [x] No - Send as HP to POD    [facility]   Does the patient have enough for 10 days?   [] Yes   [] No - Send as HP to POD

## 2025-05-13 NOTE — TELEPHONE ENCOUNTER
Patient called to request a refill for their alprazolam advised a refill was requested on 5/7/25 and is pending approval. Patient verbalized understanding and is in agreement.     Does the patient have enough for 3 days?   [] Yes   [x] No - Send as HP to POD

## 2025-05-13 NOTE — TELEPHONE ENCOUNTER
Patient called in really upset because she states she was told by Veda that if she calls [facility] they can [facility] [place] and when she called them they stated they cannot do that because it is a controlled medication. So she is mad someone gave her the wrong information.     She states she is out of this medication and is frustrated with all of the calling around she has had to do while trying to deal with her cancer.     Please send prescription to [facility] in [place] ASAP.

## 2025-05-13 NOTE — TELEPHONE ENCOUNTER
Patient called to request a refill for their alprazolam advised a refill was requested on 0510/2025 and is pending approval. Patient verbalized understanding and is in agreement.     Does the patient have enough for 3 days?   [] Yes   [x] No - Send as HP to POD     Script was sent to the wrong pharm and needs to be sent to [facility].  Patient is out of meds and is very upset that the script was sent to the wrong pharmacy and she's had to call 3 times to get it fixed.

## 2025-05-14 RX ORDER — ATORVASTATIN CALCIUM 40 MG/1
40 TABLET, FILM COATED ORAL EVERY EVENING
Qty: 90 TABLET | Refills: 1 | Status: SHIPPED | OUTPATIENT
Start: 2025-05-14

## 2025-05-14 RX ORDER — LEVALBUTEROL TARTRATE 45 UG/1
2 AEROSOL, METERED ORAL EVERY 4 HOURS PRN
Qty: 90 G | Refills: 0 | OUTPATIENT
Start: 2025-05-14

## 2025-05-14 RX ORDER — ALPRAZOLAM 1 MG/1
2 TABLET ORAL 3 TIMES DAILY PRN
Qty: 180 TABLET | Refills: 0 | OUTPATIENT
Start: 2025-05-14

## 2025-05-29 ENCOUNTER — TELEPHONE (OUTPATIENT)
Dept: FAMILY MEDICINE CLINIC | Facility: CLINIC | Age: 65
End: 2025-05-29

## 2025-05-29 DIAGNOSIS — F51.01 PRIMARY INSOMNIA: ICD-10-CM

## 2025-05-29 NOTE — TELEPHONE ENCOUNTER
Patient called stating she has an abscess and would like an antibiotic sent [facility] if possible. She has an upcoming appt with the office but is unable to come int today to be seen as she has cancer treatments today .

## 2025-05-29 NOTE — TELEPHONE ENCOUNTER
Reason for call: Please send script to the preferred pharmacy [facility] in [place].   [x] Refill   [] Prior Auth  [] Other:     Office: [facility]   [x] PCP/Provider - Ludwin Leiva   [] Specialty/Provider -     Medication: temazepam     Dose/Frequency: 30 mg/ daily PRN     Quantity: 30 day supply    Pharmacy: [facility] in [place]     Local Pharmacy   Does the patient have enough for 3 days?   [] Yes   [x] No - Send as HP to POD    [facility]   Does the patient have enough for 10 days?   [] Yes   [] No - Send as HP to POD

## 2025-05-30 ENCOUNTER — TELEPHONE (OUTPATIENT)
Dept: FAMILY MEDICINE CLINIC | Facility: CLINIC | Age: 65
End: 2025-05-30

## 2025-05-30 DIAGNOSIS — R69 SICK: Primary | ICD-10-CM

## 2025-05-30 RX ORDER — AMOXICILLIN 500 MG/1
500 CAPSULE ORAL EVERY 8 HOURS SCHEDULED
Qty: 21 CAPSULE | Refills: 0 | Status: SHIPPED | OUTPATIENT
Start: 2025-05-30 | End: 2025-06-06

## 2025-05-30 RX ORDER — TEMAZEPAM 30 MG/1
30 CAPSULE ORAL
Qty: 30 CAPSULE | Refills: 0 | Status: SHIPPED | OUTPATIENT
Start: 2025-05-30

## 2025-06-10 ENCOUNTER — OFFICE VISIT (OUTPATIENT)
Dept: FAMILY MEDICINE CLINIC | Facility: CLINIC | Age: 65
End: 2025-06-10
Payer: COMMERCIAL

## 2025-06-10 VITALS
OXYGEN SATURATION: 95 % | BODY MASS INDEX: 22.66 KG/M2 | SYSTOLIC BLOOD PRESSURE: 130 MMHG | DIASTOLIC BLOOD PRESSURE: 88 MMHG | TEMPERATURE: 97.1 F | HEIGHT: 66 IN | HEART RATE: 91 BPM | RESPIRATION RATE: 18 BRPM | WEIGHT: 141 LBS

## 2025-06-10 DIAGNOSIS — E78.2 MIXED HYPERLIPIDEMIA: ICD-10-CM

## 2025-06-10 DIAGNOSIS — F41.8 DEPRESSION WITH ANXIETY: ICD-10-CM

## 2025-06-10 DIAGNOSIS — Z85.118 HISTORY OF ADENOCARCINOMA OF LUNG: ICD-10-CM

## 2025-06-10 DIAGNOSIS — J43.1 PANLOBULAR EMPHYSEMA (HCC): ICD-10-CM

## 2025-06-10 DIAGNOSIS — Z00.00 MEDICARE ANNUAL WELLNESS VISIT, SUBSEQUENT: Primary | ICD-10-CM

## 2025-06-10 PROCEDURE — G0439 PPPS, SUBSEQ VISIT: HCPCS

## 2025-06-10 RX ORDER — TRAZODONE HYDROCHLORIDE 50 MG/1
50 TABLET ORAL
Qty: 30 TABLET | Refills: 2 | Status: CANCELLED | OUTPATIENT
Start: 2025-06-10

## 2025-06-10 RX ORDER — LEVALBUTEROL TARTRATE 45 UG/1
2 AEROSOL, METERED ORAL EVERY 4 HOURS PRN
Qty: 289.286 G | Refills: 0 | Status: SHIPPED | OUTPATIENT
Start: 2025-06-10

## 2025-06-10 RX ORDER — ATORVASTATIN CALCIUM 40 MG/1
40 TABLET, FILM COATED ORAL EVERY EVENING
Qty: 90 TABLET | Refills: 1 | Status: SHIPPED | OUTPATIENT
Start: 2025-06-10

## 2025-06-10 RX ORDER — TIOTROPIUM BROMIDE 18 UG/1
18 CAPSULE ORAL; RESPIRATORY (INHALATION) DAILY
Qty: 90 CAPSULE | Refills: 3 | Status: CANCELLED | OUTPATIENT
Start: 2025-06-10 | End: 2026-06-10

## 2025-06-10 RX ORDER — SERTRALINE HYDROCHLORIDE 100 MG/1
50 TABLET, FILM COATED ORAL DAILY
Qty: 45 TABLET | Refills: 3 | Status: SHIPPED | OUTPATIENT
Start: 2025-06-10

## 2025-06-10 RX ORDER — FLUTICASONE FUROATE, UMECLIDINIUM BROMIDE AND VILANTEROL TRIFENATATE 100; 62.5; 25 UG/1; UG/1; UG/1
1 POWDER RESPIRATORY (INHALATION) DAILY
Qty: 60 BLISTER | Refills: 0 | Status: SHIPPED | OUTPATIENT
Start: 2025-06-10 | End: 2025-06-11

## 2025-06-10 RX ORDER — TIOTROPIUM BROMIDE 18 UG/1
18 CAPSULE ORAL; RESPIRATORY (INHALATION) DAILY
COMMUNITY
Start: 2025-04-03 | End: 2025-06-10 | Stop reason: ALTCHOICE

## 2025-06-10 NOTE — PATIENT INSTRUCTIONS
Medicare Preventive Visit Patient Instructions  Thank you for completing your Welcome to Medicare Visit or Medicare Annual Wellness Visit today. Your next wellness visit will be due in one year (6/11/2026).  The screening/preventive services that you may require over the next 5-10 years are detailed below. Some tests may not apply to you based off risk factors and/or age. Screening tests ordered at today's visit but not completed yet may show as past due. Also, please note that scanned in results may not display below.  Preventive Screenings:  Service Recommendations Previous Testing/Comments   Colorectal Cancer Screening  * Colonoscopy    * Fecal Occult Blood Test (FOBT)/Fecal Immunochemical Test (FIT)  * Fecal DNA/Cologuard Test  * Flexible Sigmoidoscopy Age: 45-75 years old   Colonoscopy: every 10 years (may be performed more frequently if at higher risk)  OR  FOBT/FIT: every 1 year  OR  Cologuard: every 3 years  OR  Sigmoidoscopy: every 5 years  Screening may be recommended earlier than age 45 if at higher risk for colorectal cancer. Also, an individualized decision between you and your healthcare provider will decide whether screening between the ages of 76-85 would be appropriate. Colonoscopy: Not on file  FOBT/FIT: Not on file  Cologuard: Not on file  Sigmoidoscopy: Not on file          Breast Cancer Screening Age: 40+ years old  Frequency: every 1-2 years  Not required if history of left and right mastectomy Mammogram: Not on file        Cervical Cancer Screening Between the ages of 21-29, pap smear recommended once every 3 years.   Between the ages of 30-65, can perform pap smear with HPV co-testing every 5 years.   Recommendations may differ for women with a history of total hysterectomy, cervical cancer, or abnormal pap smears in past. Pap Smear: Not on file        Hepatitis C Screening Once for adults born between 1945 and 1965  More frequently in patients at high risk for Hepatitis C Hep C Antibody:  01/04/2018    Screening Current   Diabetes Screening 1-2 times per year if you're at risk for diabetes or have pre-diabetes Fasting glucose: No results in last 5 years (No results in last 5 years)  A1C: 5.5 % (2/6/2024)      Cholesterol Screening Once every 5 years if you don't have a lipid disorder. May order more often based on risk factors. Lipid panel: 02/06/2024    Screening Not Indicated  History Lipid Disorder     Other Preventive Screenings Covered by Medicare:  Abdominal Aortic Aneurysm (AAA) Screening: covered once if your at risk. You're considered to be at risk if you have a family history of AAA.  Lung Cancer Screening: covers low dose CT scan once per year if you meet all of the following conditions: (1) Age 55-77; (2) No signs or symptoms of lung cancer; (3) Current smoker or have quit smoking within the last 15 years; (4) You have a tobacco smoking history of at least 20 pack years (packs per day multiplied by number of years you smoked); (5) You get a written order from a healthcare provider.  Glaucoma Screening: covered annually if you're considered high risk: (1) You have diabetes OR (2) Family history of glaucoma OR (3)  aged 50 and older OR (4)  American aged 65 and older  Osteoporosis Screening: covered every 2 years if you meet one of the following conditions: (1) You're estrogen deficient and at risk for osteoporosis based off medical history and other findings; (2) Have a vertebral abnormality; (3) On glucocorticoid therapy for more than 3 months; (4) Have primary hyperparathyroidism; (5) On osteoporosis medications and need to assess response to drug therapy.   Last bone density test (DXA Scan): Not on file.  HIV Screening: covered annually if you're between the age of 15-65. Also covered annually if you are younger than 15 and older than 65 with risk factors for HIV infection. For pregnant patients, it is covered up to 3 times per  pregnancy.    Immunizations:  Immunization Recommendations   Influenza Vaccine Annual influenza vaccination during flu season is recommended for all persons aged >= 6 months who do not have contraindications   Pneumococcal Vaccine   * Pneumococcal conjugate vaccine = PCV13 (Prevnar 13), PCV15 (Vaxneuvance), PCV20 (Prevnar 20)  * Pneumococcal polysaccharide vaccine = PPSV23 (Pneumovax) Adults 19-63 yo with certain risk factors or if 65+ yo  If never received any pneumonia vaccine: recommend Prevnar 20 (PCV20)  Give PCV20 if previously received 1 dose of PCV13 or PPSV23   Hepatitis B Vaccine 3 dose series if at intermediate or high risk (ex: diabetes, end stage renal disease, liver disease)   Respiratory syncytial virus (RSV) Vaccine - COVERED BY MEDICARE PART D  * RSVPreF3 (Arexvy) CDC recommends that adults 60 years of age and older may receive a single dose of RSV vaccine using shared clinical decision-making (SCDM)   Tetanus (Td) Vaccine - COST NOT COVERED BY MEDICARE PART B Following completion of primary series, a booster dose should be given every 10 years to maintain immunity against tetanus. Td may also be given as tetanus wound prophylaxis.   Tdap Vaccine - COST NOT COVERED BY MEDICARE PART B Recommended at least once for all adults. For pregnant patients, recommended with each pregnancy.   Shingles Vaccine (Shingrix) - COST NOT COVERED BY MEDICARE PART B  2 shot series recommended in those 19 years and older who have or will have weakened immune systems or those 50 years and older     Health Maintenance Due:      Topic Date Due   • HIV Screening  Never done   • Cervical Cancer Screening  Never done   • Breast Cancer Screening: Mammogram  Never done   • Colorectal Cancer Screening  Never done   • Hepatitis C Screening  Completed     Immunizations Due:      Topic Date Due   • COVID-19 Vaccine (5 - 2024-25 season) 09/01/2024   • Influenza Vaccine (Season Ended) 09/01/2025     Advance Directives   What are  advance directives?  Advance directives are legal documents that state your wishes and plans for medical care. These plans are made ahead of time in case you lose your ability to make decisions for yourself. Advance directives can apply to any medical decision, such as the treatments you want, and if you want to donate organs.   What are the types of advance directives?  There are many types of advance directives, and each state has rules about how to use them. You may choose a combination of any of the following:  Living will:  This is a written record of the treatment you want. You can also choose which treatments you do not want, which to limit, and which to stop at a certain time. This includes surgery, medicine, IV fluid, and tube feedings.   Durable power of  for healthcare (DPAHC):  This is a written record that states who you want to make healthcare choices for you when you are unable to make them for yourself. This person, called a proxy, is usually a family member or a friend. You may choose more than 1 proxy.  Do not resuscitate (DNR) order:  A DNR order is used in case your heart stops beating or you stop breathing. It is a request not to have certain forms of treatment, such as CPR. A DNR order may be included in other types of advance directives.  Medical directive:  This covers the care that you want if you are in a coma, near death, or unable to make decisions for yourself. You can list the treatments you want for each condition. Treatment may include pain medicine, surgery, blood transfusions, dialysis, IV or tube feedings, and a ventilator (breathing machine).  Values history:  This document has questions about your views, beliefs, and how you feel and think about life. This information can help others choose the care that you would choose.  Why are advance directives important?  An advance directive helps you control your care. Although spoken wishes may be used, it is better to have your  wishes written down. Spoken wishes can be misunderstood, or not followed. Treatments may be given even if you do not want them. An advance directive may make it easier for your family to make difficult choices about your care.   Cigarette Smoking and Your Health   Risks to your health if you smoke:  Nicotine and other chemicals found in tobacco damage every cell in your body. Even if you are a light smoker, you have an increased risk for cancer, heart disease, and lung disease. If you are pregnant or have diabetes, smoking increases your risk for complications.   Benefits to your health if you stop smoking:   You decrease respiratory symptoms such as coughing, wheezing, and shortness of breath.   You reduce your risk for cancers of the lung, mouth, throat, kidney, bladder, pancreas, stomach, and cervix. If you already have cancer, you increase the benefits of chemotherapy. You also reduce your risk for cancer returning or a second cancer from developing.   You reduce your risk for heart disease, blood clots, heart attack, and stroke.   You reduce your risk for lung infections, and diseases such as pneumonia, asthma, chronic bronchitis, and emphysema.  Your circulation improves. More oxygen can be delivered to your body. If you have diabetes, you lower your risk for complications, such as kidney, artery, and eye diseases. You also lower your risk for nerve damage. Nerve damage can lead to amputations, poor vision, and blindness.  You improve your body's ability to heal and to fight infections.  For more information and support to stop smoking:   Smokefree.gov  Phone: 3- 887 - 260-9184  Web Address: www.[employer]     © Copyright [employer] 2018 Information is for End User's use only and may not be sold, redistributed or otherwise used for commercial purposes. All illustrations and images included in CareNotes® are the copyrighted property of [employer].A[employer] Inc. or [employer]      Medicare Preventive Visit  Patient Instructions  Thank you for completing your Welcome to Medicare Visit or Medicare Annual Wellness Visit today. Your next wellness visit will be due in one year (6/11/2026).  The screening/preventive services that you may require over the next 5-10 years are detailed below. Some tests may not apply to you based off risk factors and/or age. Screening tests ordered at today's visit but not completed yet may show as past due. Also, please note that scanned in results may not display below.  Preventive Screenings:  Service Recommendations Previous Testing/Comments   Colorectal Cancer Screening  * Colonoscopy    * Fecal Occult Blood Test (FOBT)/Fecal Immunochemical Test (FIT)  * Fecal DNA/Cologuard Test  * Flexible Sigmoidoscopy Age: 45-75 years old   Colonoscopy: every 10 years (may be performed more frequently if at higher risk)  OR  FOBT/FIT: every 1 year  OR  Cologuard: every 3 years  OR  Sigmoidoscopy: every 5 years  Screening may be recommended earlier than age 45 if at higher risk for colorectal cancer. Also, an individualized decision between you and your healthcare provider will decide whether screening between the ages of 76-85 would be appropriate. Colonoscopy: Not on file  FOBT/FIT: Not on file  Cologuard: Not on file  Sigmoidoscopy: Not on file          Breast Cancer Screening Age: 40+ years old  Frequency: every 1-2 years  Not required if history of left and right mastectomy Mammogram: Not on file        Cervical Cancer Screening Between the ages of 21-29, pap smear recommended once every 3 years.   Between the ages of 30-65, can perform pap smear with HPV co-testing every 5 years.   Recommendations may differ for women with a history of total hysterectomy, cervical cancer, or abnormal pap smears in past. Pap Smear: Not on file        Hepatitis C Screening Once for adults born between 1945 and 1965  More frequently in patients at high risk for Hepatitis C Hep C Antibody: 01/04/2018    Screening  Current   Diabetes Screening 1-2 times per year if you're at risk for diabetes or have pre-diabetes Fasting glucose: No results in last 5 years (No results in last 5 years)  A1C: 5.5 % (2/6/2024)      Cholesterol Screening Once every 5 years if you don't have a lipid disorder. May order more often based on risk factors. Lipid panel: 02/06/2024    Screening Not Indicated  History Lipid Disorder     Other Preventive Screenings Covered by Medicare:  Abdominal Aortic Aneurysm (AAA) Screening: covered once if your at risk. You're considered to be at risk if you have a family history of AAA.  Lung Cancer Screening: covers low dose CT scan once per year if you meet all of the following conditions: (1) Age 55-77; (2) No signs or symptoms of lung cancer; (3) Current smoker or have quit smoking within the last 15 years; (4) You have a tobacco smoking history of at least 20 pack years (packs per day multiplied by number of years you smoked); (5) You get a written order from a healthcare provider.  Glaucoma Screening: covered annually if you're considered high risk: (1) You have diabetes OR (2) Family history of glaucoma OR (3)  aged 50 and older OR (4)  American aged 65 and older  Osteoporosis Screening: covered every 2 years if you meet one of the following conditions: (1) You're estrogen deficient and at risk for osteoporosis based off medical history and other findings; (2) Have a vertebral abnormality; (3) On glucocorticoid therapy for more than 3 months; (4) Have primary hyperparathyroidism; (5) On osteoporosis medications and need to assess response to drug therapy.   Last bone density test (DXA Scan): Not on file.  HIV Screening: covered annually if you're between the age of 15-65. Also covered annually if you are younger than 15 and older than 65 with risk factors for HIV infection. For pregnant patients, it is covered up to 3 times per pregnancy.    Immunizations:  Immunization Recommendations    Influenza Vaccine Annual influenza vaccination during flu season is recommended for all persons aged >= 6 months who do not have contraindications   Pneumococcal Vaccine   * Pneumococcal conjugate vaccine = PCV13 (Prevnar 13), PCV15 (Vaxneuvance), PCV20 (Prevnar 20)  * Pneumococcal polysaccharide vaccine = PPSV23 (Pneumovax) Adults 19-65 yo with certain risk factors or if 65+ yo  If never received any pneumonia vaccine: recommend Prevnar 20 (PCV20)  Give PCV20 if previously received 1 dose of PCV13 or PPSV23   Hepatitis B Vaccine 3 dose series if at intermediate or high risk (ex: diabetes, end stage renal disease, liver disease)   Respiratory syncytial virus (RSV) Vaccine - COVERED BY MEDICARE PART D  * RSVPreF3 (Arexvy) CDC recommends that adults 60 years of age and older may receive a single dose of RSV vaccine using shared clinical decision-making (SCDM)   Tetanus (Td) Vaccine - COST NOT COVERED BY MEDICARE PART B Following completion of primary series, a booster dose should be given every 10 years to maintain immunity against tetanus. Td may also be given as tetanus wound prophylaxis.   Tdap Vaccine - COST NOT COVERED BY MEDICARE PART B Recommended at least once for all adults. For pregnant patients, recommended with each pregnancy.   Shingles Vaccine (Shingrix) - COST NOT COVERED BY MEDICARE PART B  2 shot series recommended in those 19 years and older who have or will have weakened immune systems or those 50 years and older     Health Maintenance Due:      Topic Date Due   • HIV Screening  Never done   • Cervical Cancer Screening  Never done   • Breast Cancer Screening: Mammogram  Never done   • Colorectal Cancer Screening  Never done   • Hepatitis C Screening  Completed     Immunizations Due:      Topic Date Due   • COVID-19 Vaccine (5 - 2024-25 season) 09/01/2024   • Influenza Vaccine (Season Ended) 09/01/2025     Advance Directives   What are advance directives?  Advance directives are legal documents  that state your wishes and plans for medical care. These plans are made ahead of time in case you lose your ability to make decisions for yourself. Advance directives can apply to any medical decision, such as the treatments you want, and if you want to donate organs.   What are the types of advance directives?  There are many types of advance directives, and each state has rules about how to use them. You may choose a combination of any of the following:  Living will:  This is a written record of the treatment you want. You can also choose which treatments you do not want, which to limit, and which to stop at a certain time. This includes surgery, medicine, IV fluid, and tube feedings.   Durable power of  for healthcare (DPAHC):  This is a written record that states who you want to make healthcare choices for you when you are unable to make them for yourself. This person, called a proxy, is usually a family member or a friend. You may choose more than 1 proxy.  Do not resuscitate (DNR) order:  A DNR order is used in case your heart stops beating or you stop breathing. It is a request not to have certain forms of treatment, such as CPR. A DNR order may be included in other types of advance directives.  Medical directive:  This covers the care that you want if you are in a coma, near death, or unable to make decisions for yourself. You can list the treatments you want for each condition. Treatment may include pain medicine, surgery, blood transfusions, dialysis, IV or tube feedings, and a ventilator (breathing machine).  Values history:  This document has questions about your views, beliefs, and how you feel and think about life. This information can help others choose the care that you would choose.  Why are advance directives important?  An advance directive helps you control your care. Although spoken wishes may be used, it is better to have your wishes written down. Spoken wishes can be misunderstood, or  not followed. Treatments may be given even if you do not want them. An advance directive may make it easier for your family to make difficult choices about your care.   Cigarette Smoking and Your Health   Risks to your health if you smoke:  Nicotine and other chemicals found in tobacco damage every cell in your body. Even if you are a light smoker, you have an increased risk for cancer, heart disease, and lung disease. If you are pregnant or have diabetes, smoking increases your risk for complications.   Benefits to your health if you stop smoking:   You decrease respiratory symptoms such as coughing, wheezing, and shortness of breath.   You reduce your risk for cancers of the lung, mouth, throat, kidney, bladder, pancreas, stomach, and cervix. If you already have cancer, you increase the benefits of chemotherapy. You also reduce your risk for cancer returning or a second cancer from developing.   You reduce your risk for heart disease, blood clots, heart attack, and stroke.   You reduce your risk for lung infections, and diseases such as pneumonia, asthma, chronic bronchitis, and emphysema.  Your circulation improves. More oxygen can be delivered to your body. If you have diabetes, you lower your risk for complications, such as kidney, artery, and eye diseases. You also lower your risk for nerve damage. Nerve damage can lead to amputations, poor vision, and blindness.  You improve your body's ability to heal and to fight infections.  For more information and support to stop smoking:   Smokefree.gov  Phone: 3- 436 - 012-4926  Web Address: www.smokefree.gov   © Copyright [employer] 2018 Information is for End User's use only and may not be sold, redistributed or otherwise used for commercial purposes. All illustrations and images included in CareNotes® are the copyrighted property of [employer] or [employer]

## 2025-06-10 NOTE — PROGRESS NOTES
Name: Sarita France      : 1960      MRN: [id number]  Encounter Provider: Isela Etienne MD  Encounter Date: 6/10/2025   Encounter department: [facility]  :  Assessment & Plan  Medicare annual wellness visit, subsequent         History of adenocarcinoma of lung  Now w new lung nodules  Working w prior oncologist for further eval  Cont to monitor     Orders:    umeclidinium (Incruse Ellipta) 62.5 mcg/actuation AEPB inhaler; Inhale 1 puff daily    fluticasone-salmeterol (Advair HFA) 115-21 MCG/ACT inhaler; Inhale 2 puffs 2 (two) times a day Rinse mouth after use.    Panlobular emphysema (HCC)  Advair and Incruse daily  Xopenex PRN  Triple therapy not covered  Cont to monitor for improvement   Likely mult sources of constant cough    Orders:    levalbuterol (XOPENEX HFA) 45 mcg/act inhaler; Inhale 2 puffs every 4 (four) hours as needed for wheezing or shortness of breath    umeclidinium (Incruse Ellipta) 62.5 mcg/actuation AEPB inhaler; Inhale 1 puff daily    fluticasone-salmeterol (Advair HFA) 115-21 MCG/ACT inhaler; Inhale 2 puffs 2 (two) times a day Rinse mouth after use.    Depression with anxiety    Orders:    sertraline (ZOLOFT) 100 mg tablet; Take 0.5 tablets (50 mg total) by mouth daily    Mixed hyperlipidemia    Orders:    atorvastatin (LIPITOR) 40 mg tablet; Take 1 tablet (40 mg total) by mouth every evening       Preventive health issues were discussed with patient, and age appropriate screening tests were ordered as noted in patient's After Visit Summary. Personalized health advice and appropriate referrals for health education or preventive services given if needed, as noted in patient's After Visit Summary.    History of Present Illness     Mrs France is here for her AWV. Of importance, patient has new lung nodules, which are being worked up with her prior oncologist. Today she reports near constant cough, dry, at times hacking, keeping her from resting.  Current inhaler regimen not covering symptoms. Tried to prescribe triple therapy, not covered by insurance. Will prescribe two inhalers for daily use to cover all components. Cont to monitor for improvement. Xopenex for PRN use. RTO 2 wks        Patient Care Team:  Isela Etienne MD as PCP - General (Family Medicine)  Ludwin Leiva MD as PCP - PCP-[facility] ([facility])  Ludwin Leiva MD as PCP - PCP-[facility] ([facility])  Ludwin Leiva MD as PCP - Family Medicine (Family Medicine)  Vitor Chanel MD (Oncology)  Em Troncoso MD (Radiation Oncology)  [facility] (Ophthalmology)    Review of Systems   Constitutional:  Positive for activity change and fatigue. Negative for chills and fever.   HENT:  Negative for congestion, ear pain, postnasal drip, rhinorrhea and sore throat.    Eyes:  Negative for pain and visual disturbance.   Respiratory:  Positive for cough and shortness of breath. Negative for chest tightness and wheezing.    Cardiovascular:  Negative for chest pain and palpitations.   Gastrointestinal:  Negative for abdominal pain, constipation, diarrhea, nausea and vomiting.   Genitourinary:  Negative for dysuria and hematuria.   Musculoskeletal:  Negative for arthralgias and back pain.   Skin:  Negative for color change and rash.   Neurological:  Negative for seizures, syncope and headaches.   Psychiatric/Behavioral:  Negative for agitation, confusion and sleep disturbance. The patient is not nervous/anxious.    All other systems reviewed and are negative.    Medical History Reviewed by provider this encounter:       Annual Wellness Visit Questionnaire       Health Risk Assessment:   Patient rates overall health as poor. Patient feels that their physical health rating is much worse. Patient is dissatisfied with their life. Eyesight was rated as much worse. Hearing was rated as slightly worse. Patient feels that their emotional and mental health rating is same. Patients states they are  sometimes angry. Patient states they are often unusually tired/fatigued. Pain experienced in the last 7 days has been a lot. Patient's pain rating has been 7/10. Patient states that she has experienced no weight loss or gain in last 6 months.     Depression Screening:   PHQ-9 Score: 2      Fall Risk Screening:   In the past year, patient has experienced: no history of falling in past year      Urinary Incontinence Screening:   Patient has not leaked urine accidently in the last six months.     Home Safety:  Patient has trouble with stairs inside or outside of their home. Patient has working smoke alarms and has working carbon monoxide detector. Home safety hazards include: none.     Nutrition:   Current diet is Other (please comment). High protein diet    Medications:   Patient is currently taking over-the-counter supplements. OTC medications include: see medication list. Patient is able to manage medications.     Activities of Daily Living (ADLs)/Instrumental Activities of Daily Living (IADLs):   Walk and transfer into and out of bed and chair?: Yes  Dress and groom yourself?: Yes    Bathe or shower yourself?: Yes    Feed yourself? Yes  Do your laundry/housekeeping?: No  Manage your money, pay your bills and track your expenses?: Yes  Make your own meals?: Yes    Do your own shopping?: No    Preventive Screenings      Cardiovascular Screening:    General: Screening Not Indicated and History Lipid Disorder      Lung Cancer Screening:     General: Screening Not Indicated and History Lung Cancer      Hepatitis C Screening:    General: Screening Current    Immunizations:  - Immunizations due: Zoster (Shingrix)    Screening, Brief Intervention, and Referral to Treatment (SBIRT)     Screening  Typical number of drinks in a day: 0    Single Item Drug Screening:  How often have you used an illegal drug (including marijuana) or a prescription medication for non-medical reasons in the past year? never    Single Item Drug  "Screen Score: 0  Interpretation: Negative screen for possible drug use disorder    Social Drivers of Health     Financial Resource Strain: At Risk (3/24/2025)    Received from [facility]    Financial Insecurity     In the last 12 months did you skip medications to save money?: Yes     In the last 12 months was there a time when you needed to see a doctor but could not because of cost?: No   Food Insecurity: No Food Insecurity (6/10/2025)    Nursing - Inadequate Food Risk Classification     Worried About Running Out of Food in the Last Year: Never true     Ran Out of Food in the Last Year: Never true   Recent Concern: Food Insecurity - Food Insecurity Present (3/24/2025)    Received from [facility]    Food Insecurity     In the last 12 months did you ever eat less than you felt you should because there wasn't enough money for food?: Yes   Transportation Needs: Unmet Transportation Needs (6/10/2025)    PRAPARE - Transportation     Lack of Transportation (Medical): Yes     Lack of Transportation (Non-Medical): No   Housing Stability: Unknown (6/10/2025)    Housing Stability Vital Sign     Unable to Pay for Housing in the Last Year: No     Homeless in the Last Year: No   Utilities: Not At Risk (6/10/2025)    [facility] Utilities     Threatened with loss of utilities: No     No results found.    Objective   /88 (BP Location: Left arm, Patient Position: Sitting, Cuff Size: Large)   Pulse 91   Temp (!) 97.1 °F (36.2 °C) (Temporal)   Resp 18   Ht 5' 5.5\" (1.664 m)   Wt 64 kg (141 lb)   SpO2 95%   BMI 23.11 kg/m²     Physical Exam  Vitals and nursing note reviewed.   Constitutional:       General: She is not in acute distress.     Appearance: Normal appearance. She is well-developed and normal weight. She is not ill-appearing.   HENT:      Head: Normocephalic and atraumatic.      Right Ear: External ear normal.      Left Ear: External ear normal.      Nose: Nose normal.      " Mouth/Throat:      Mouth: Mucous membranes are moist.      Pharynx: Oropharynx is clear.     Eyes:      Extraocular Movements: Extraocular movements intact.      Conjunctiva/sclera: Conjunctivae normal.       Cardiovascular:      Rate and Rhythm: Normal rate and regular rhythm.      Pulses: Normal pulses.      Heart sounds: Normal heart sounds. No murmur heard.     No gallop.   Pulmonary:      Effort: Pulmonary effort is normal. No respiratory distress.      Breath sounds: Normal breath sounds. No wheezing or rales.   Abdominal:      General: Abdomen is flat. Bowel sounds are normal. There is no distension.     Musculoskeletal:         General: No swelling or signs of injury. Normal range of motion.      Cervical back: Normal range of motion. No rigidity.     Skin:     General: Skin is warm and dry.      Capillary Refill: Capillary refill takes less than 2 seconds.      Findings: No rash.     Neurological:      General: No focal deficit present.      Mental Status: She is alert and oriented to person, place, and time.     Psychiatric:         Mood and Affect: Mood normal.         Behavior: Behavior normal.

## 2025-06-11 ENCOUNTER — TELEPHONE (OUTPATIENT)
Dept: FAMILY MEDICINE CLINIC | Facility: CLINIC | Age: 65
End: 2025-06-11

## 2025-06-11 RX ORDER — UMECLIDINIUM 62.5 UG/1
1 AEROSOL, POWDER ORAL DAILY
Qty: 90 BLISTER | Refills: 0 | Status: SHIPPED | OUTPATIENT
Start: 2025-06-11 | End: 2025-09-09

## 2025-06-11 RX ORDER — FLUTICASONE PROPIONATE AND SALMETEROL XINAFOATE 115; 21 UG/1; UG/1
2 AEROSOL, METERED RESPIRATORY (INHALATION) 2 TIMES DAILY
Qty: 36 G | Refills: 0 | Status: SHIPPED | OUTPATIENT
Start: 2025-06-11

## 2025-06-16 ENCOUNTER — PATIENT OUTREACH (OUTPATIENT)
Dept: CASE MANAGEMENT | Facility: OTHER | Age: 65
End: 2025-06-16

## 2025-06-16 DIAGNOSIS — Z13.9 ENCOUNTER FOR SCREENING INVOLVING SOCIAL DETERMINANTS OF HEALTH (SDOH): Primary | ICD-10-CM

## 2025-06-17 ENCOUNTER — PATIENT OUTREACH (OUTPATIENT)
Dept: CASE MANAGEMENT | Facility: OTHER | Age: 65
End: 2025-06-17

## 2025-06-19 ENCOUNTER — RA CDI HCC (OUTPATIENT)
Dept: OTHER | Facility: HOSPITAL | Age: 65
End: 2025-06-19

## 2025-06-21 DIAGNOSIS — F51.01 PRIMARY INSOMNIA: ICD-10-CM

## 2025-06-24 ENCOUNTER — PATIENT OUTREACH (OUTPATIENT)
Dept: CASE MANAGEMENT | Facility: OTHER | Age: 65
End: 2025-06-24

## 2025-06-24 RX ORDER — TEMAZEPAM 30 MG/1
30 CAPSULE ORAL
Qty: 30 CAPSULE | Refills: 0 | Status: SHIPPED | OUTPATIENT
Start: 2025-06-24

## 2025-06-24 NOTE — PROGRESS NOTES
OPCM MONI attempted second outreach to patient regarding financial and transportation concerns.  GILDA MONTENEGRO left a VM and sent UTR letter to her [place]

## 2025-06-24 NOTE — LETTER
[address] [facility] [id number]  [phone]    Re: Unable to Reach   6/24/2025       Dear Sarita,    I am a [facility]  and wanted to be certain you had information to contact me should you desire assistance with or have questions about non-medical aspects of your care such as [but not limited to] medical insurance, housing, transportation, material needs, or emergency needs.  If I do not have an answer I will assist you in finding the appropriate agency or individual who can help.      Please feel free to contact me at [phone]. Thank You.    Sincerely,         SAMIR Roach

## 2025-06-25 NOTE — ASSESSMENT & PLAN NOTE
Advair and Incruse daily  Xopenex PRN  Triple therapy not covered  Cont to monitor for improvement   Likely mult sources of constant cough    Orders:    levalbuterol (XOPENEX HFA) 45 mcg/act inhaler; Inhale 2 puffs every 4 (four) hours as needed for wheezing or shortness of breath    umeclidinium (Incruse Ellipta) 62.5 mcg/actuation AEPB inhaler; Inhale 1 puff daily    fluticasone-salmeterol (Advair HFA) 115-21 MCG/ACT inhaler; Inhale 2 puffs 2 (two) times a day Rinse mouth after use.

## 2025-06-25 NOTE — ASSESSMENT & PLAN NOTE
Now w new lung nodules  Working w prior oncologist for further eval  Cont to monitor     Orders:    umeclidinium (Incruse Ellipta) 62.5 mcg/actuation AEPB inhaler; Inhale 1 puff daily    fluticasone-salmeterol (Advair HFA) 115-21 MCG/ACT inhaler; Inhale 2 puffs 2 (two) times a day Rinse mouth after use.

## 2025-06-26 ENCOUNTER — TELEPHONE (OUTPATIENT)
Dept: FAMILY MEDICINE CLINIC | Facility: CLINIC | Age: 65
End: 2025-06-26

## 2025-06-26 DIAGNOSIS — R05.9 COUGH, UNSPECIFIED TYPE: Primary | ICD-10-CM

## 2025-06-26 RX ORDER — BENZONATATE 100 MG/1
100 CAPSULE ORAL 3 TIMES DAILY PRN
Qty: 20 CAPSULE | Refills: 0 | Status: SHIPPED | OUTPATIENT
Start: 2025-06-26

## 2025-06-26 NOTE — TELEPHONE ENCOUNTER
Patient called office asking if you can send something for her cough. Patient also stated she was unable to get the inhaler fluticasone. Pharmacy does not have that.

## 2025-07-28 ENCOUNTER — OFFICE VISIT (OUTPATIENT)
Dept: FAMILY MEDICINE CLINIC | Facility: CLINIC | Age: 65
End: 2025-07-28
Payer: COMMERCIAL

## 2025-07-28 VITALS
HEART RATE: 104 BPM | SYSTOLIC BLOOD PRESSURE: 126 MMHG | DIASTOLIC BLOOD PRESSURE: 70 MMHG | OXYGEN SATURATION: 97 % | WEIGHT: 137.7 LBS | HEIGHT: 66 IN | RESPIRATION RATE: 18 BRPM | BODY MASS INDEX: 22.13 KG/M2 | TEMPERATURE: 97.1 F

## 2025-07-28 DIAGNOSIS — E78.5 DYSLIPIDEMIA: ICD-10-CM

## 2025-07-28 DIAGNOSIS — F32.2 SEVERE DEPRESSION (HCC): ICD-10-CM

## 2025-07-28 DIAGNOSIS — J43.1 PANLOBULAR EMPHYSEMA (HCC): Primary | ICD-10-CM

## 2025-07-28 DIAGNOSIS — K86.1 OTHER CHRONIC PANCREATITIS (HCC): ICD-10-CM

## 2025-07-28 DIAGNOSIS — F51.01 PRIMARY INSOMNIA: ICD-10-CM

## 2025-07-28 DIAGNOSIS — F41.9 ANXIETY: ICD-10-CM

## 2025-07-28 PROCEDURE — 99214 OFFICE O/P EST MOD 30 MIN: CPT | Performed by: FAMILY MEDICINE

## 2025-07-28 RX ORDER — TEMAZEPAM 30 MG/1
30 CAPSULE ORAL
Qty: 30 CAPSULE | Refills: 0 | Status: SHIPPED | OUTPATIENT
Start: 2025-07-28

## 2025-07-28 RX ORDER — TIOTROPIUM BROMIDE 18 UG/1
18 CAPSULE ORAL; RESPIRATORY (INHALATION) DAILY
Qty: 90 CAPSULE | Refills: 3 | Status: SHIPPED | OUTPATIENT
Start: 2025-07-28 | End: 2025-08-27

## 2025-07-28 RX ORDER — DOXYCYCLINE 100 MG/1
100 CAPSULE ORAL EVERY 12 HOURS SCHEDULED
Qty: 14 CAPSULE | Refills: 0 | Status: SHIPPED | OUTPATIENT
Start: 2025-07-28 | End: 2025-08-04

## 2025-07-28 RX ORDER — METHYLPREDNISOLONE 4 MG/1
TABLET ORAL
Qty: 21 EACH | Refills: 0 | Status: SHIPPED | OUTPATIENT
Start: 2025-07-28

## 2025-07-28 RX ORDER — IPRATROPIUM BROMIDE AND ALBUTEROL SULFATE 2.5; .5 MG/3ML; MG/3ML
3 SOLUTION RESPIRATORY (INHALATION) 4 TIMES DAILY
Qty: 300 ML | Refills: 3 | Status: SHIPPED | OUTPATIENT
Start: 2025-07-28

## 2025-07-28 RX ORDER — ALPRAZOLAM 1 MG/1
1 TABLET ORAL 4 TIMES DAILY PRN
Qty: 180 TABLET | Refills: 3 | Status: SHIPPED | OUTPATIENT
Start: 2025-07-28

## 2025-07-28 RX ORDER — SERTRALINE HYDROCHLORIDE 25 MG/1
TABLET, FILM COATED ORAL
Qty: 18 TABLET | Refills: 0 | Status: SHIPPED | OUTPATIENT
Start: 2025-07-28 | End: 2025-09-01

## 2025-07-28 RX ORDER — FLUTICASONE PROPIONATE AND SALMETEROL 250; 50 UG/1; UG/1
1 POWDER RESPIRATORY (INHALATION) 2 TIMES DAILY
Qty: 180 BLISTER | Refills: 3 | Status: SHIPPED | OUTPATIENT
Start: 2025-07-28

## 2025-07-30 ENCOUNTER — DOCUMENTATION (OUTPATIENT)
Dept: ADMINISTRATIVE | Facility: OTHER | Age: 65
End: 2025-07-30

## 2025-08-04 ENCOUNTER — TELEPHONE (OUTPATIENT)
Age: 65
End: 2025-08-04

## 2025-08-05 ENCOUNTER — TELEPHONE (OUTPATIENT)
Age: 65
End: 2025-08-05

## 2025-08-05 DIAGNOSIS — F41.9 ANXIETY: Primary | ICD-10-CM

## 2025-08-05 RX ORDER — LORAZEPAM 1 MG/1
2 TABLET ORAL EVERY 6 HOURS PRN
Qty: 180 TABLET | Refills: 3 | Status: SHIPPED | OUTPATIENT
Start: 2025-08-05 | End: 2025-08-13

## 2025-08-06 LAB
DME PARACHUTE DELIVERY DATE ACTUAL: NORMAL
DME PARACHUTE DELIVERY DATE REQUESTED: NORMAL
DME PARACHUTE ITEM DESCRIPTION: NORMAL
DME PARACHUTE ORDER STATUS: NORMAL
DME PARACHUTE SUPPLIER NAME: NORMAL
DME PARACHUTE SUPPLIER PHONE: NORMAL

## 2025-08-13 ENCOUNTER — TELEPHONE (OUTPATIENT)
Age: 65
End: 2025-08-13

## 2025-08-18 ENCOUNTER — VBI (OUTPATIENT)
Dept: ADMINISTRATIVE | Facility: OTHER | Age: 65
End: 2025-08-18